# Patient Record
Sex: MALE | Race: WHITE | Employment: OTHER | URBAN - METROPOLITAN AREA
[De-identification: names, ages, dates, MRNs, and addresses within clinical notes are randomized per-mention and may not be internally consistent; named-entity substitution may affect disease eponyms.]

---

## 2018-04-13 ENCOUNTER — HOSPITAL ENCOUNTER (OUTPATIENT)
Dept: PHYSICAL THERAPY | Age: 73
Discharge: HOME | End: 2018-04-13
Payer: MEDICARE

## 2018-04-13 PROCEDURE — G8985 CARRY GOAL STATUS: HCPCS

## 2018-04-13 PROCEDURE — 97161 PT EVAL LOW COMPLEX 20 MIN: CPT

## 2018-04-13 PROCEDURE — G8984 CARRY CURRENT STATUS: HCPCS

## 2018-04-13 PROCEDURE — 97110 THERAPEUTIC EXERCISES: CPT

## 2018-04-13 PROCEDURE — G8986 CARRY D/C STATUS: HCPCS

## 2018-04-13 NOTE — PROGRESS NOTES
PT DAILY TREATMENT NOTE/SHOULDER EVAL 3-16    Patient Name: Felipe Mansfield. Date:2018  : 1945  [x]  Patient  Verified  Payor: Marqueznat Simeon / Plan: [place] MEDICARE PART A & B / Product Type: Medicare /    In time: 11:09am  Out time:11:42am  Total Treatment Time (min): 33  Total Timed Codes (min): 14  1:1 Treatment Time ( only): 33   Visit #: 1 of 1    Treatment Area: Pain in right shoulder [M25.511]    SUBJECTIVE  Pain Level (0-10 scale): 5  []constant [x]intermittent []improving []worsening []no change since onset    Any medication changes, allergies to medications, adverse drug reactions, diagnosis change, or new procedure performed?: [x] No    [] Yes (see summary sheet for update)  Subjective functional status/changes:    Pt reports onset of R shoulder pain 2 weeks ago when playing with his grandchilddeedee's dog and \"blocked\" the dog with his shoulder when the dog charged at him. He reports he felt a pop in his shoulder with immediate onset of shoulder pain and reports he has been unable to lift his shoulder since. He reports urgent care and PCP f/u to date with x-ray imaging that was reportedly unremarkable and is scheduled for orthopaedic f/u in 2 weeks. He also reports onset of R sided neck pain upon waking this morning. PLOF: unlimited with daily activity void of shoulder pain.   Limitations to PLOF: unable to lift his arm overhead 2/2 shoulder pain  Mechanism of Injury: see above  Current symptoms/Complaints: R shoulder and arm aching pain, sharp with elevation attempts  Previous Treatment/Compliance: PCP and urgent care, unremarkable radiographic imaging per pt report  PMHx/Surgical Hx: unremarkable per pt report  Work Hx: retired,  part time  Living Situation:   Pt Goals: see intake  Barriers: []pain []financial []time []transportation []other  Motivation: appears well motivated  Substance use: []Alcohol []Tobacco []other:   FABQ Score: []low []elevate  Cognition: A & O x 4 Other: OBJECTIVE/EXAMINATION  Domestic Life:   Activity/Recreational Limitations:   Mobility:   Self Care:     19 min [x]Eval                  []Re-Eval     8 min Therapeutic Exercise:  [] See flow sheet : HEP creation and instruction per handout   Rationale: increase ROM to improve the patients ability to maintain shoulder ROM to reduce risk of contracture. Self Care: 6 minutes pt education regarding relevant anatomy, diagnosis, prognosis, plan of care, orthopaedic referral and instructions regarding potential f/u to facilitate pt self management and improved outcomes.           With   [] TE   [] TA   [] neuro   [] other: Patient Education: [x] Review HEP    [] Progressed/Changed HEP based on:   [] positioning   [] body mechanics   [] transfers   [] heat/ice application    [] other:      Other Objective/Functional Measures:    Physical Therapy Evaluation - Shoulder    Posture: [] Poor    [x] Fair    [] Good    Describe:    ROM:  [] Unable to assess at this time                                           AROM                                                              PROM   Left Right  Left Right   Flexion 145 54 pain Flexion  WNL pain at end range   Extension   Extension     Scaption/ 30 pain Scaptin/ABD  WNL pain at end range   ER @ 0 Degrees   ER @ 0 Degrees     ER @ 90 Degrees   ER @ 90 Degrees  WNL   IR @ 90 Degrees   IR @ 90 Degrees  WNL     End Feel / Painful Arc:    Strength:   [] Unable to assess at this time                                                                            L (1-5) R (1-5) Pain   Flexors 4+ 2 [x] Yes   [] No   Abductors 4+ 2 [x] Yes   [] No   External Rotators 5 3 [x] Yes   [] No   Internal Rotators 5 4+ [] Yes   [x] No   Supraspinatus   [] Yes   [] No   Serratus Anterior   [] Yes   [] No   Lower Trapezius   [] Yes   [] No   Elbow Flexion 5 4 [x] Yes   [] No   Elbow Extension 5 5 [] Yes   [x] No       Scapulohumoral Control / Rhythm:  Able to eccentrically lower with good control? Left: [x] Yes   [] No     Right: [] Yes   [x] No    Accessory Motions:    Palpation  [] Min  [x] Mod  [] Severe    Location: lateral biceps with palpable defect  [] Min  [] Mod  [] Severe    Location:  [] Min  [] Mod  [] Severe    Location:    Optional Tests:    Sensation Left Right Reflexes Left Right   Biceps (C5)   Biceps (C5)     Sandoval Radial(C6-7)   Brachioradialis (C6)     Sandoval Ulnar(C8-T1)   Triceps (C7)       Adson's Test  [] Pos   [] Neg Yergason's Test [] Pos   [] Neg  Manda's Test  [] Pos   [] Neg Indio's Sign [] Pos   [] Neg  Neer's Test  [] Pos   [] Neg Clunk Test  [] Pos   [] Neg  Hawkin's Test  [x] Pos   [] Neg AC Joint  [] Pos   [] Neg  Speed's Test  [] Pos   [] Neg unable to perform d/t pain SC Joint  [] Pos   [] Neg  Empty Can  [] Pos   [] Neg unable to perform d/t pain Pectoral Tightness [] Pos   [] Neg  Anterior Apprehension [] Pos   [] Neg   Posterior Apprehension [] Pos   [] Neg  Drop Arm Test: (+) minimal drop after release and unable to hold with any resistance    Other Tests / Comments:     Pain Level (0-10 scale) post treatment: 6    ASSESSMENT/Changes in Function: Per POC. Patient will continue to benefit from skilled PT services to modify and progress therapeutic interventions, address functional mobility deficits, address ROM deficits, address strength deficits, analyze and address soft tissue restrictions and analyze and cue movement patterns to attain remaining goals. [x]  See Plan of Care  []  See progress note/recertification  []  See Discharge Summary         Progress towards goals / Updated goals:  Per POC. PLAN  []  Upgrade activities as tolerated     []  Continue plan of care  []  Update interventions per flow sheet       []  Discharge due to:_  [x]  Other:_ Hold pending ortho f/u d/t clinical indications of RTC tear involvement and possible biceps involvement.      Tsering Calles, PT, DPT, ATC 4/13/2018  11:16 AM

## 2018-04-13 NOTE — PROGRESS NOTES
[facility]  [address]  [place], [address]  [phone] fax    Plan of Care/ Statement of Necessity for Physical Therapy Services    Patient name: Miriam Singh Start of Care: 2018   Referral source: Gordon Ocampo MD : 1945    Medical Diagnosis: Pain in right shoulder [M25.511]   Onset Date: 2 weeks ago   Treatment Diagnosis: R rotator cuff pathology/shoulder pain   Prior Hospitalization: see medical history Provider#: [id number]   Medications: Verified on Patient summary List    Comorbidities: unremarkable per pt report   Prior Level of Function: unlimited with daily activity void of shoulder pain. The Plan of Care and following information is based on the information from the initial evaluation. Assessment/ key information: Pt is a 67year old male presenting with reports of onset of R shoulder pain 2 weeks ago when playing with his grandchilden's dog and \"blocked\" the dog with his shoulder when the dog charged at him. He reports he felt a pop in his shoulder with immediate onset of shoulder pain and reports he has been unable to lift his shoulder since. He reports urgent care and PCP f/u to date with x-ray imaging that was reportedly unremarkable and is scheduled for orthopaedic f/u in 2 weeks. He also reports onset of R sided neck pain upon waking this morning. Signs and symptoms appear consistent with R rotator cuff tear with possible biceps involvement as evidenced by poor active R shoulder elevation with near full passive ROM, weakness with flexion, abd, and ER, minimally (+) drop arm test with severe pain reported, weakness with elbow flexion with questionable palpable defect in the R lateral biceps. At this time, pt has been instructed in HEP to facilitate maintenance of shoulder PROM and has been advised to hold PT pending orthopaedic f/u given indications of possible large RTC tear.  He will call to schedule f/u appts pending orthopaedic f/u if they opt for conservative treatment at that time. Evaluation Complexity History MEDIUM  Complexity : 1-2 comorbidities / personal factors will impact the outcome/ POC ; Examination HIGH Complexity : 4+ Standardized tests and measures addressing body structure, function, activity limitation and / or participation in recreation  ;Presentation LOW Complexity : Stable, uncomplicated  ;Clinical Decision Making MEDIUM Complexity : FOTO score of 26-74  Overall Complexity Rating: LOW   Problem List: pain affecting function, decrease ROM, decrease strength, decrease ADL/ functional abilitiies and decrease activity tolerance   Treatment Plan may include any combination of the following: Therapeutic exercise, Therapeutic activities, Neuromuscular re-education, Physical agent/modality, Manual therapy, Patient education and Self Care training  Patient / Family readiness to learn indicated by: asking questions, trying to perform skills and interest  Persons(s) to be included in education: patient (P)  Barriers to Learning/Limitations: None  Patient Goal (s): Get my golf swing back.   Patient Self Reported Health Status: good  Rehabilitation Potential: fair    Short Term Goals: To be accomplished in NA  Frequency / Duration: Patient to be seen 1 times per week for 2  treatments. Patient/ Caregiver education and instruction: Diagnosis, prognosis, self care, activity modification and exercises   [x]  Plan of care has been reviewed with PTA    G-Codes (GP)  Carry   Current  CK= 40-59%    Goal  CJ= 20-39%    The severity rating is based on clinical judgment and the FOTO score. Certification Period: 4/13/2018 - 5/13/2018  Alphonso Keron, PT, DPT, ATC 4/13/2018 1:48 PM    ________________________________________________________________________    I certify that the above Therapy Services are being furnished while the patient is under my care.  I agree with the treatment plan and certify that this therapy is necessary.     [de-identified] Signature:____________________  Date:____________Time: _________    Please sign and return to [facility]  [address]  [place], [address]  [phone] fax

## 2018-05-24 ENCOUNTER — APPOINTMENT (OUTPATIENT)
Dept: PHYSICAL THERAPY | Age: 73
End: 2018-05-24

## 2018-05-25 ENCOUNTER — HOSPITAL ENCOUNTER (OUTPATIENT)
Dept: PHYSICAL THERAPY | Age: 73
Discharge: HOME | End: 2018-05-25
Payer: MEDICARE

## 2018-05-25 PROCEDURE — 97530 THERAPEUTIC ACTIVITIES: CPT

## 2018-05-25 PROCEDURE — G8985 CARRY GOAL STATUS: HCPCS

## 2018-05-25 PROCEDURE — 97161 PT EVAL LOW COMPLEX 20 MIN: CPT

## 2018-05-25 PROCEDURE — G8984 CARRY CURRENT STATUS: HCPCS

## 2018-05-25 PROCEDURE — 97110 THERAPEUTIC EXERCISES: CPT

## 2018-05-25 NOTE — PROGRESS NOTES
[facility]  [address] [place] [address]  [phone] fax    Plan of Care/ Statement of Necessity for Physical Therapy Services    Patient name: Lai De La Cruz Start of Care: 2018   Referral source: Andrew Landers MD : 1945    Medical Diagnosis: Pain in right shoulder [M25.511]   Onset Date:    Treatment Diagnosis: Right shoulder pain   Prior Hospitalization: see medical history Provider#: [id number]   Medications: Verified on Patient summary List    Comorbidities: HTN, hearing impaired   Prior Level of Function: Ind with all mobility, lifting and pulling heavy objects for job, Right handed     The Plan of Care and following information is based on the information from the initial evaluation. Assessment/ barron information: Mr. Mikey Torres is a 68 y/o M pt with CC of Right shoulder pain. Pt was playing with a dog but then got hit while the dog running straight into his shoulder. Pt reported imaging done including MRI, showing RTC problem and MD is indecisive if he should get a surgery. Pt present with mod decreased flex and abd AROM (90 degrees), WNL with other directions but mdo pain, worst with ER, trigger points with Right Biceps and Triceps, pain mostly locates lat shoulder. Pt demonstrated full PROM strength of right shoulder, able to perform ecc from full abd but max pain at 90 degrees. Besides, pt will also be seen for vertigo. Pt would benefit from skilled PT to address these deficits above to improve the ability to perform ADLs and job duties comfortably and safely.     Evaluation Complexity History MEDIUM  Complexity : 1-2 comorbidities / personal factors will impact the outcome/ POC ; Examination MEDIUM Complexity : 3 Standardized tests and measures addressing body structure, function, activity limitation and / or participation in recreation  ;Presentation LOW Complexity : Stable, uncomplicated  ;Clinical Decision Making MEDIUM Complexity : FOTO score of 26-74  Overall Complexity Rating: LOW   Problem List: pain affecting function, decrease ROM, decrease strength, edema affecting function, impaired gait/ balance, decrease ADL/ functional abilitiies, decrease activity tolerance, decrease flexibility/ joint mobility and decrease transfer abilities   Treatment Plan may include any combination of the following: Therapeutic exercise, Therapeutic activities, Neuromuscular re-education, Physical agent/modality, Gait/balance training, Manual therapy, Patient education, Self Care training, Functional mobility training, Home safety training and Stair training  Patient / Family readiness to learn indicated by: asking questions, trying to perform skills and interest  Persons(s) to be included in education: patient (P)  Barriers to Learning/Limitations: None  Patient Goal (s): back to work  Patient Self Reported Health Status: good  Rehabilitation Potential: good    Short term goals: To be accomplished within 1 week   1. Pt will be independent with HEP to maintain progression. Long term goals: To be accomplished within 6 weeks   1. Pt will improve FOTO score by 4 points to 72/100 to show improvement with functional mobility performance. 2. Pt will have Right shoulder flexion/scap AROM >90 degrees to reach overhead comfortably   3. Pt will have Right shoulder strength at least 4+/5 to be able to perform ADLs comfortably. Frequency / Duration: Patient to be seen 1-2 times per week for 6 weeks. Patient/ CarPatient/ Caregiver education and instruction: Diagnosis, prognosis, self care, activity modification, brace/ splint application and exercises   [x]  Plan of care has been reviewed with PTA    G-Codes (GP)  Carry   Current  CJ= 20-39%    Goal  CJ= 20-39%    The severity rating is based on clinical judgment and the FOTO score.     Certification Period: 5- to 7-    Yolanda Stearns PT 5/25/2018 3:28 PM    ________________________________________________________________________    I certify that the above Therapy Services are being furnished while the patient is under my care. I agree with the treatment plan and certify that this therapy is necessary.     Physician's Signature:____________________  Date:____________Time: _________    Please sign and return to [facility]  [address] [place] [address]  [phone] fax

## 2018-05-25 NOTE — PROGRESS NOTES
PT DAILY TREATMENT NOTE/SHOULDER EVAL 3-16    Patient Name: Tisha Meyers. Date:2018  : 1945  [x]  Patient  Verified  Payor: Jorge A De / Plan: [place] MEDICARE PART A & B / Product Type: Medicare /    In time: 2:38  Out time: 3:23  Total Treatment Time (min): 45  Total Timed Codes (min): 23  1:1 Treatment Time ( only): 45   Visit #: 1 of     Treatment Area: Pain in right shoulder [M25.511]    SUBJECTIVE  Pain Level (0-10 scale): 2-310  []constant []intermittent []improving []worsening []no change since onset    Any medication changes, allergies to medications, adverse drug reactions, diagnosis change, or new procedure performed?: [x] No    [] Yes (see summary sheet for update)  Subjective functional status/changes:     PLOF: Ind with all mobility, lifting and pulling heavy objects for job, Right handed     Limitations to PLOF:   Mechanism of Injury: 2018, pt was playing with a dog but was hit by the dog; MRI showed RTC sprain/tear?   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, lifting and pulling heavy doors    Living Situation:   Pt Goals: \"back to [employer]	[place]  Barriers: []pain []financial []time []transportation []other  Motivation: yes  Substance use: []Alcohol []Tobacco []other:   FABQ Score: []low []elevate  Cognition: A & O x 4    Other:    OBJECTIVE/EXAMINATION  Domestic Life:   Activity/Recreational Limitations:   Mobility:   Self Care:       22 min [x]Eval                  []Re-Eval       10 min Therapeutic Exercise:  [] See flow sheet :HEP   Rationale: increase ROM and increase strength to improve the patients ability to perform ADLs with ease    13 min Therapeutic Activity:  []  See flow sheet :Pt edu within scope of practice on prognosis, POC, shoulder and RTC anatomy, modalities use, positioning, activities and job duty modification, active rest, vestibular PT. Rationale: increase ROM, increase strength, improve coordination, improve balance and increase proprioception  to improve the patients ability to perform ADLs with ease and safety       With   [] TE   [] TA   [] neuro   [] other: Patient Education: [x] Review HEP    [] Progressed/Changed HEP based on:   [] positioning   [] body mechanics   [] transfers   [] heat/ice application    [] other:      Other Objective/Functional Measures:     Physical Therapy Evaluation - Shoulder    Posture: [] Poor    [] Fair    [] Good    Describe:    ROM:  [] Unable to assess at this time                                           AROM                                                              PROM   Left Right  Left Right   Flexion and abd 90 WNL Flexion WNL    Extension  WNL Extension     Scaption/ABD   Scaptin/ABD     ER @ 0 Degrees  WNL ER @ 0 Degrees     ER @ 90 Degrees WNL pain at end range WNL ER @ 90 Degrees     IR @ 90 Degrees WNL WNL IR @ 90 Degrees       End Feel / Painful Arc: 90    Strength:   [] Unable to assess at this time                                                                            L (1-5) R (1-5) Pain   Flexors 5 pain 3+ [] Yes   [] No   Abductors 5 pain 3 [] Yes   [] No   External Rotators 5 worst pain 3- [] Yes   [] No   Internal Rotators 5 3- [] Yes   [] No   Supraspinatus   [] Yes   [] No   Serratus Anterior   [] Yes   [] No   Lower Trapezius   [] Yes   [] No   Elbow Flexion 5  [] Yes   [] No   Elbow Extension 5  [] Yes   [] No       Scapulohumoral Control / Rhythm:  Able to eccentrically lower with good control?  Left: [x] Yes   [] No     Right: [x] Yes   [] No with max pain at 90 degrees    Accessory Motions:    Palpation  [] Min  [] Mod  [] Severe    Location:  [] Min  [] Mod  [] Severe    Location:  [] Min  [] Mod  [] Severe    Location:    Optional Tests:    Sensation Left Right Reflexes Left Right   Biceps (C5)   Biceps (C5)     Sandoval Radial(C6-7)   Brachioradialis (C6)     Alon Bailey Ulnar(C8-T1)   Triceps (C7)       Adson's Test  [] Pos   [] Neg Yergason's Test [] Pos   [] Neg  Manda's Test  [] Pos   [] Neg Indio's Sign [] Pos   [] Neg  Neer's Test  [] Pos   [] Neg Clunk Test  [] Pos   [] Neg  Hawkin's Test  [] Pos   [] Neg AC Joint  [] Pos   [] Neg  Speed's Test  [] Pos   [] Neg SC Joint  [] Pos   [] Neg  Empty Can  [] Pos   [] Neg Pectoral Tightness [] Pos   [] Neg  Anterior Apprehension [] Pos   [] Neg   Posterior Apprehension [] Pos   [] Neg      Other Tests / Comments:        Pain Level (0-10 scale) post treatment: 3/10    ASSESSMENT/Changes in Function: see POC    Patient will continue to benefit from skilled PT services to modify and progress therapeutic interventions, address functional mobility deficits, address ROM deficits, address strength deficits, analyze and address soft tissue restrictions, analyze and cue movement patterns, analyze and modify body mechanics/ergonomics, assess and modify postural abnormalities, address imbalance/dizziness and instruct in home and community integration to attain remaining goals.      [x]  See Plan of Care  []  See progress note/recertification  []  See Discharge Summary         Progress towards goals / Updated goals:  See POC    PLAN  [x]  Upgrade activities as tolerated     [x]  Continue plan of care  []  Update interventions per flow sheet       []  Discharge due to:_  []  Other:_      Tisha Haas, PT 5/25/2018  2:39 PM

## 2018-05-31 ENCOUNTER — HOSPITAL ENCOUNTER (OUTPATIENT)
Dept: PHYSICAL THERAPY | Age: 73
Discharge: HOME | End: 2018-05-31
Payer: MEDICARE

## 2018-05-31 PROCEDURE — G8981 BODY POS CURRENT STATUS: HCPCS

## 2018-05-31 PROCEDURE — 97161 PT EVAL LOW COMPLEX 20 MIN: CPT

## 2018-05-31 PROCEDURE — G8982 BODY POS GOAL STATUS: HCPCS

## 2018-05-31 PROCEDURE — 97110 THERAPEUTIC EXERCISES: CPT

## 2018-05-31 NOTE — PROGRESS NOTES
[facility]  [address] [place] [address]  [phone] fax    Plan of Care/ Statement of Necessity for Physical Therapy Services    Patient name: Eloy Bernard Start of Care: 2018   Referral source: Kelsi Herrera MD : 1945    Medical Diagnosis: Tinnitus, unspecified ear [H93.19]  Balance problem [R26.89]   Onset Date: 2018    Treatment Diagnosis: Dizziness   Prior Hospitalization: see medical history Provider#: [id number]   Medications: Verified on Patient summary List    Comorbidities: HTN, Hearing impaired   Prior Level of Function: working full time; independent with all daily activities      The Plan of Care and following information is based on the information from the initial evaluation. Assessment/ key information: The patient is a 68 y/o male with c/o intermittent dizziness since April. The patient reports his symptoms have become less frequent in the last week, but have not completely resolved. The patient reports dizziness with head turns, bending over, and turning over in bed. The patient does have a history of Tinnitus due to years in the [employer] as well as working as a . 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 The dizziness does impact his job duties. Upon evaluation, the patient demonstrates balance WFL. He does have some age appropriate challenges with narrow MARIA TERESA. The patient demonstrates oculomotor function WNL, no reproducible nystagmus, and coordination WFL. The patient has a negative [employer] B, but did c/o of \"lightheadedness\" when getting up from left sidelying. The patient is very limited with C/S AROM and reports neck pain that increases with cervical flexion and extension. Patient will benefit from PT to increase C/S AROM, reduce dizziness and return to PLOF with no restrictions.    Evaluation Complexity History MEDIUM  Complexity : 1-2 comorbidities / personal factors will impact the outcome/ POC ; Examination LOW Complexity : 1-2 Standardized tests and measures addressing body structure, function, activity limitation and / or participation in recreation  ;Presentation LOW Complexity : Stable, uncomplicated  ;Clinical Decision Making LOW Complexity : FOTO score of   Overall Complexity Rating: LOW   Problem List: decrease ROM, impaired gait/ balance and decrease activity tolerance   Treatment Plan may include any combination of the following: Therapeutic exercise, Therapeutic activities, Neuromuscular re-education, Physical agent/modality, Gait/balance training, Manual therapy, Patient education, Self Care training, Functional mobility training and Home safety training  Patient / Family readiness to learn indicated by: asking questions, trying to perform skills and interest  Persons(s) to be included in education: patient (P)  Barriers to Learning/Limitations: None  Patient Goal (s): get back to work  Patient Self Reported Health Status: good  Rehabilitation Potential: good    Short Term Goals: To be accomplished in 2 weeks:   STG 1: Patient to be independent and compliant with HEP to improve C/S AROM for ADL's. STG 2: Patient to roll B in bed with no reproducible dizziness. Long Term Goals: To be accomplished in 4 weeks:   LTG 1: Patient to improve FOTO score by 5 points to indicate improved function. LTG 2: Patient to demonstrate C/S AROM WNL without increased pain. LTG 3: Patient to return to perform bending and overhead reaching activities with no reports of dizziness. Frequency / Duration: Patient to be seen 2 times per week for 4 weeks.     Patient/ Caregiver education and instruction: Diagnosis, prognosis, self care, activity modification and exercises   [x]  Plan of care has been reviewed with CORY    G-Codes (GP)  Position   Current  CI= 1-19%   Goal  CI= 1-19%      The severity rating is based on clinical judgment and the FOTO score. Certification Period: 05/31/18 - 06/29/18  Priscila Cespedes, PT 5/31/2018 2:23 PM    ________________________________________________________________________    I certify that the above Therapy Services are being furnished while the patient is under my care. I agree with the treatment plan and certify that this therapy is necessary.     Physician's Signature:____________________  Date:____________Time: _________    Please sign and return to [facility]  [address] [place] [address]  [phone] fax

## 2018-05-31 NOTE — PROGRESS NOTES
PT DAILY TREATMENT NOTE/VESTIBULAR EVAL 3-16    Patient Name: Kathryn Marroquin. Date:2018  : 1945  [x]  Patient  Verified  Payor: Stefanie Leung / Plan: [place] MEDICARE PART A & B / Product Type: Medicare /    In time:1008  Out time:1107  Total Treatment Time (min): 55  Total Timed Codes (min): 25  1:1 Treatment Time ( [address] only): 55   Visit #: 1 of 8    Treatment Area: Tinnitus, unspecified ear [H93.19]  Balance problem [R26.89]    SUBJECTIVE  Pain Level (0-10 scale): 0/10  []constant []intermittent []improving []worsening []no change since onset    Any medication changes, allergies to medications, adverse drug reactions, diagnosis change, or new procedure performed?: [x] No    [] Yes (see summary sheet for update)  Subjective functional status/changes: Pt reports dizziness about . Pt has a hx of Tinnitus from several years ago and  time in the [employer] and as a . Pt reports over the last week the dizziness has \"seems to have ceased\". He states it has been less consistent than previous weeks. It was happening with bending over and turning over in bed and getting out of bed in the morning. Now it occurs less frequently per pt. It is more inconsistent now. Pt reports no vomiting or nausea. 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  Door, hay, top to car)  Limitations to PLOF: Dizziness did impact job duties as far as lifting and/or bending  Mechanism of Injury: no known mechanism of injury  Current symptoms/Complaints: dizziness is intermittent; pt states he has a terrible memory  Previous Treatment/Compliance: no previous treatment  PMHx/Surgical Hx: N/A  Work Hx: works full time; out of work right now due to Right rotator cuff injury  Living Situation: lives with wife on one level; uses ramp to enter home- does have steps to enter home; 15 steps going down from the back of the house to the ground  Pt Goals: \"to get back to work\"  Barriers: []pain []financial []time []transportation []other  Motivation: good  Substance use: []Alcohol []Tobacco []other:   FABQ Score: [x]low []elevate  Cognition: A & O x 3    Other:    OBJECTIVE/EXAMINATION  Domestic Life: lives with wife and three dogs; carpet in home; throw rugs that do not slip  Activity/Recreational Limitations: working full time; (out of work now due to shoulder injury- planning to return)  Mobility: independent ambulation  Self Care: independent        Modality rationale:    Min Type Additional Details    [] Estim:  []Unatt       []IFC  []Premod                        []Other:  []w/ice   []w/heat  Position:  Location:    [] Estim: []Att    []TENS instruct  []NMES                    []Other:  []w/US   []w/ice   []w/heat  Position:  Location:    []  Traction: [] Cervical       []Lumbar                       [] Prone          []Supine                       []Intermittent   []Continuous Lbs:  [] before manual  [] after manual    []  Ultrasound: []Continuous   [] Pulsed                           []1MHz   []3MHz Location:  W/cm2:    []  Iontophoresis with dexamethasone         Location: [] Take home patch   [] In clinic    []  Ice     []  heat  []  Ice massage  []  Laser   []  Anodyne Position:  Location:    []  Laser with stim  []  Other: Position:  Location:    []  Vasopneumatic Device Pressure:       [] lo [] med [] hi   Temperature: [] lo [] med [] hi   [] Skin assessment post-treatment:  []intact []redness- no adverse reaction    []redness - adverse reaction:     30 min []Eval                  []Re-Eval       25 min Therapeutic Exercise:  [] See flow sheet :   Rationale: improve balance and reduce dizziness to improve the patients ability to improve QOL and return to PLOF              With   [x] TE   [] TA   [] neuro   [] other: Patient Education: [x] Review HEP    [] Progressed/Changed HEP based on:   [x] positioning   [] body mechanics   [] transfers   [] heat/ice application    [x] other: review of Remus Vasyl and C/S AROM; issue for HEP; education regarding Vestibular System and cause of symptoms; reviewed with patient importance of compliance with HEP as well as C/S limitations contributing to impairments. Other Objective/Functional Measures: Pt does have neck pain- does not know when it started; it is worse when sitting in [place] in static position; \"pops and hurts\" after being in one position too long. No hx of falls or loss of balance since dizziness began    Limited Right shoulder AROM due to RTC injury;     Physical Therapy Evaluation - Vestibular    Posture:  [] WNL      [] Forward head    [] Protracted shoulders    [] Retracted shoulders  [] Kyphosis:  [] increased   [] decreased   [] Lordosis:   [] increased   [] decreased  Other:    C/S AROM: [] WFL    [x] Limited    Describe: 20 degrees neck extension; 40 degrees neck flexion; increased pain with flexion  15 degrees Right lateral flexion; 10 degrees left lateral flexion; 38 degrees right rotation; 45 degrees left rotation     Strength: [x] WFL    [] Limited    Describe:    Optional Tests:  Sensation:  [] Intact [] Diminished    Describe:    Proprioception: [] Intact [] Diminished    Describe:    Coordination Testing:       Disdiadochokinesia [] WFL    [] Impaired    Describe:       Heel - Sethi  [] WFL    [] Impaired    Describe:       FNF   [] WFL    [] Impaired    Describe: Toe Tap   [x] [facility]    [] Impaired    Describe:    Oculomotor Tests: (Fixation Not Blocked)       Ocular ROM:   [x] WFL    [] Limited    Describe:       Spontaneous Nystag. [] Neg     [] Pos    [] Left    [] Right       Gaze Holding Nystag.  [] Neg     [] Pos    [] Left    [] Right        Smooth Pursuit  [x] Neg     [] Pos    [] Left    [] Right        Saccades   [x] Neg     [] Pos    [] Left    [] Right        VOR - Slow Head Mvmt [x] Neg     [] Pos    [] Left    [] Right        VOR - Fast Head Mvmt [x] Neg     [] Pos    [] Left    [] Right        Head Thrust  [] Neg     [] Pos    [] Left    [] Right        Static Visual Acuity [] Neg     [] Pos    [] Left    [] Right        Dynamic Visual Acuity [] Neg     [] Pos    [] Left    [] Right     Oculomotor Tests: (Fixation Blocked)       Spontaneous Nystag. [] Neg     [] Pos    [] Left    [] Right       Gaze Holding Nystag. [] Neg     [] Pos    [] Left    [] Right        Head-Shaking Nystag. [] Neg     [] Pos    [] Left    [] Right    Other Special Tests:       Vertebral Artery Testing [] Neg     [] Pos    [] Left    [] Right       Hallpike-Morrowville Maneuver [x] Neg     [] Pos    [x] Left    [x] Right ; pt c/o \"lightheadedness\" when sitting up from left S/L       Roll Test   [] Neg     [] Pos    [] Left    [] Right       Vásquez Balance Scale [] Neg     [] Pos    Score:       Dynamic Gait Index [] Neg     [] Pos    Score:       Functional Gait Assess. [] Neg     [] Pos    Score:    Balance Standard Testing (Eyes Open/Eyes Closed - EO/EC)       Tandem  [x] [facility]    [] Pos    Describe: Left foot in front 27 seconds; right foot in front 26 seconds          Stand on Foam  [] WFL    [] Pos    Describe:        Standing on Rail  [] WFL    [] Pos    Describe:        Sharpened Romberg [] WFL    [] Pos    Describe:        Single Leg Stand  [] WFL    [] Pos    Describe: Right 30 seconds; left 7 seconds  Rhomberg EC: 30 seconds    Motion Sensitivity:  [] Neg     [] Pos    Describe:    Computerized Dynamic Posturography:        [] Not Tested    [] WFL    Score: Other Tests: Kayla Zhong produced lightheadedness on left during PT session;   No dizziness reported with Kayla Zhong to the right         Pain Level (0-10 scale) post treatment: 0/10    ASSESSMENT/Changes in Function: See POC    Patient will continue to benefit from skilled PT services to modify and progress therapeutic interventions, address ROM deficits, analyze and address soft tissue restrictions, analyze and cue movement patterns, analyze and modify body mechanics/ergonomics, address imbalance/dizziness and instruct in home and community integration to attain remaining goals.      []  See Plan of Care  []  See progress note/recertification  []  See Discharge Summary         Progress towards goals / Updated goals:  See POC    PLAN  []  Upgrade activities as tolerated     [x]  Continue plan of care  []  Update interventions per flow sheet       []  Discharge due to:_  []  Other:_      Faith Munoz, PT 5/31/2018  10:12 AM

## 2018-06-06 ENCOUNTER — HOSPITAL ENCOUNTER (OUTPATIENT)
Dept: PHYSICAL THERAPY | Age: 73
Discharge: HOME | End: 2018-06-06
Payer: MEDICARE

## 2018-06-06 PROCEDURE — 97016 VASOPNEUMATIC DEVICE THERAPY: CPT

## 2018-06-06 PROCEDURE — 97140 MANUAL THERAPY 1/> REGIONS: CPT

## 2018-06-06 PROCEDURE — 97530 THERAPEUTIC ACTIVITIES: CPT

## 2018-06-06 PROCEDURE — 97110 THERAPEUTIC EXERCISES: CPT

## 2018-06-06 NOTE — PROGRESS NOTES
PT DAILY TREATMENT NOTE - [facility]     Patient Name: Aixa Hodge. Date:2018  : 1945  [x]  Patient  Verified  Payor: VA MEDICARE / Plan: VA MEDICARE PART A & B / Product Type: Medicare /    In time:1045  Out time:1110  Total Treatment Time (min): 25  Total Timed Codes (min): 25  1:1 Treatment Time ( only): 25   Visit #: 2 of 3-8    Treatment Area: Tinnitus, unspecified ear [H93.19]  Balance problem [R26.89]    SUBJECTIVE  Pain Level (0-10 scale): 0/10  Any medication changes, allergies to medications, adverse drug reactions, diagnosis change, or new procedure performed?: [x] No    [] Yes (see summary sheet for update)  Subjective functional status/changes:   [] No changes reported  Pt reports no dizziness. Whatever he had in the past is gone. OBJECTIVE      15 min Therapeutic Exercise:  [] See flow sheet :   Rationale: increase ROM and increase strength to improve the patients ability to ease with ADL's    10 min Manual Therapy:  STM to Bilateral UT and CS paraspinals   Rationale: decrease pain, increase ROM, increase tissue extensibility and decrease trigger points to ease with ADL's          With   [] TE   [] TA   [] neuro   [] other: Patient Education: [x] Review HEP    [] Progressed/Changed HEP based on:   [] positioning   [] body mechanics   [] transfers   [] heat/ice application    [] other:      Other Objective/Functional Measures: Sx may be cervicogenic in  [place]. He has TTP to UT bilaterally and stiffness with increased muscle activity to his upper CS and back. Introduced SNAGS with pt giving good demonstration of continuation for HEP. Before/after CS rotation right=40 deg/ 50 deg,,,, Left rotation=45 deg/55 deg  Pain Level (0-10 scale) post treatment: 0/10    ASSESSMENT/Changes in Function: Good progress with CS ROM.      Patient will continue to benefit from skilled PT services to modify and progress therapeutic interventions, address functional mobility deficits, address ROM deficits, address strength deficits, analyze and address soft tissue restrictions, analyze and cue movement patterns, analyze and modify body mechanics/ergonomics, assess and modify postural abnormalities and address imbalance/dizziness to attain remaining goals. [x]  See Plan of Care  []  See progress note/recertification  []  See Discharge Summary         Progress towards goals / Updated goals:                STG 1: Patient to be independent and compliant with HEP to improve C/S AROM for ADL's. Pt issued HEP to improve CS ROM. 6/6/18                                      STG 2: Patient to roll B in bed with no reproducible dizziness. Long Term Goals: To be accomplished in 4 weeks:                        LTG 1: Patient to improve FOTO score by 5 points to indicate improved function. LTG 2: Patient to demonstrate C/S AROM WNL without increased pain. Progressing and improving with CS ROM.                          LTG 3: Patient to return to perform bending and overhead reaching activities with no reports of dizziness  PLAN  [x]  Upgrade activities as tolerated     [x]  Continue plan of care  []  Update interventions per flow sheet       []  Discharge due to:_  []  Other:_      Dilan Park, PT 6/6/2018  12:36 PM    Future Appointments  Date Time Provider Denisse Swan   6/8/2018 10:00 AM Dilan Park, PT [id number] [facility]   6/8/2018 10:30 AM Celeste Brock, PT DGIUEST [facility]

## 2018-06-06 NOTE — PROGRESS NOTES
PT DAILY TREATMENT NOTE - [facility]     Patient Name: Rl Keys. Date:2018  : 1945  [x]  Patient  Verified  Payor: VA MEDICARE / Plan: VA MEDICARE PART A & B / Product Type: Medicare /    In time:1000  Out time:1043  Total Treatment Time (min): 43  Total Timed Codes (min): 28  1:1 Treatment Time ( only): 28   Visit #: 2 of 10    Treatment Area: Right Shoulder Pain. SUBJECTIVE  Pain Level (0-10 scale): 0/10  Any medication changes, allergies to medications, adverse drug reactions, diagnosis change, or new procedure performed?: [x] No    [] Yes (see summary sheet for update)  Subjective functional status/changes:   [] No changes reported  Pt reports ongoing right bicep pain and GH pain. Pt wants to get back to golf.      OBJECTIVE    Modality rationale: decrease pain to improve the patients ability to ease with ADL's   Min Type Additional Details    [] Estim:  []Unatt       []IFC  []Premod                        []Other:  []w/ice   []w/heat  Position:  Location:    [] Estim: []Att    []TENS instruct  []NMES                    []Other:  []w/US   []w/ice   []w/heat  Position:  Location:    []  Traction: [] Cervical       []Lumbar                       [] Prone          []Supine                       []Intermittent   []Continuous Lbs:  [] before manual  [] after manual    []  Ultrasound: []Continuous   [] Pulsed                           []1MHz   []3MHz W/cm2:  Location:    []  Iontophoresis with dexamethasone         Location: [] Take home patch   [] In clinic    []  Ice     []  heat  []  Ice massage  []  Laser   []  Anodyne Position:  Location:    []  Laser with stim  []  Other:  Position:  Location:   15 []  Vasopneumatic Device Pressure:       [x] lo [] med [] hi   Temperature: [x] lo [] med [] hi   [] Skin assessment post-treatment:  []intact []redness- no adverse reaction    []redness - adverse reaction:       20 min Therapeutic Exercise:  [] See flow sheet :   Rationale: increase ROM and increase strength to improve the patients ability to ease with reaching. 8 min Manual Therapy:  PROM, oscillations, bicep massage. Rationale: decrease pain, increase ROM and decrease trigger points to ease with ADL's          With   [] TE   [] TA   [] neuro   [] other: Patient Education: [x] Review HEP    [] Progressed/Changed HEP based on:   [] positioning   [] body mechanics   [] transfers   [] heat/ice application    [] other:      Other Objective/Functional Measures: Pain at AAROM  deg in supine with cane. Pain at rest positions with right arm at sides. Eccentric lowering from seated shoulder flexion is not tolerable due to pain. Pain Level (0-10 scale) post treatment: 0/10    ASSESSMENT/Changes in Function: Pt is progressing well. He reports pain with most ex's, but game ready eliminated sx at end of session. Pt wants to get back to golf. Patient will continue to benefit from skilled PT services to modify and progress therapeutic interventions, address functional mobility deficits, address ROM deficits, address strength deficits, analyze and address soft tissue restrictions, analyze and cue movement patterns, analyze and modify body mechanics/ergonomics and assess and modify postural abnormalities to attain remaining goals. []  See Plan of Care  []  See progress note/recertification  []  See Discharge Summary         Progress towards goals / Updated goals:               1. Pt will be independent with HEP to maintain progression. Long term goals: To be accomplished within 6 weeks                        1. Pt will improve FOTO score by 4 points to 72/100 to show improvement with functional mobility performance.                         2. Pt will have Right shoulder flexion/scap AROM >90 degrees to reach overhead comfortably                        3. Pt will have Right shoulder strength at least 4+/5 to be able to perform ADLs comfortably.                    PLAN  [x]  Upgrade activities as tolerated     [x]  Continue plan of care  []  Update interventions per flow sheet       []  Discharge due to:_  []  Other:_      Enedelia Thakkar PT 6/6/2018  10:26 AM    Future Appointments  Date Time Provider Denisse Swan   6/6/2018 10:30 AM Enedelia Thakkar, PT [id number] [facility]   6/8/2018 10:00 AM Coreen Grover Hammans, PT [id number]   6/8/2018 10:30 AM Coreen Grover Hammans, PT [id number] [facility]

## 2018-06-08 ENCOUNTER — HOSPITAL ENCOUNTER (OUTPATIENT)
Dept: PHYSICAL THERAPY | Age: 73
Discharge: HOME | End: 2018-06-08
Payer: MEDICARE

## 2018-06-08 PROCEDURE — 97016 VASOPNEUMATIC DEVICE THERAPY: CPT

## 2018-06-08 PROCEDURE — 97140 MANUAL THERAPY 1/> REGIONS: CPT

## 2018-06-08 PROCEDURE — 97110 THERAPEUTIC EXERCISES: CPT

## 2018-06-08 NOTE — PROGRESS NOTES
PT DAILY TREATMENT NOTE - [facility]     Patient Name: Tisha Meyers. Date:2018  : 1945  [x]  Patient  Verified  Payor: [place] MED[place]ARE / Plan: [place] MEDICARE PART A & B / Product Type: Medicare /    In time:1000  Out time:1043  Total Treatment Time (min): 43  Total Timed Codes (min): 28  1:1 Treatment Time ( only): 28   Visit #: 2 of 12    Treatment Area: Pain in right shoulder [M25.511]    SUBJECTIVE  Pain Level (0-10 scale): 0/10  Any medication changes, allergies to medications, adverse drug reactions, diagnosis change, or new procedure performed?: [x] No    [] Yes (see summary sheet for update)  Subjective functional status/changes:   [] No changes reported  Reports he sits on his recliner and works his shoulder.      OBJECTIVE    Modality rationale: decrease pain to improve the patients ability to ease with ADL's   Min Type Additional Details    [] Estim:  []Unatt       []IFC  []Premod                        []Other:  []w/ice   []w/heat  Position:  Location:    [] Estim: []Att    []TENS instruct  []NMES                    []Other:  []w/US   []w/ice   []w/heat  Position:  Location:    []  Traction: [] Cervical       []Lumbar                       [] Prone          []Supine                       []Intermittent   []Continuous Lbs:  [] before manual  [] after manual    []  Ultrasound: []Continuous   [] Pulsed                           []1MHz   []3MHz W/cm2:  Location:    []  Iontophoresis with dexamethasone         Location: [] Take home patch   [] In clinic    []  Ice     []  heat  []  Ice massage  []  Laser   []  Anodyne Position:  Location:    []  Laser with stim  []  Other:  Position:  Location:   15 [x]  Vasopneumatic Device Pressure:       [x] lo [] med [] hi   Temperature: [x] lo [] med [] hi   [] Skin assessment post-treatment:  []intact []redness- no adverse reaction    []redness - adverse reaction:       20 min Therapeutic Exercise:  [] See flow sheet :   Rationale: increase ROM and increase strength to improve the patients ability to ease with ADL's    8 min Manual Therapy:  PROm, jt mobs grades 3, oscillations   Rationale: decrease pain and increase ROM to ease with ADL's. With   [] TE   [] TA   [] neuro   [] other: Patient Education: [x] Review HEP    [] Progressed/Changed HEP based on:   [] positioning   [] body mechanics   [] transfers   [] heat/ice application    [] other:      Other Objective/Functional Measures: ROM htuvedc=796 deg  Pt did not report much pain during e'x today. Palpable crepitis to right shoulder during active ROM at ~ deg. Pain Level (0-10 scale) post treatment: 0/10    ASSESSMENT/Changes in Function: Progressing well. He did not complain much today. Patient will continue to benefit from skilled PT services to modify and progress therapeutic interventions, address functional mobility deficits, address ROM deficits, address strength deficits, analyze and address soft tissue restrictions, analyze and cue movement patterns and analyze and modify body mechanics/ergonomics to attain remaining goals. [x]  See Plan of Care  []  See progress note/recertification  []  See Discharge Summary         Progress towards goals / Updated goals:     1. Pt will be independent with HEP to maintain progression. Long term goals: To be accomplished within 6 weeks                        1.  Pt will improve FOTO score by 4 points to 72/100 to show improvement with functional mobility performance.                        2. Pt will have Right shoulder flexion/scap AROM >90 degrees to reach overhead comfortably                        3. Pt will have Right shoulder strength at least 4+/5 to be able to perform ADLs comfortably.                  PLAN  []  Upgrade activities as tolerated     [x]  Continue plan of care  []  Update interventions per flow sheet       []  Discharge due to:_  []  Other:_      Cheyenne Crowder, PT 6/8/2018  9:22 AM    Future Appointments  Date Time Provider Denisse Swan   6/8/2018 10:00 AM Katy Paul, PT [id number] [facility]   6/8/2018 10:30 AM Katy Paul, PT [id number] [facility]

## 2018-06-13 ENCOUNTER — HOSPITAL ENCOUNTER (OUTPATIENT)
Dept: PHYSICAL THERAPY | Age: 73
Discharge: HOME | End: 2018-06-13
Payer: MEDICARE

## 2018-06-13 PROCEDURE — 97140 MANUAL THERAPY 1/> REGIONS: CPT

## 2018-06-13 PROCEDURE — 97016 VASOPNEUMATIC DEVICE THERAPY: CPT

## 2018-06-13 NOTE — PROGRESS NOTES
PT DAILY TREATMENT NOTE - [facility]     Patient Name: Lorena Burgos. Date:2018  : 1945  [x]  Patient  Verified  Payor: Jeremy Daniel / Plan: [place] MEDICARE PART A & B / Product Type: Medicare /    In time:9:31  Out time:10:15  Total Treatment Time (min): 44  Total Timed Codes (min): 29  1:1 Treatment Time ([facility] only): 14   Visit #: 4 of 10    Treatment Area: Pain in right shoulder [M25.511]    SUBJECTIVE  Pain Level (0-10 scale): 0  Any medication changes, allergies to medications, adverse drug reactions, diagnosis change, or new procedure performed?: [x] No    [] Yes (see summary sheet for update)  Subjective functional status/changes:   [] No changes reported  No changes reported today.      OBJECTIVE    Modality rationale: decrease inflammation and decrease pain to improve the patients ability to tolerate ADLs   Min Type Additional Details    [] Estim:  []Unatt       []IFC  []Premod                        []Other:  []w/ice   []w/heat  Position:  Location:    [] Estim: []Att    []TENS instruct  []NMES                    []Other:  []w/US   []w/ice   []w/heat  Position:  Location:    []  Traction: [] Cervical       []Lumbar                       [] Prone          []Supine                       []Intermittent   []Continuous Lbs:  [] before manual  [] after manual    []  Ultrasound: []Continuous   [] Pulsed                           []1MHz   []3MHz W/cm2:  Location:    []  Iontophoresis with dexamethasone         Location: [] Take home patch   [] In clinic    []  Ice     []  heat  []  Ice massage  []  Laser   []  Anodyne Position:  Location:    []  Laser with stim  []  Other:  Position:  Location:   15 [x]  Vasopneumatic Device Pressure:       [x] lo [] med [] hi   Temperature: [x] lo [] med [] hi   [] Skin assessment post-treatment:  []intact []redness- no adverse reaction    []redness - adverse reaction:     19 min Therapeutic Exercise:  [x] See flow sheet :   Rationale: increase ROM and increase strength to improve the patients ability to tolerate ADLs    10 min Manual Therapy: right inferior and posterior GHJ mobs grade 2-3, right GHJ long axis distraction, PROM into right shoulder flex and ER with gentle over pressure. Rationale: decrease pain, increase ROM and increase tissue extensibility to improve activity tolerance          With   [] TE   [] TA   [] neuro   [] other: Patient Education: [x] Review HEP    [] Progressed/Changed HEP based on:   [] positioning   [] body mechanics   [] transfers   [] heat/ice application    [] other:      Other Objective/Functional Measures: Unable to perform s/l right shoulder ER through full range secondary to pain. Pain Level (0-10 scale) post treatment: 0    ASSESSMENT/Changes in Function: Reported no pain post session. Educated pt to perform exercises and HEP exercises to tolerance and to not push into pain. Reported pain in the right shoulder with end range right shoulder flex PROM. Continue POC as tolerated. Patient will continue to benefit from skilled PT services to modify and progress therapeutic interventions, address functional mobility deficits, address ROM deficits, address strength deficits, analyze and address soft tissue restrictions, analyze and cue movement patterns, analyze and modify body mechanics/ergonomics, assess and modify postural abnormalities and instruct in home and community integration to attain remaining goals. []  See Plan of Care  []  See progress note/recertification  []  See Discharge Summary         Progress towards goals / Updated goals:   1. Pt will be independent with HEP to maintain progression. Long term goals: To be accomplished within 6 weeks                        1. Pt will improve FOTO score by 4 points to 72/100 to show improvement with functional mobility performance.                        2. Pt will have Right shoulder flexion/scap AROM >90 degrees to reach overhead comfortably                        3. Pt will have Right shoulder strength at least 4+/5 to be able to perform ADLs comfortably.              PLAN  [x]  Upgrade activities as tolerated     [x]  Continue plan of care  [x]  Update interventions per flow sheet       []  Discharge due to:_  []  Other:_      Ramona Diaz, PT 6/13/2018  9:47 AM    Future Appointments  Date Time Provider Denisse Swan   6/13/2018 9:30 AM Ramona Diaz PT [id number] [facility]   6/15/2018 10:30 AM Roman Nova PT [id number] [facility]

## 2018-06-15 ENCOUNTER — HOSPITAL ENCOUNTER (OUTPATIENT)
Dept: PHYSICAL THERAPY | Age: 73
Discharge: HOME | End: 2018-06-15
Payer: MEDICARE

## 2018-06-15 PROCEDURE — 97016 VASOPNEUMATIC DEVICE THERAPY: CPT

## 2018-06-15 PROCEDURE — 97110 THERAPEUTIC EXERCISES: CPT

## 2018-06-15 NOTE — PROGRESS NOTES
PT DAILY TREATMENT NOTE - [facility]     Patient Name: Shila Martinez. Date:6/15/2018  : 1945  [x]  Patient  Verified  Payor: Ramona Flynn / Plan: [place] MEDICARE PART A & B / Product Type: Medicare /    In time:10:33  Out time:11:21  Total Treatment Time (min): 48  Total Timed Codes (min): 33  1:1 Treatment Time ( only): 23   Visit #: 5 of 10    Treatment Area: Pain in right shoulder [M25.511]    SUBJECTIVE  Pain Level (0-10 scale): 0/10  Any medication changes, allergies to medications, adverse drug reactions, diagnosis change, or new procedure performed?: [x] No    [] Yes (see summary sheet for update)  Subjective functional status/changes:   [] No changes reported  Pt reports sharp pain with reaching arm out in front above shoulder height, arm out to the side above shoulder height, or with trying to reach over left shoulder. Lifting is still painful in the right arm. Pt says he is considering switching doctors, and is considering surgery so that he can get back to his normal activities without pain. OBJECTIVE    Modality rationale: decrease inflammation, decrease pain and increase muscle contraction/control to improve the patients ability to perform ADL's.     Min Type Additional Details    [] Estim:  []Unatt       []IFC  []Premod                        []Other:  []w/ice   []w/heat  Position:  Location:    [] Estim: []Att    []TENS instruct  []NMES                    []Other:  []w/US   []w/ice   []w/heat  Position:  Location:    []  Traction: [] Cervical       []Lumbar                       [] Prone          []Supine                       []Intermittent   []Continuous Lbs:  [] before manual  [] after manual    []  Ultrasound: []Continuous   [] Pulsed                           []1MHz   []3MHz W/cm2:  Location:    []  Iontophoresis with dexamethasone         Location: [] Take home patch   [] In clinic    []  Ice     []  heat  []  Ice massage  []  Laser   []  Anodyne Position:  Location:    []  Laser with stim  []  Other:  Position:  Location:   15 [x]  Vasopneumatic Device Pressure:       [x] lo [] med [] hi   Temperature: [x] lo [] med [] hi   [x] Skin assessment post-treatment:  [x]intact [x]redness- no adverse reaction    []redness - adverse reaction:     33 min Therapeutic Exercise:  [x] See flow sheet :   Rationale: increase ROM and increase strength to improve the patients ability to perform ADL's. With   [x] TE   [] TA   [] neuro   [] other: Patient Education: [x] Review HEP    [] Progressed/Changed HEP based on:   [] positioning   [] body mechanics   [] transfers   [x] heat/ice application    [] other:      Other Objective/Functional Measures:   144 degrees Right shoulder flexion AROM in supine, pain from about 45-90 degrees only. 125 degrees Right shoulder abduction AROM in supine, pain at end range. Pt liked doing the pulleys today and felt like he was getting more range with less pain. Pt had slight increased pain with wall wipes at the end. Pt unable to tolerate ER with 0.5# on [place], but could do sidelying ER without weight in a limited range. Pt was educated on getting a pair of pulleys at home and to continue icing as needed throughout the day. Pt was also educated on how to do AAROM at home to improve shoulder mobility. Pain Level (0-10 scale) post treatment: 0/10    ASSESSMENT/Changes in Function: Pt is making steady progress toward goals. He continues to have moderate pain with active shoulder flexion, abduction and ER against gravity. Pt's shoulder flexion and abduction AROM has improved but is still painful. Cont to address strength deficits in these shoulder motions.      Patient will continue to benefit from skilled PT services to modify and progress therapeutic interventions, address functional mobility deficits, address ROM deficits, address strength deficits, analyze and address soft tissue restrictions, analyze and cue movement patterns, analyze and modify body mechanics/ergonomics and assess and modify postural abnormalities to attain remaining goals. Progress towards goals / Updated goals:   1. Pt will be independent with HEP to maintain progression. Progressing. Pt reports intermittent compliance (06/15/2018)  Long term goals: To be accomplished within 6 weeks   1. Pt will improve FOTO score by 4 points to 72/100 to show improvement with functional mobility performance.   2. Pt will have Right shoulder flexion/scap AROM >90 degrees to reach overhead comfortably. Met (06/15/2018) 144 degrees flexion, 125 degrees abduction.    3. Pt will have Right shoulder strength at least 4+/5 to be able to perform ADLs comfortably.           PLAN  [x]  Upgrade activities as tolerated     [x]  Continue plan of care  [x]  Update interventions per flow sheet       []  Discharge due to:_  []  Other:_      Ada Picking 6/15/2018  10:33 AM    No future appointments.

## 2018-06-21 ENCOUNTER — HOSPITAL ENCOUNTER (OUTPATIENT)
Dept: PHYSICAL THERAPY | Age: 73
Discharge: HOME | End: 2018-06-21
Payer: MEDICARE

## 2018-06-21 PROCEDURE — 97110 THERAPEUTIC EXERCISES: CPT

## 2018-06-21 PROCEDURE — 97140 MANUAL THERAPY 1/> REGIONS: CPT

## 2018-06-21 PROCEDURE — 97016 VASOPNEUMATIC DEVICE THERAPY: CPT

## 2018-06-21 NOTE — PROGRESS NOTES
PT DAILY TREATMENT NOTE - [facility]     Patient Name: Jhonny Dexter. Date:2018  : 1945  [x]  Patient  Verified  Payor: Aaron Pore / Plan: [place] MEDICARE PART A & B / Product Type: Medicare /    In time:1136  Out time:1223  Total Treatment Time (min): 47  Total Timed Codes (min): 32  1:1 Treatment Time ( only): 32   Visit #: 4 of 10    Treatment Area: Pain in right shoulder [M25.511]    SUBJECTIVE  Pain Level (0-10 scale): 7/10  Any medication changes, allergies to medications, adverse drug reactions, diagnosis change, or new procedure performed?: [x] No    [] Yes (see summary sheet for update)  Subjective functional status/changes:   [] No changes reported  Pt reports he is not happy with his current MD and wants has another appt with a different doc next month. He can't remember his name. 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     OBJECTIVE    Modality rationale: decrease pain to improve the patients ability to ease with ADL's   Min Type Additional Details    [] Estim:  []Unatt       []IFC  []Premod                        []Other:  []w/ice   []w/heat  Position:  Location:    [] Estim: []Att    []TENS instruct  []NMES                    []Other:  []w/US   []w/ice   []w/heat  Position:  Location:    []  Traction: [] Cervical       []Lumbar                       [] Prone          []Supine                       []Intermittent   []Continuous Lbs:  [] before manual  [] after manual    []  Ultrasound: []Continuous   [] Pulsed                           []1MHz   []3MHz W/cm2:  Location:    []  Iontophoresis with dexamethasone         Location: [] Take home patch   [] In clinic    []  Ice     []  heat  []  Ice massage  []  Laser   []  Anodyne Position:  Location:    []  Laser with stim  []  Other:  Position:  Location:   15 []  Vasopneumatic Device Pressure:       [x] lo [] med [] hi   Temperature: [x] lo [] med [] hi   [] Skin assessment post-treatment:  []intact []redness- no adverse reaction        22 min Therapeutic Exercise:  [] See flow sheet :   Rationale: increase ROM and increase strength to improve the patients ability to ease withADL's    10 min Manual Therapy:  PROM, Jt mobs grades 3   Rationale: decrease pain, increase ROM and decrease trigger points to ease with ADL's          With   [] TE   [] TA   [] neuro   [] other: Patient Education: [x] Review HEP    [] Progressed/Changed HEP based on:   [] positioning   [] body mechanics   [] transfers   [] heat/ice application    [] other:      Other Objective/Functional Measures: AROM=163 deg. Scaption= 130 deg in supine after manual.  MMT right shoulder= 4/5   Working in pain free ROM. Pain Level (0-10 scale) post treatment: 4/10    ASSESSMENT/Changes in Function: Progressing slowly. Possible rotator cuff involvement due to increased pain with active/passive motion at  deg. Re educated on icing shoulder. Pt working on getting a 2nd opinion. Patient will continue to benefit from skilled PT services to modify and progress therapeutic interventions, address functional mobility deficits, address ROM deficits, address strength deficits, analyze and address soft tissue restrictions, analyze and cue movement patterns, analyze and modify body mechanics/ergonomics and assess and modify postural abnormalities to attain remaining goals. [x]  See Plan of Care  []  See progress note/recertification  []  See Discharge Summary         Progress towards goals / Updated goals:   1. Pt will be independent with HEP to maintain progression. Progressing. Pt reports intermittent compliance (06/15/2018)  Long term goals: To be accomplished within 6 weeks   1. Pt will improve FOTO score by 4 points to 72/100 to show improvement with functional mobility performance. Not met. 6/21/18   2. Pt will have Right shoulder flexion/scap AROM >90 degrees to reach overhead comfortably.  Met (06/15/2018) 144 degrees flexion, 125 degrees abduction.    3. Pt will have Right shoulder strength at least 4+/5 to be able to perform ADLs comfortably.   Not met. 6/21/18    PLAN  []  Upgrade activities as tolerated     [x]  Continue plan of care  []  Update interventions per flow sheet       []  Discharge due to:_  []  Other:_      Katy Paul PT 6/21/2018  11:37 AM    Future Appointments  Date Time Provider Denisse Swan   6/22/2018 12:00 PM Dante Ramirez PT [id number]

## 2018-06-22 ENCOUNTER — HOSPITAL ENCOUNTER (OUTPATIENT)
Dept: PHYSICAL THERAPY | Age: 73
Discharge: HOME | End: 2018-06-22
Payer: MEDICARE

## 2018-06-22 PROCEDURE — 97110 THERAPEUTIC EXERCISES: CPT

## 2018-06-22 PROCEDURE — G8984 CARRY CURRENT STATUS: HCPCS

## 2018-06-22 PROCEDURE — 97016 VASOPNEUMATIC DEVICE THERAPY: CPT

## 2018-06-22 PROCEDURE — G8985 CARRY GOAL STATUS: HCPCS

## 2018-06-22 NOTE — PROGRESS NOTES
[facility] [place] [facility]  [address] [place] [address]  [phone] fax    Medicare Progress Report    Patient name: Bjorn Mckeon Start of Care: 2018   Referral source: Sergei Bower MD : 1945   Medical/Treatment Diagnosis: Pain in right shoulder [M25.511] Onset Date:2018     Prior Hospitalization: see medical history Provider#: [id number]   Medications: Verified on Patient Summary List    Comorbidities: HTN, hearing impaired  Prior Level of Function: Ind with all mobility, lifting and pulling heavy objects for job, Right-handed  Visits from Start of Care: 7    Missed Visits: 0    Reporting Period: 2018 to 2018    Subjective Reports: Pt reports having no pain at rest, just with moving the right shoulder above shoulder height in front of him, at his side, or reaching across his body. Current Status/ treatment goals Objective measures   1. Pt will be independent with HEP to maintain progression. [] met                 [] not met  [x] progressing  Pt reports intermittent compliance (18)   2. Pt will improve FOTO score by 4 points to 72/100 to show improvement with functional mobility performance. [] met                 [x] not met  [] progressing Decrease of 6 points (18)   3. Pt will have Right shoulder flexion/scap AROM >90 degrees to reach overhead comfortably. [x] met                 [] not met  [] progressing 144 degrees flexion, 125 degrees abduction in supine (6/15/18)   4. Pt will have Right shoulder strength at least 4+/5 to be able to perform ADLs comfortably. [] met                 [] not met  [x] progressing 4/5 (18)     Key functional changes: increased right shoulder flexion in supine, increased right shoulder strength    Problems/ barriers to goal attainment: pain, intermittent compliance with HEP     Assessment / Recommendations: Pt is making fair progress toward goals in therapy.  Pt continues to struggle with severe pain (7/10) during right shoulder flexion and abduction above 90 degrees and with external rotation against gravity. Pt has good shoulder flexion and abduction AROM in supine, however. Pt continues to improve shoulder strength, but is still limited d/t pain. Pt reports intermittent compliance with HEP. Skilled PT is medically necessary to continue addressing shoulder mobility and strength to improve quality of life and return to full work duties. Problem List: pain affecting function, decrease ROM, decrease strength, decrease ADL/ functional abilitiies, decrease activity tolerance and decrease flexibility/ joint mobility   Treatment Plan: Therapeutic exercise, Therapeutic activities, Physical agent/modality, Manual therapy, Patient education and Functional mobility training    Updated Goals to be accomplished in 3 treatments:  1. Pt will be independent with HEP to maintain progression. 2. Pt will improve FOTO score by 4 points to 72/100 to show improvement with functional mobility performance. 3. Pt will have Right shoulder flexion/scap AROM >90 degrees in standing to reach overhead comfortably. 4. Pt will have Right shoulder strength at least 4+/5 to be able to perform ADLs comfortably. Frequency / Duration: Patient to be seen 2 times per week for 3 visits:    G-Codes (GP)  Carry   Current  CJ= 20-39%    Goal  CJ= 20-39%      The severity rating is based on clinical judgment and the FOTO score.       Jerrica Durham 6/22/2018 12:37 PM

## 2018-06-22 NOTE — PROGRESS NOTES
PT DAILY TREATMENT NOTE - [facility]     Patient Name: Felipe Mansfield. Date:2018  : 1945  [x]  Patient  Verified  Payor: Marqueznat Simeon / Plan: [place] MED[place]ARE PART A & B / Product Type: Medicare /    In time:11:59  Out time:12:45  Total Treatment Time (min): 46  Total Timed Codes (min): 46  1:1 Treatment Time ( [address] only): 31   Visit #: 7 of 10    Treatment Area: Pain in right shoulder [M25.511]    SUBJECTIVE  Pain Level (0-10 scale): 0/10, 7/10 with movement  Any medication changes, allergies to medications, adverse drug reactions, diagnosis change, or new procedure performed?: [x] No    [] Yes (see summary sheet for update)  Subjective functional status/changes:   [] No changes reported  Pt reports intermittent compliance with HEP. Pain only with reaching across body, reaching overhead, or reaching out to the side. OBJECTIVE    Modality rationale: decrease inflammation and decrease pain to improve the patients ability to reach overhead.     Min Type Additional Details    [] Estim:  []Unatt       []IFC  []Premod                        []Other:  []w/ice   []w/heat  Position:  Location:    [] Estim: []Att    []TENS instruct  []NMES                    []Other:  []w/US   []w/ice   []w/heat  Position:  Location:    []  Traction: [] Cervical       []Lumbar                       [] Prone          []Supine                       []Intermittent   []Continuous Lbs:  [] before manual  [] after manual    []  Ultrasound: []Continuous   [] Pulsed                           []1MHz   []3MHz W/cm2:  Location:    []  Iontophoresis with dexamethasone         Location: [] Take home patch   [] In clinic    []  Ice     []  heat  []  Ice massage  []  Laser   []  Anodyne Position:  Location:    []  Laser with stim  []  Other:  Position:  Location:   15 [x]  Vasopneumatic Device Pressure:       [x] lo [] med [] hi   Temperature: [x] lo [] med [] hi   [] Skin assessment post-treatment:  []intact []redness- no adverse reaction []redness - adverse reaction:     31 min Therapeutic Exercise:  [x] See flow sheet :   Rationale: increase ROM and increase strength to improve the patients ability to perform ADL's. With   [x] TE   [] TA   [] neuro   [] other: Patient Education: [x] Review HEP    [] Progressed/Changed HEP based on:   [] positioning   [] body mechanics   [] transfers   [] heat/ice application    [x] other: getting a set of pulleys at home     Other Objective/Functional Measures:   Pt reports no difficulty with pulleys in flexion, but abduction caused pain. Will do flexion and scaption next time. Pt was challenged with ball stability going up/down. Pt has limited ER ROM in sidelying due to pain.       Pain Level (0-10 scale) post treatment: 0/10    ASSESSMENT/Changes in Function:   See POC     Progress towards goals / Updated goals:  See POC    PLAN  []  Upgrade activities as tolerated     [x]  Continue plan of care  []  Update interventions per flow sheet       []  Discharge due to:_  []  Other:_      Latia Florez 6/22/2018  11:42 AM    Future Appointments  Date Time Provider Denisse Swan   6/22/2018 12:00 PM Kiki Das, PT [facility]

## 2018-07-06 ENCOUNTER — HOSPITAL ENCOUNTER (OUTPATIENT)
Dept: PHYSICAL THERAPY | Age: 73
Discharge: HOME | End: 2018-07-06
Payer: MEDICARE

## 2018-07-06 PROCEDURE — 97110 THERAPEUTIC EXERCISES: CPT

## 2018-07-06 NOTE — PROGRESS NOTES
PT DAILY TREATMENT NOTE - [facility]     Patient Name: Zane Thomas Date:2018  : 1945  [x]  Patient  Verified  Payor: [place] MEDICARE / Plan: [place] MEDICARE PART A & B / Product Type: Medicare /    In time: 4:00  Out time: 4:38  Total Treatment Time (min): 38  Total Timed Codes (min): 28  1:1 Treatment Time ( only): 28   Visit #: 8 of 10    Treatment Area: Pain in right shoulder [M25.511]    SUBJECTIVE  Pain Level (0-10 scale): 0/10  Any medication changes, allergies to medications, adverse drug reactions, diagnosis change, or new procedure performed?: [x] No    [] Yes (see summary sheet for update)  Subjective functional status/changes:   [] No changes reported  Pt. Reports he is feeling about the same. He reports he made an appointment with another physician at the end of the month for a second opinion.      OBJECTIVE    Modality rationale: decrease pain to improve the patients ability to increase ease of ADLs   Min Type Additional Details    [] Estim:  []Unatt       []IFC  []Premod                        []Other:  []w/ice   []w/heat  Position:  Location:    [] Estim: []Att    []TENS instruct  []NMES                    []Other:  []w/US   []w/ice   []w/heat  Position:  Location:    []  Traction: [] Cervical       []Lumbar                       [] Prone          []Supine                       []Intermittent   []Continuous Lbs:  [] before manual  [] after manual    []  Ultrasound: []Continuous   [] Pulsed                           []1MHz   []3MHz W/cm2:  Location:    []  Iontophoresis with dexamethasone         Location: [] Take home patch   [] In clinic   10 [x]  Ice     []  heat  []  Ice massage  []  Laser   []  Anodyne Position: seated  Location: right shoulder    []  Laser with stim  []  Other:  Position:  Location:    []  Vasopneumatic Device Pressure:       [] lo [] med [] hi   Temperature: [] lo [] med [] hi   [x] Skin assessment post-treatment:  [x]intact []redness- no adverse reaction []redness - adverse reaction:     28 min Therapeutic Exercise:  [x] See flow sheet :   Rationale: increase ROM and increase strength to improve the patients ability to increase ease of ADLs          With   [x] TE   [] TA   [] neuro   [] other: Patient Education: [x] Review HEP    [] Progressed/Changed HEP based on:   [] positioning   [] body mechanics   [] transfers   [] heat/ice application    [] other:      Other Objective/Functional Measures:   Pt. Had pain with side lying ER as he performed more reps  He continues to have pain with reaching across body and at end range elevation  He had no pain with rows and extensions     Pain Level (0-10 scale) post treatment: 0/10    ASSESSMENT/Changes in Function:  Pt. Is making limited progress towards goals. He continues to have decreased right shoulder mobility and strength. He has most pain with abduction and ER. Patient will continue to benefit from skilled PT services to modify and progress therapeutic interventions, address functional mobility deficits, address ROM deficits, address strength deficits, analyze and address soft tissue restrictions, analyze and cue movement patterns and analyze and modify body mechanics/ergonomics to attain remaining goals. Progress towards goals / Updated goals:  1. Pt will be independent with HEP to maintain progression. 2. Pt will improve FOTO score by 4 points to 72/100 to show improvement with functional mobility performance. 3. Pt will have Right shoulder flexion/scap AROM >90 degrees in standing to reach overhead comfortably. 4. Pt will have Right shoulder strength at least 4+/5 to be able to perform ADLs comfortably.     PLAN  []  Upgrade activities as tolerated     [x]  Continue plan of care  []  Update interventions per flow sheet       []  Discharge due to:_  []  Other:_      Kath Rodriguez, PT 7/6/2018  4:04 PM    Future Appointments  Date Time Provider Denisse Swan   7/9/2018 2:00 PM Marty Shaffer Jere Palencia [facility]   7/13/2018 8:00 AM [facility]

## 2018-07-09 ENCOUNTER — HOSPITAL ENCOUNTER (OUTPATIENT)
Dept: PHYSICAL THERAPY | Age: 73
Discharge: HOME | End: 2018-07-09
Payer: MEDICARE

## 2018-07-09 PROCEDURE — 97110 THERAPEUTIC EXERCISES: CPT

## 2018-07-09 NOTE — PROGRESS NOTES
PT DAILY TREATMENT NOTE - [facility]     Patient Name: Yin Sotelo. Date:2018  : 1945  [x]  Patient  Verified  Payor: VA MEDICARE / Plan: VA MEDICARE PART A & B / Product Type: Medicare /    In time: 2:00  Out time: 2:43  Total Treatment Time (min): 43  Total Timed Codes (min): 33  1:1 Treatment Time ( [address] only): 33   Visit #: 9 of 10    Treatment Area: Pain in right shoulder [M25.511]    SUBJECTIVE  Pain Level (0-10 scale): 8/10  Any medication changes, allergies to medications, adverse drug reactions, diagnosis change, or new procedure performed?: [x] No    [] Yes (see summary sheet for update)  Subjective functional status/changes:   [] No changes reported  Pt. Reports he had a hard time sleeping last night and is having more pain today.      OBJECTIVE    Modality rationale: decrease inflammation and decrease pain to improve the patients ability to increase ease of ADLs   Min Type Additional Details    [] Estim:  []Unatt       []IFC  []Premod                        []Other:  []w/ice   []w/heat  Position:  Location:    [] Estim: []Att    []TENS instruct  []NMES                    []Other:  []w/US   []w/ice   []w/heat  Position:  Location:    []  Traction: [] Cervical       []Lumbar                       [] Prone          []Supine                       []Intermittent   []Continuous Lbs:  [] before manual  [] after manual    []  Ultrasound: []Continuous   [] Pulsed                           []1MHz   []3MHz W/cm2:  Location:    []  Iontophoresis with dexamethasone         Location: [] Take home patch   [] In clinic   10 [x]  Ice     []  heat  []  Ice massage  []  Laser   []  Anodyne Position: seated  Location: right shoulder    []  Laser with stim  []  Other:  Position:  Location:    []  Vasopneumatic Device Pressure:       [] lo [] med [] hi   Temperature: [] lo [] med [] hi   [x] Skin assessment post-treatment:  [x]intact []redness- no adverse reaction    []redness - adverse reaction:     33 min Therapeutic Exercise:  [x] See flow sheet :   Rationale: increase ROM and increase strength to improve the patients ability to increase ease of ADLs          With   [x] TE   [] TA   [] neuro   [] other: Patient Education: [x] Review HEP    [] Progressed/Changed HEP based on:   [] positioning   [] body mechanics   [] transfers   [] heat/ice application    [] other:      Other Objective/Functional Measures:   Right shoulder AROM flex: 117 scap: 115 degrees with pain at end range    Pt. Has good range for flexion during pulleys  Unable to perform 1 UE wall wipes into scaption secondary to pain    Pain Level (0-10 scale) post treatment: 0/10    ASSESSMENT/Changes in Function: pt. Is making limited progress towards goals. He continues to have decreased right shoulder mobility and decreased strength. He continues to have significant pain with reaching and bringing arm across body. Patient will continue to benefit from skilled PT services to modify and progress therapeutic interventions, address functional mobility deficits, address ROM deficits, address strength deficits, analyze and address soft tissue restrictions, analyze and cue movement patterns, analyze and modify body mechanics/ergonomics and assess and modify postural abnormalities to attain remaining goals. Progress towards goals / Updated goals:  1. Pt will be independent with HEP to maintain progression. 2. Pt will improve FOTO score by 4 points to 72/100 to show improvement with functional mobility performance. 3. Pt will have Right shoulder flexion/scap AROM >90 degrees in standing to reach overhead comfortably. Met: flex: 117 scap: 115 degrees (7/9/18)  4. Pt will have Right shoulder strength at least 4+/5 to be able to perform ADLs comfortably.     PLAN  []  Upgrade activities as tolerated     [x]  Continue plan of care  []  Update interventions per flow sheet       []  Discharge due to:_  []  Other:_      Andra Nunes, PT 7/9/2018  1:47 PM    Future Appointments  Date Time Provider Denisse Sosa   7/9/2018 2:00 PM Jordan Alford, PT [facility]   7/13/2018 8:00 AM AYAKA BrewerKAGUSTINA [facility]

## 2018-07-13 ENCOUNTER — HOSPITAL ENCOUNTER (OUTPATIENT)
Dept: PHYSICAL THERAPY | Age: 73
Discharge: HOME | End: 2018-07-13
Payer: MEDICARE

## 2018-07-13 PROCEDURE — 97110 THERAPEUTIC EXERCISES: CPT

## 2018-07-13 PROCEDURE — G8985 CARRY GOAL STATUS: HCPCS

## 2018-07-13 PROCEDURE — G8984 CARRY CURRENT STATUS: HCPCS

## 2018-07-13 NOTE — PROGRESS NOTES
PT DAILY TREATMENT NOTE - [facility]     Patient Name: Kayleigh Pedraza. Date:2018  : 1945  [x]  Patient  Verified  Payor: Katie Breath / Plan: [place] MEDICARE PART A & B / Product Type: Medicare /    In time: 8:00  Out time: 8:30  Total Treatment Time (min):  30  Total Timed Codes (min):  30  1:1 Treatment Time ( [address] only): 30   Visit #: 10 of 10    Treatment Area: Pain in right shoulder [M25.511]    SUBJECTIVE  Pain Level (0-10 scale): 8/10  Any medication changes, allergies to medications, adverse drug reactions, diagnosis change, or new procedure performed?: [x] No    [] Yes (see summary sheet for update)  Subjective functional status/changes:   [] No changes reported  Pt. Reports he is feeling about the same today. OBJECTIVE    30 min Therapeutic Exercise:  [x] See flow sheet :   Rationale: increase ROM and increase strength to improve the patients ability to increase ease of ADLs          With   [x] TE   [] TA   [] neuro   [] other: Patient Education: [x] Review HEP    [] Progressed/Changed HEP based on:   [] positioning   [] body mechanics   [] transfers   [] heat/ice application    [] other:      Other Objective/Functional Measures:   Right shoulder MMT flex: 4/5 IR: 4+/5 ER:3+/5    Pt. Was challenged with 1# scaptions  Discussed D/C plans with pt. And he is agreeable to D/C at this time and will follow back up with physician to see if surgery is going to be an option   FOTO: 46 points      Pain Level (0-10 scale) post treatment:  7/10    ASSESSMENT/Changes in Function:     See D/C note     Progress towards goals / Updated goals:  See D/C note    PLAN  []  Upgrade activities as tolerated     []  Continue plan of care  []  Update interventions per flow sheet       [x]  Discharge due to:_ lack of progress towards goals. []  Other:_      Sophia Jha, PT 2018  8:03 AM    No future appointments.

## 2018-07-13 NOTE — PROGRESS NOTES
[facility] - Claudette Stringer  [address]  [phone] fax    Physical Therapy Discharge Summary    Patient name: Lanney Brunner Start of Care:  18   Referral source: Nhan Myers MD : 1945   Medical/Treatment Diagnosis: Pain in right shoulder [M25.511] Onset Date: 2018     Prior Hospitalization: see medical history Provider#: [id number]   Medications: Verified on Patient Summary List    Comorbidities:  HTN, hearing impaired  Prior Level of Function: Ind with all mobility, lifting and pulling heavy objects for job, Right-handed    Visits from Start of Care: 10    Missed Visits: 0    Reporting Period :  18 to  18    Summary of Care:  Goal: Pt will improve FOTO score by 4 points to 72/100 to show improvement with functional mobility performance. Status at last note/certification: 68 points  Status at discharge: not met    Goal: Pt will have Right shoulder flexion/scap AROM >90 degrees in standing to reach overhead comfortably. Status at last note/certification: unable  Status at discharge: met    Goal: Pt will have Right shoulder strength at least 4+/5 to be able to perform ADLs comfortably. Status at last note/certification: 4/5  Status at discharge: not met      Pt. Made limited progress towards goals. He continues to have significant pain with reaching with right UE. FOTO score decreased to 46 points. He continues to have decreased right shoulder MMT flex: 4/5 IR: 4+/5 ER: 3+/5. Right shoulder AROM did improve flex: 117 degrees scaption: 115 degrees with pain at end ranges. He was educated on following back up with physician secondary to lack of progress and to continue to perform his HEP. G-Codes (GP)  Carry    Goal  CJ= 20-39%   D/C  CK= 40-59%    The severity rating is based on clinical judgment and the FOTO score.       ASSESSMENT/RECOMMENDATIONS:  [x]Discontinue therapy: []Patient has reached or is progressing toward set goals      []Patient is non-compliant or has abdicated      [x]Due to lack of appreciable progress towards set goals    Will Kirkland, PT 7/13/2018 8:23 AM

## 2018-07-24 NOTE — ANCILLARY DISCHARGE INSTRUCTIONS
[facility] - Fabiola Mcghee  [address] [facility] [address] [facility]  [phone] fax    Physical Therapy Discharge Summary     Patient name: Donavon Soria Start of Care: 2018   Referral source: Nate Adler MD : 1945                         Medical Diagnosis: Tinnitus, unspecified ear [H93.19]  Balance problem [R26.89] Onset Date: 2018                         Treatment Diagnosis: Dizziness   Prior Hospitalization: see medical history Provider#: [id number]   Medications: Verified on Patient summary List    Comorbidities: HTN, Hearing impaired   Prior Level of Function: working full time; independent with all daily activities          Visits from Start of Care: 2    Missed Visits: 0        Summary of Care:              STG 1: Patient to be independent and compliant with HEP to improve C/S AROM for ADL's.   met                                        STG 2: Patient to roll B in bed with no reproducible dizziness. met  Long Term Goals: To be accomplished in 4 weeks:                        LTG 1: Patient to improve FOTO score by 5 points to indicate improved function. NA                        LTG 2: Patient to demonstrate C/S AROM WNL without increased pain. Met                        LTG 3: Patient to return to perform bending and overhead reaching activities with no reports of   dizziness. Met            Pt reported that he has minimal to no reports of dizziness. His sx were manageable and he did not require Therapy.    Pt D/C'd  G-Codes (GP)  Mobility    Goal  CI= 1-19%  D/C  CI= 1-19%    ASSESSMENT/RECOMMENDATIONS:  [x]Discontinue therapy: []Patient has reached or is progressing toward set goals      []Patient is non-compliant or has abdicated      []Due to lack of appreciable progress towards set goals    Marco Hernandez, PT 2018 12:44 PM

## 2018-07-27 NOTE — PROGRESS NOTES
[facility]  [address]  [place], [address]  [phone] fax    Physical Therapy Discharge Summary    Patient name: Merry Rushing Start of Care: 2018   Referral source: Chery Tristan MD : 1945   Medical/Treatment Diagnosis: Pain in right shoulder [M25.511] Onset Date:2 weeks prior to start of care   Prior Hospitalization: see medical history Provider#: [id number]   Medications: Verified on Patient Summary List     Comorbidities: unremarkable per pt report   Prior Level of Function: unlimited with daily activity void of shoulder pain. Visits from Start of Care: 1    Missed Visits: 0  Reporting Period : 2018 to 2018    Summary of Care: No progress 2/2 lack of f/u. G-Codes (GP)    Carry   Current  CK= 40-59%   [id number] Goal  CJ= 20-39%   D/C  CK= 40-59%    The severity rating is based on clinical judgment and the FOTO score. ASSESSMENT/RECOMMENDATIONS: Pt had demonstrated indications of large RTC involvement and was advised to hold PT f/u pending orthopaedic consult 2/2 anticipated possible surgical intervention. Pt has since not f/u in PT. DC from skilled PT at this time.   [x]Discontinue therapy: []Patient has reached or is progressing toward set goals      []Patient is non-compliant or has abdicated      []Due to lack of appreciable progress towards set goals    Grace Hernadez 2018 11:26 AM

## 2018-10-17 ENCOUNTER — HOSPITAL ENCOUNTER (OUTPATIENT)
Dept: PHYSICAL THERAPY | Age: 73
Discharge: HOME | End: 2018-10-17
Payer: MEDICARE

## 2018-10-17 PROCEDURE — 97161 PT EVAL LOW COMPLEX 20 MIN: CPT

## 2018-10-17 PROCEDURE — G8984 CARRY CURRENT STATUS: HCPCS

## 2018-10-17 PROCEDURE — 97110 THERAPEUTIC EXERCISES: CPT

## 2018-10-17 PROCEDURE — G8985 CARRY GOAL STATUS: HCPCS

## 2018-10-17 NOTE — PROGRESS NOTES
PT DAILY TREATMENT NOTE/SHOULDER EVAL 10-18    Patient Name: Gemini Mas. Date:10/17/2018  : 1945  [x]  Patient  Verified  Payor: Francesco Colbert / Plan: [place] MEDICARE PART A & B / Product Type: Medicare /    In time: 3:30  Out time: 4:10  Total Treatment Time (min): 40  Visit #: 1 of 24    Medicare/BCBS Only   Total Timed Codes (min):  10 1:1 Treatment Time:  40       Treatment Area: H/O total shoulder replacement, right [Z96.611]    SUBJECTIVE  Pain Level (0-10 scale):  1/10  []constant []intermittent []improving []worsening []no change since onset    Any medication changes, allergies to medications, adverse drug reactions, diagnosis change, or new procedure performed?: [x] No    [] Yes (see summary sheet for update)  Subjective functional status/changes:     PLOF: right hand dominant, golfing, riding motorcycle, yard work  Mechanism of Injury:  18. Right reverese total shoulder replacement. Had home health PT. Reports he has been doing elbow and wrist exercises. Gets help with the sling and the shirt. Help with showering. Denies numbness/tinging. Reports having some neck pain. Sleeps in recliner. Work Hx: delivers auto parts. Driving.  Lifting up to 100#  Living Situation: lives with wife  Pt Goals: to get arm back to normal    OBJECTIVE/EXAMINATION    10 min Therapeutic Exercise:  [x] See flow sheet : HEP and shoulder precautions   Rationale: increase ROM and increase strength to improve the patients ability to increase ease of ADLs          With   [x] TE   [] TA   [] neuro   [] other: Patient Education: [x] Review HEP    [] Progressed/Changed HEP based on:   [] positioning   [] body mechanics   [] transfers   [] heat/ice application    [] other:      Physical Therapy Evaluation - Shoulder    Posture: [] Poor    [x] Fair    [] Good    Describe: fwd shoulder, fwd head    ROM:  [x] Unable to assess at this time                                           AROM PROM   Left Right  Left Right   Flexion WNL  Flexion     Extension   Extension     Scaption/ABD WNL  Scaptin/ABD     ER @ 0 Degrees   ER @ 0 Degrees     ER @ 90 Degrees WNL  ER @ 90 Degrees     IR @ 90 Degrees   IR @ 90 Degrees       End Feel / Painful Arc:    Strength:   [x] Unable to assess at this time                                                                            L (1-5) R (1-5) Pain   Flexors 4+  [] Yes   [] No   Abductors 4+  [] Yes   [] No   External Rotators 4+  [] Yes   [] No   Internal Rotators 4+  [] Yes   [] No   Supraspinatus   [] Yes   [] No   Serratus Anterior   [] Yes   [] No   Lower Trapezius   [] Yes   [] No   Elbow Flexion 4+  [] Yes   [] No   Elbow Extension 4+  [] Yes   [] No     Palpation  [x] Min  [] Mod  [] Severe    Location: anterior shoulder  [] Min  [] Mod  [] Severe    Location:  [] Min  [] Mod  [] Severe    Location:    Optional Tests:    Sensation Left Right Reflexes Left Right   Biceps (C5)   Biceps (C5)     Sandoval Radial(C6-7)   Brachioradialis (C6)     Sandoval Ulnar(C8-T1)   Triceps (C7)         Other Tests / Comments:   Incisions appear to be healing well. He had a band-aid on proximal incision secondary to having some drainage. No excessive redness was noted. Supination AROM 40 degrees.  Right elbow AROM flexion: 130 degrees       Pain Level (0-10 scale) post treatment: 1/10    ASSESSMENT/Changes in Function:      [x]  See Plan of Care  []  See progress note/recertification  []  See Discharge Summary         Progress towards goals / Updated goals:  See POC    PLAN  []  Upgrade activities as tolerated     [x]  Continue plan of care  []  Update interventions per flow sheet       []  Discharge due to:_  []  Other:_      Milagro Bonilla PT 10/17/2018  3:32 PM

## 2018-10-17 NOTE — PROGRESS NOTES
[facility]  [address] [place] [address]  [phone] fax    Plan of Care/ Statement of Necessity for Physical Therapy Services    Patient name: Dee Dee Hamilton Start of Care: 10/17/2018   Referral source: Yosi Howard, * : 1945    Medical Diagnosis: H/O total shoulder replacement, right [Z96.611]   Onset Date: 18    Treatment Diagnosis: right shoulder pain   Prior Hospitalization: see medical history Provider#: [id number]   Medications: Verified on Patient summary List    Comorbidities:  HTN, hearing impaired   Prior Level of Function: Ind with ADLs, right hand dominant, working, golf      The Plan of Care and following information is based on the information from the initial evaluation. Assessment/ key information:  Pt. Is a 67year old male s/p right reverse total shoulder arthroplasty. He reports he has some home health PT after his hospital stay. He reports compliance with wearing his sling. Incision appears to be healing well with no excessive redness noted, and he has one band-aid on proximal incision secondary to drainage. He reports getting assistance with donning/doffing clothes and and sling. He presents with good left shoulder AROM. Right shoulder PROM was not assessed secondary to protocol. Right elbow flexion AROM was 130 degrees and supination was limited at 40 degrees. Per protocol PT will not begin until 5 weeks pos-op, but he was educated on HEP for wrist and elbow mobility. Skilled PT is medically necessary in order to improve right shoulder mobility and strength for increased ease of ADLs and return to PLOF.      Evaluation Complexity History MEDIUM  Complexity : 1-2 comorbidities / personal factors will impact the outcome/ POC ; Examination LOW Complexity : 1-2 Standardized tests and measures addressing body structure, function, activity limitation and / or participation in recreation  ;Presentation LOW Complexity : Stable, uncomplicated  ;Clinical Decision Making HIGH Complexity : FOTO score of 1- 25   Overall Complexity Rating: LOW   Problem List: pain affecting function, decrease ROM, decrease strength, decrease ADL/ functional abilitiies, decrease activity tolerance, decrease flexibility/ joint mobility and decrease transfer abilities   Treatment Plan may include any combination of the following: Therapeutic exercise, Therapeutic activities, Neuromuscular re-education, Physical agent/modality, Gait/balance training, Manual therapy, Patient education, Self Care training, Functional mobility training and Home safety training  Patient / Family readiness to learn indicated by: asking questions  Persons(s) to be included in education: patient (P)  Barriers to Learning/Limitations: None  Patient Goal (s): to get back to normal  Patient Self Reported Health Status: good  Rehabilitation Potential: fair    Short Term Goals: To be accomplished in 2 weeks  1. Patient will demonstrate compliance with HEP in order to improve right shoulder mobility. Long Term Goals: To be accomplished in 8 weeks:  1. Patient will improve FOTO score by 47 points in order to demonstrate a significant improvement in function. 2. Patient will improve right elbow supination to 60 degrees in order to increase ease of ADLs. 3. Patient will be Ind with donning/doffing clothes in order to increase independence at home. 4. Patient will perform supine AAROM flexion to 120 degrees in order to increase ease of reaching at home. Frequency / Duration: Patient to be seen 3 times per week for 8 weeks. Patient/ Caregiver education and instruction: Diagnosis, prognosis, exercises   [x]  Plan of care has been reviewed with PTA    G-Codes (GP)  Carry   Current  CM= 80-99%    Goal  CK= 40-59%    The severity rating is based on clinical judgment and the FOTO score.     Certification Period:  10/17/18-12/16/18    Parish Acosta, PT 10/17/2018 4:24 PM    ________________________________________________________________________    I certify that the above Therapy Services are being furnished while the patient is under my care. I agree with the treatment plan and certify that this therapy is necessary.     Physician's Signature:____________Date:_________TIME:________    ** Signature, Date and Time must be completed for valid certification **    Please sign and return to [facility]  [address] [place] [address]  [phone] fax

## 2018-10-29 ENCOUNTER — HOSPITAL ENCOUNTER (OUTPATIENT)
Dept: PHYSICAL THERAPY | Age: 73
Discharge: HOME | End: 2018-10-29
Payer: MEDICARE

## 2018-10-29 PROCEDURE — 97110 THERAPEUTIC EXERCISES: CPT

## 2018-10-29 PROCEDURE — 97140 MANUAL THERAPY 1/> REGIONS: CPT

## 2018-10-29 PROCEDURE — 97016 VASOPNEUMATIC DEVICE THERAPY: CPT

## 2018-10-29 NOTE — PROGRESS NOTES
PT DAILY TREATMENT NOTE 10-18    Patient Name: Myrtle Coreas. Date:10/29/2018  : 1945  [x]  Patient  Verified  Payor: [employer] / Plan: [place] MEDICARE PART A & B / Product Type: Medicare /    In time: 8:00  Out time: 8:45  Total Treatment Time (min): 45  Visit #: 2 of 24    Medicare/BCBS Only   Total Timed Codes (min):  30 1:1 Treatment Time:  30       Treatment Area: Pain in right shoulder [M25.511]    SUBJECTIVE  Pain Level (0-10 scale): 0/10  Any medication changes, allergies to medications, adverse drug reactions, diagnosis change, or new procedure performed?: [x] No    [] Yes (see summary sheet for update)  Subjective functional status/changes:   [] No changes reported  Pt.  Reports his shoulder is feeling great    OBJECTIVE    Modality rationale: decrease edema, decrease inflammation and decrease pain to improve the patients ability to increase ease of ADLs   Type Additional Details   [] Estim:  []Unatt       []IFC  []Premod                        []Other:  []w/ice   []w/heat  Position:  Location:   [] Estim: []Att    []TENS instruct  []NMES                    []Other:  []w/US   []w/ice   []w/heat  Position:  Location:   []  Traction: [] Cervical       []Lumbar                       [] Prone          []Supine                       []Intermittent   []Continuous Lbs:  [] before manual  [] after manual   []  Ultrasound: []Continuous   [] Pulsed                           []1MHz   []3MHz W/cm2:  Location:   []  Iontophoresis with dexamethasone         Location: [] Take home patch   [] In clinic   []  Ice     []  heat  []  Ice massage  []  Laser   []  Anodyne Position:  Location:   []  Laser with stim  []  Other:  Position:  Location:   [x]  Vasopneumatic Device 15 min                                                      Seated   Right shoulder   [x] Skin assessment post-treatment:  [x]intact []redness- no adverse reaction    []redness - adverse reaction:     22 min Therapeutic Exercise:  [x] See flow sheet :   Rationale: increase ROM and increase strength to improve the patients ability to increase ease of ADLs    8 min Manual Therapy:  Trigger point release, scap mobs   Rationale: decrease pain, increase ROM and increase tissue extensibility to increase ease of ADLs          With   [x] TE   [] TA   [] neuro   [] other: Patient Education: [x] Review HEP    [] Progressed/Changed HEP based on:   [] positioning   [] body mechanics   [] transfers   [] heat/ice application    [] other:      Other Objective/Functional Measures:   Pt. Tolerated PT well with no reports of increased pain  He had good form with pendulums today  He was provided with larger print handouts for his HEP     Pain Level (0-10 scale) post treatment: 0/10    ASSESSMENT/Changes in Function:  Pt. Initiated PT and is compliant with his HEP. Patient will continue to benefit from skilled PT services to modify and progress therapeutic interventions, address functional mobility deficits, address ROM deficits, address strength deficits, analyze and address soft tissue restrictions, analyze and cue movement patterns, analyze and modify body mechanics/ergonomics and assess and modify postural abnormalities to attain remaining goals. Progress towards goals / Updated goals:  Short Term Goals: To be accomplished in 2 weeks  1. Patient will demonstrate compliance with HEP in order to improve right shoulder mobility. met     Long Term Goals: To be accomplished in 8 weeks:  1. Patient will improve FOTO score by 47 points in order to demonstrate a significant improvement in function. 2. Patient will improve right elbow supination to 60 degrees in order to increase ease of ADLs. 3. Patient will be Ind with donning/doffing clothes in order to increase independence at home. 4. Patient will perform supine AAROM flexion to 120 degrees in order to increase ease of reaching at home.         PLAN  []  Upgrade activities as tolerated     [x]  Continue plan of care  []  Update interventions per flow sheet       []  Discharge due to:_  []  Other:_      Andres Carr, PT 10/29/2018  7:59 AM    Future Appointments   Date Time Provider Denisse Swan   10/29/2018  8:00 AM Cemisti Steinberg, PT [facility]   10/31/2018  4:30 PM [facility]il [facility]   11/2/2018  4:30 PM [facility]   11/5/2018  3:30 PM [facility]il Maryan, [facility]   11/7/2018  3:30 PM Ceil Maryan, PT [facility]   11/9/2018  4:00 PM Ceil Maryan, PT [facility]   11/12/2018  3:30 PM [facility]il Maryan, [facility]   11/14/2018  3:30 PM Ceil Maryan, PT [facility]   11/16/2018  4:00 PM Ceil Maryan, PT [facility]   11/19/2018  3:30 PM Ceil Maryan, PT [facility]   11/21/2018  3:30 PM Ceil Maryan, PT [facility]   11/26/2018  4:00 PM Ceil Maryan, PT [facility]   11/28/2018  4:00 PM Ceil Maryan, PT [facility]   11/30/2018  4:00 PM Ceil Maryan, PT [facility]

## 2018-10-31 ENCOUNTER — HOSPITAL ENCOUNTER (OUTPATIENT)
Dept: PHYSICAL THERAPY | Age: 73
Discharge: HOME | End: 2018-10-31
Payer: MEDICARE

## 2018-10-31 PROCEDURE — 97016 VASOPNEUMATIC DEVICE THERAPY: CPT

## 2018-10-31 PROCEDURE — 97140 MANUAL THERAPY 1/> REGIONS: CPT

## 2018-10-31 PROCEDURE — 97110 THERAPEUTIC EXERCISES: CPT

## 2018-10-31 NOTE — PROGRESS NOTES
PT DAILY TREATMENT NOTE 10-18    Patient Name: Ismael Espinal. Date:10/31/2018  : 1945  [x]  Patient  Verified  Payor: [place] MEDICARE / Plan: [place] MEDICARE PART A & B / Product Type: Medicare /    In time: 4:30  Out time: 5:15  Total Treatment Time (min): 45  Visit #: 3 of 24    Medicare/BCBS Only   Total Timed Codes (min):  30 1:1 Treatment Time:  30       Treatment Area: Pain in right shoulder [M25.511]    SUBJECTIVE  Pain Level (0-10 scale):  0/10  Any medication changes, allergies to medications, adverse drug reactions, diagnosis change, or new procedure performed?: [x] No    [] Yes (see summary sheet for update)  Subjective functional status/changes:   [] No changes reported  Pt. Reports he is feeling pretty good today.      OBJECTIVE    Modality rationale: decrease edema, decrease inflammation and decrease pain to improve the patients ability to increase ease of ADLs   Type Additional Details   [] Estim:  []Unatt       []IFC  []Premod                        []Other:  []w/ice   []w/heat  Position:  Location:   [] Estim: []Att    []TENS instruct  []NMES                    []Other:  []w/US   []w/ice   []w/heat  Position:  Location:   []  Traction: [] Cervical       []Lumbar                       [] Prone          []Supine                       []Intermittent   []Continuous Lbs:  [] before manual  [] after manual   []  Ultrasound: []Continuous   [] Pulsed                           []1MHz   []3MHz W/cm2:  Location:   []  Iontophoresis with dexamethasone         Location: [] Take home patch   [] In clinic   []  Ice     []  heat  []  Ice massage  []  Laser   []  Anodyne Position:  Location:   []  Laser with stim  []  Other:  Position:  Location:   [x]  Vasopneumatic Device Pressure:       [x] lo [] med [] hi   Temperature: [x] lo [] med [] hi   [x] Skin assessment post-treatment:  [x]intact []redness- no adverse reaction    []redness - adverse reaction:     22 min Therapeutic Exercise:  [x] See flow sheet :   Rationale: increase ROM and increase strength to improve the patients ability to increase ease of ADLs    8 min Manual Therapy:  scap mobs, trigger point release infraspinatus   Rationale: decrease pain, increase ROM and increase tissue extensibility to increase ease of ADLs          With   [x] TE   [] TA   [] neuro   [] other: Patient Education: [x] Review HEP    [] Progressed/Changed HEP based on:   [] positioning   [] body mechanics   [] transfers   [] heat/ice application    [] other:      Other Objective/Functional Measures:   Pt. Continues to have pain with end range stick AAROM and was re-educated on not pushing into pain  He require excessive cueing to perform abduction correctly  Pt. Has decreased supination mobility     Pain Level (0-10 scale) post treatment: 0/10    ASSESSMENT/Changes in Function:  Pt. Is progressing with physical therapy. He is mostly compliant with his sling use but has been taking it off during the day occasionally and sleeping without it at times. His pain has been well controlled. Patient will continue to benefit from skilled PT services to modify and progress therapeutic interventions, address functional mobility deficits, address ROM deficits and address strength deficits to attain remaining goals. Progress towards goals / Updated goals:  Short Term Goals: To be accomplished in 2 weeks  1. Patient will demonstrate compliance with HEP in order to improve right shoulder mobility.   met     Long Term Goals: To be accomplished in 8 weeks:  1. Patient will improve FOTO score by 47 points in order to demonstrate a significant improvement in function.   2. Patient will improve right elbow supination to 60 degrees in order to increase ease of ADLs. Not met: pt. Continues to have significant decrease in supination mobility (10/31/18)  3. Patient will be Ind with donning/doffing clothes in order to increase independence at home.    4. Patient will perform supine AAROM flexion to 120 degrees in order to increase ease of reaching at home.         PLAN  []  Upgrade activities as tolerated     [x]  Continue plan of care  []  Update interventions per flow sheet       []  Discharge due to:_  []  Other:_      Audi Martinez PT 10/31/2018  4:31 PM    Future Appointments   Date Time Provider Denisse Sosa   11/2/2018  4:30 PM Esme Kelly, PT [facility]   11/5/2018  3:30 PM Esme Kelly, PT [facility]   11/7/2018  3:30 PM Esme Kelly, PT [facility]   11/9/2018  4:00 PM Esme Kelly, PT [facility]   11/12/2018  3:30 PM Esme Kelly, PT [facility]   11/14/2018  3:30 PM Esme Kelly, PT [facility]   11/16/2018  4:00 PM Esme Kelly, PT [facility]   11/19/2018  3:30 PM Esme Kelly, PT [facility]   11/21/2018  3:30 PM Esme Kelly, PT [facility]   11/26/2018  4:00 PM Esme Kelly, PT [facility]   11/28/2018  4:00 PM Esme Kelly, PT [facility]   11/30/2018  4:00 PM Esme Kelly, PT [facility]

## 2018-11-02 ENCOUNTER — HOSPITAL ENCOUNTER (OUTPATIENT)
Dept: PHYSICAL THERAPY | Age: 73
Discharge: HOME | End: 2018-11-02
Payer: MEDICARE

## 2018-11-02 PROCEDURE — 97110 THERAPEUTIC EXERCISES: CPT

## 2018-11-02 PROCEDURE — 97140 MANUAL THERAPY 1/> REGIONS: CPT

## 2018-11-02 PROCEDURE — 97016 VASOPNEUMATIC DEVICE THERAPY: CPT

## 2018-11-02 NOTE — PROGRESS NOTES
PT DAILY TREATMENT NOTE 10-18 Patient Name: Yue Lopez. Date:2018 : 1945 [x]  Patient  Verified Payor: [place] MEDICARE / Plan: Tyler Tirado / Product Type: Medicare / In time: 4:22  Out time: 5:10 Total Treatment Time (min): 48 Visit #: 4 of 24 Medicare/BCBS Only Total Timed Codes (min):  33 1:1 Treatment Time:  35 Treatment Area: Pain in right shoulder [M25.511] SUBJECTIVE Pain Level (0-10 scale):  4/10 Any medication changes, allergies to medications, adverse drug reactions, diagnosis change, or new procedure performed?: [x] No    [] Yes (see summary sheet for update) Subjective functional status/changes:   [] No changes reported Pt. Reports he had pain at 6/10 last night, not really sure why. OBJECTIVE Modality rationale: decrease edema, decrease inflammation and decrease pain to improve the patients ability to increase ease of ADLs Type Additional Details  
[] Estim:  []Unatt       []IFC  []Premod []Other:  []w/ice   []w/heat Position: Location:  
[] Estim: []Att    []TENS instruct  []NMES []Other:  []w/US   []w/ice   []w/heat Position: Location:  
[]  Traction: [] Cervical       []Lumbar 
                     [] Prone          []Supine []Intermittent   []Continuous Lbs: 
[] before manual 
[] after manual  
[]  Ultrasound: []Continuous   [] Pulsed []1MHz   []3MHz W/cm2: 
Location:  
[]  Iontophoresis with dexamethasone Location: [] Take home patch  
[] In clinic  
[]  Ice     []  heat 
[]  Ice massage 
[]  Laser  
[]  Anodyne Position: Location:  
[]  Laser with stim 
[]  Other:  Position: Location:  
[x]  Vasopneumatic Device Pressure:       [x] lo [] med [] hi  
Temperature: [x] lo [] med [] hi  
[x] Skin assessment post-treatment:  [x]intact []redness- no adverse reaction 
  []redness  adverse reaction: 25 min Therapeutic Exercise:  [x] See flow sheet :  
Rationale: increase ROM and increase strength to improve the patients ability to increase ease of ADLs 8 min Manual Therapy:  scap mobs Rationale: decrease pain, increase ROM and increase tissue extensibility to increase ease of ADLs With 
 [x] TE 
 [] TA 
 [] neuro 
 [] other: Patient Education: [x] Review HEP [] Progressed/Changed HEP based on:  
[] positioning   [] body mechanics   [] transfers   [] heat/ice application   
[] other:   
 
Other Objective/Functional Measures:  
Pt. Continues to have decreased supination mobility He was re-educated on avoiding activities which cause pain He has significant tightness and trigger points in infraspinatus, rhomboids, and pec major with hypersensitivity to palpation He had decreased pain with game ready and was educated on use ice more at home for pain control Pain Level (0-10 scale) post treatment: 0/10 ASSESSMENT/Changes in Function:  Pt. Is making slow progress towards goals. He had increased pain today with no real cause and was educated on using ice to reduce pain. He has significant tightness and trigger points in posterior shoulder Patient will continue to benefit from skilled PT services to modify and progress therapeutic interventions, address functional mobility deficits, address ROM deficits, address strength deficits, analyze and address soft tissue restrictions, analyze and cue movement patterns, analyze and modify body mechanics/ergonomics and assess and modify postural abnormalities to attain remaining goals. Progress towards goals / Updated goals: 
Short Term Goals: To be accomplished in 2 weeks 1. Patient will demonstrate compliance with HEP in order to improve right shoulder mobility.  
met 
  
Long Term Goals: To be accomplished in 8 weeks: 1.  Patient will improve FOTO score by 47 points in order to demonstrate a significant improvement in function.  
 2. Patient will improve right elbow supination to 60 degrees in order to increase ease of ADLs. Not met: pt. Continues to have significant decrease in supination mobility (10/31/18) 3. Patient will be Ind with donning/doffing clothes in order to increase independence at home. 4. Patient will perform supine AAROM flexion to 120 degrees in order to increase ease of reaching at home. PLAN 
[]  Upgrade activities as tolerated     [x]  Continue plan of care 
[]  Update interventions per flow sheet      
[]  Discharge due to:_ 
[]  Other:_ Josef Zavala, PT 11/2/2018  4:27 PM 
 
Future Appointments Date Time Provider Denisse Swan 11/2/2018  4:30 PM Junaid Halo, PT [facility] [address]  
11/5/2018  3:30 PM Junaid Halo, PT [facility] [address]  
11/7/2018  3:30 PM Junaid Halo, PT [facility] [address]  
11/9/2018  4:00 PM Junaid Halo, PT [facility] [address]  
11/12/2018  3:30 PM Junaid Halo, PT [facility] [address]  
11/14/2018  3:30 PM Junaid Halo, PT [facility] [address]  
11/16/2018  4:00 PM Junaid Halo, PT [facility] [address]  
11/19/2018  3:30 PM Junaid Halo, PT [facility] [address]  
11/21/2018  3:30 PM Junaid Halo, PT [facility] [address]  
11/26/2018  4:00 PM Junaid Halo, PT [facility] [address]  
11/28/2018  4:00 PM Junaid Halo, PT [facility] [address]  
11/30/2018  4:00 PM Junaid Halo, PT [facility] [address]

## 2018-11-05 ENCOUNTER — HOSPITAL ENCOUNTER (OUTPATIENT)
Dept: PHYSICAL THERAPY | Age: 73
Discharge: HOME | End: 2018-11-05
Payer: MEDICARE

## 2018-11-05 PROCEDURE — 97110 THERAPEUTIC EXERCISES: CPT

## 2018-11-05 PROCEDURE — 97140 MANUAL THERAPY 1/> REGIONS: CPT

## 2018-11-05 PROCEDURE — 97016 VASOPNEUMATIC DEVICE THERAPY: CPT

## 2018-11-05 NOTE — PROGRESS NOTES
PT DAILY TREATMENT NOTE 10-18 Patient Name: Rahul Domínguez. Date:2018 : 1945 [x]  Patient  Verified Payor: [place] [employer] / Plan: Tyler Lermay / Product Type: Medicare / In time: 3:30  Out time: 4:28 Total Treatment Time (min): 58 Visit #: 5 of 24 Medicare/BCBS Only Total Timed Codes (min):  33 1:1 Treatment Time:  35 Treatment Area: Pain in right shoulder [M25.511] SUBJECTIVE Pain Level (0-10 scale):  /10 Any medication changes, allergies to medications, adverse drug reactions, diagnosis change, or new procedure performed?: [x] No    [] Yes (see summary sheet for update) Subjective functional status/changes:   [] No changes reported Pt. Reports he is feeling better today. OBJECTIVE Modality rationale: decrease edema, decrease inflammation and decrease pain to improve the patients ability to increase ease of ADLs Type Additional Details  
[] Estim:  []Unatt       []IFC  []Premod []Other:  []w/ice   []w/heat Position: Location:  
[] Estim: []Att    []TENS instruct  []NMES []Other:  []w/US   []w/ice   []w/heat Position: Location:  
[]  Traction: [] Cervical       []Lumbar 
                     [] Prone          []Supine []Intermittent   []Continuous Lbs: 
[] before manual 
[] after manual  
[]  Ultrasound: []Continuous   [] Pulsed []1MHz   []3MHz W/cm2: 
Location:  
[]  Iontophoresis with dexamethasone Location: [] Take home patch  
[] In clinic  
[]  Ice     []  heat 
[]  Ice massage 
[]  Laser  
[]  Anodyne Position: Location:  
[]  Laser with stim 
[]  Other:  Position: Location:  
[x]  Vasopneumatic Device 15 min right shoulder in sitting  
[x] Skin assessment post-treatment:  [x]intact []redness- no adverse reaction 
  []redness  adverse reaction:  
 
25 min Therapeutic Exercise:  [x] See flow sheet :  
 Rationale: increase ROM and increase strength to improve the patients ability to increase ease of ADLs 8 min Manual Therapy:  scap mobs Rationale: decrease pain, increase ROM and increase tissue extensibility to increase ease of ADLs With 
 [x] TE 
 [] TA 
 [] neuro 
 [] other: Patient Education: [x] Review HEP [] Progressed/Changed HEP based on:  
[] positioning   [] body mechanics   [] transfers   [] heat/ice application   
[] other:   
 
Other Objective/Functional Measures: He continues to have decreased supination AROM and is challenged with elbow flex/extension AROM He was educated on updated HEP 
FOTO: 45 points Pt. Waited 10 min for game ready: no charge Pain Level (0-10 scale) post treatment: 2/10 ASSESSMENT/Changes in Function:  Pt. Is progressing slowly with physical therapy. He continues to have decreased elbow mobility and pain with shoulder AAROM. He is able to wean off of sling now. Patient will continue to benefit from skilled PT services to modify and progress therapeutic interventions, address functional mobility deficits, address ROM deficits, address strength deficits, analyze and address soft tissue restrictions, analyze and cue movement patterns, analyze and modify body mechanics/ergonomics and assess and modify postural abnormalities to attain remaining goals. Progress towards goals / Updated goals: 
Short Term Goals: To be accomplished in 2 weeks 1. Patient will demonstrate compliance with HEP in order to improve right shoulder mobility.  
met 
  
Long Term Goals: To be accomplished in 8 weeks: 1. Patient will improve FOTO score by 47 points in order to demonstrate a significant improvement in function.  
2. Patient will improve right elbow supination to 60 degrees in order to increase ease of ADLs.   
Not met: pt. Continues to have significant decrease in supination mobility (10/31/18) 3. Patient will be Ind with donning/doffing clothes in order to increase independence at home. 4. Patient will perform supine AAROM flexion to 120 degrees in order to increase ease of reaching at home. PLAN 
[]  Upgrade activities as tolerated     [x]  Continue plan of care 
[]  Update interventions per flow sheet      
[]  Discharge due to:_ 
[]  Other:_ Edycordelia Herrera, PT 11/5/2018  1:55 PM 
 
Future Appointments Date Time Provider Denisse Swan 11/5/2018  3:30 PM Meriam Sailors, PT [facility] [address]  
11/7/2018  3:30 PM Meriam Sailors, PT [facility] [address]  
11/9/2018  4:00 PM Meriam Sailors, PT [facility] [address]  
11/12/2018  3:30 PM Meriam Sailors, PT [facility] [address]  
11/14/2018  3:30 PM Meriam Sailors, PT [facility] [address]  
11/16/2018  4:00 PM Meriam Sailors, PT [facility] [address]  
11/19/2018  3:30 PM Meriam Sailors, PT [facility] [address]  
11/21/2018  3:30 PM Meriam Sailors, PT [facility] [address]  
11/26/2018  4:00 PM Meriam Sailors, PT [facility] [address]  
11/28/2018  4:00 PM Meriam Sailors, PT [facility] [address]  
11/30/2018  4:00 PM Meriam Sailors, PT [facility] [address]

## 2018-11-07 ENCOUNTER — HOSPITAL ENCOUNTER (OUTPATIENT)
Dept: PHYSICAL THERAPY | Age: 73
Discharge: HOME | End: 2018-11-07
Payer: MEDICARE

## 2018-11-07 PROCEDURE — 97016 VASOPNEUMATIC DEVICE THERAPY: CPT

## 2018-11-07 PROCEDURE — 97110 THERAPEUTIC EXERCISES: CPT

## 2018-11-07 PROCEDURE — 97140 MANUAL THERAPY 1/> REGIONS: CPT

## 2018-11-07 NOTE — PROGRESS NOTES
PT DAILY TREATMENT NOTE 10-18 Patient Name: Albertina Hunter. Date:2018 : 1945 [x]  Patient  Verified Payor: [place] MEDICARE / Plan: Tyler Lermay / Product Type: Medicare / In time: 3:20  Out time: 4:13 Total Treatment Time (min): 53 Visit #: 6 of 24 Medicare/BCBS Only Total Timed Codes (min):  38 1:1 Treatment Time:  45 Treatment Area: Pain in right shoulder [M25.511] SUBJECTIVE Pain Level (0-10 scale):  4/10 Any medication changes, allergies to medications, adverse drug reactions, diagnosis change, or new procedure performed?: [x] No    [] Yes (see summary sheet for update) Subjective functional status/changes:   [] No changes reported Pt. Reports he is sore from being out of the sling. He reports he OBJECTIVE Modality rationale: decrease edema, decrease inflammation and decrease pain to improve the patients ability to increase ease of ADLs Min Type Additional Details - Estim:  -Unatt       -IFC  -Premod      
                 -Other:  -w/ice   -w/heat Position: Location: - Estim: -Att    -TENS instruct  -NMES   
                -Other:  -w/US   -w/ice   -w/heat Position: Location: -  Traction: - Cervical       -Lumbar - Prone          -Supine 
                     -Intermittent   -Continuous Lbs: 
- before manual 
- after manual  
 -  Ultrasound: -Continuous   - Pulsed 
                         -1MHz   -3MHz W/cm2: 
Location: -  Iontophoresis with dexamethasone Location: - Take home patch  
- In clinic -  Ice     -  heat -  Ice massage -  Laser -  Anodyne Position: Location:  
 -  Laser with stim 
-  Other:  Position: Location:  
15 - x Vasopneumatic Device Pressure:       -x lo - med - hi  
Temperature: -x lo - med - hi  
-x Skin assessment post-treatment:  x-intact -redness- no adverse reaction 
  -redness  adverse reaction:  
30 min Therapeutic Exercise:  [x] See flow sheet :  
 Rationale: increase ROM and increase strength to improve the patients ability to increase ease of ADLs 8 min Manual Therapy:  scap mobs, trigger point release to infraspinatus Rationale: decrease pain, increase ROM and increase tissue extensibility to increase ease of ADLs With 
 [x] TE 
 [] TA 
 [] neuro 
 [] other: Patient Education: [x] Review HEP [] Progressed/Changed HEP based on:  
[] positioning   [] body mechanics   [] transfers   [] heat/ice application   
[] other:   
 
Other Objective/Functional Measures:  
AAROM flexion ~90 degrees Pt. Tolerated PT well with no reports of increased pain He was educated on using right UE for light tasks at home but to lift nothing heavy and avoid increased pain Pain Level (0-10 scale) post treatment: 2/10 ASSESSMENT/Changes in Function:  Pt. Is progressing with physical therapy. He is no longer using his sling but continues to have increased pain without sling. He continues to have decreased right shoulder mobility. Patient will continue to benefit from skilled PT services to modify and progress therapeutic interventions, address functional mobility deficits, address ROM deficits, address strength deficits, analyze and address soft tissue restrictions and analyze and cue movement patterns to attain remaining goals. Progress towards goals / Updated goals: 
Short Term Goals: To be accomplished in 2 weeks 1. Patient will demonstrate compliance with HEP in order to improve right shoulder mobility.  
met 
  
Long Term Goals: To be accomplished in 8 weeks: 1. Patient will improve FOTO score by 47 points in order to demonstrate a significant improvement in function.  
2. Patient will improve right elbow supination to 60 degrees in order to increase ease of ADLs.   
Not met: pt. Continues to have significant decrease in supination mobility (10/31/18) 3.  Patient will be Ind with donning/doffing clothes in order to increase independence at home. 4. Patient will perform supine AAROM flexion to 120 degrees in order to increase ease of reaching at home. Not met: 90 degrees (11/7/18) PLAN 
[]  Upgrade activities as tolerated     [x]  Continue plan of care 
[]  Update interventions per flow sheet      
[]  Discharge due to:_ 
[]  Other:_ Saulo Perry, PT 11/7/2018  3:18 PM 
 
Future Appointments Date Time Provider Denisse Swan 11/7/2018  3:30 PM Tanvi Senior, PT [facility]  
11/9/2018  4:00 PM Tanvi Seniro, PT [facility]  
11/12/2018  3:30 PM Tanvi Senior, PT [facility]  
11/14/2018  3:30 PM Tanvi Senior, PT [facility]  
11/16/2018  4:00 PM Tanvi Senior, PT [facility]  
11/19/2018  3:30 PM Lukiya Senior, PT [facility]  
11/21/2018  3:30 PM Agustinan Days, PT [facility]  
11/26/2018  4:00 PM Tanvi Senior, PT [facility]  
11/28/2018  4:00 PM Tanvi Senior, PT [facility]  
11/30/2018  4:00 PM Lulindaan Days, PT [facility]

## 2018-11-09 ENCOUNTER — HOSPITAL ENCOUNTER (OUTPATIENT)
Dept: PHYSICAL THERAPY | Age: 73
Discharge: HOME | End: 2018-11-09
Payer: MEDICARE

## 2018-11-09 PROCEDURE — 97016 VASOPNEUMATIC DEVICE THERAPY: CPT

## 2018-11-09 PROCEDURE — 97140 MANUAL THERAPY 1/> REGIONS: CPT

## 2018-11-09 PROCEDURE — 97110 THERAPEUTIC EXERCISES: CPT

## 2018-11-09 NOTE — PROGRESS NOTES
PT DAILY TREATMENT NOTE 10-18 Patient Name: Minnie Balderas Date:2018 : 1945 [x]  Patient  Verified Payor: [place] MEDICARE / Plan: Tyler Tirado / Product Type: Medicare / In time: 3:48  Out time: 4:45 Total Treatment Time (min): 57 Visit #: 7 of 24 Medicare/BCBS Only Total Timed Codes (min):  42 1:1 Treatment Time:  42 Treatment Area: Pain in right shoulder [M25.511] SUBJECTIVE Pain Level (0-10 scale): 4/10 Any medication changes, allergies to medications, adverse drug reactions, diagnosis change, or new procedure performed?: [x] No    [] Yes (see summary sheet for update) Subjective functional status/changes:   [] No changes reported Pt. Reports he is doing pretty good and has been doing his HEP OBJECTIVE Modality rationale: decrease edema, decrease inflammation and decrease pain to improve the patients ability to increase ease of ADLs Min Type Additional Details  
 [] Estim:  []Unatt       []IFC  []Premod []Other:  []w/ice   []w/heat Position: Location:  
 [] Estim: []Att    []TENS instruct  []NMES []Other:  []w/US   []w/ice   []w/heat Position: Location:  
 []  Traction: [] Cervical       []Lumbar 
                     [] Prone          []Supine []Intermittent   []Continuous Lbs: 
[] before manual 
[] after manual  
 []  Ultrasound: []Continuous   [] Pulsed []1MHz   []3MHz W/cm2: 
Location:  
 []  Iontophoresis with dexamethasone Location: [] Take home patch  
[] In clinic  
 []  Ice     []  heat 
[]  Ice massage 
[]  Laser  
[]  Anodyne Position: Location:  
 []  Laser with stim 
[]  Other:  Position: Location:  
15 [x]  Vasopneumatic Device Pressure:       [x] lo [] med [] hi  
Temperature: [x] lo [] med [] hi  
[x] Skin assessment post-treatment:  [x]intact []redness- no adverse reaction 
  []redness  adverse reaction: 34 min Therapeutic Exercise:  [x] See flow sheet :  
Rationale: increase ROM and increase strength to improve the patients ability to increase ease of ADLs 8 min Manual Therapy:  Trigger point release infraspinatus, scap mobs Rationale: decrease pain, increase ROM and increase tissue extensibility to increase ease of ADLs With 
 [x] TE 
 [] TA 
 [] neuro 
 [] other: Patient Education: [x] Review HEP [] Progressed/Changed HEP based on:  
[] positioning   [] body mechanics   [] transfers   [] heat/ice application   
[] other:   
 
Other Objective/Functional Measures:  
Pt. Tolerated PT well with no reports of increased pain He continues to be challenged with stick AAROM and require cues to perform abduction correctly Pain Level (0-10 scale) post treatment: 2/10 ASSESSMENT/Changes in Function:  Pt. Is progressing with physical therapy. He continues to have good pain control and demonstrates decreased right shoulder mobility. He also continues to demonstrate decreased shoulder strength. Patient will continue to benefit from skilled PT services to modify and progress therapeutic interventions, address functional mobility deficits, address ROM deficits, address strength deficits, analyze and address soft tissue restrictions, analyze and cue movement patterns and analyze and modify body mechanics/ergonomics to attain remaining goals. Progress towards goals / Updated goals: 
Short Term Goals: To be accomplished in 2 weeks 1. Patient will demonstrate compliance with HEP in order to improve right shoulder mobility.  
met 
  
Long Term Goals: To be accomplished in 8 weeks: 1. Patient will improve FOTO score by 47 points in order to demonstrate a significant improvement in function.  
2. Patient will improve right elbow supination to 60 degrees in order to increase ease of ADLs.   
Not met: pt. Continues to have significant decrease in supination mobility (10/31/18) 3. Patient will be Ind with donning/doffing clothes in order to increase independence at home. 4. Patient will perform supine AAROM flexion to 120 degrees in order to increase ease of reaching at home. Not met: 90 degrees (11/7/18) 
  
 
 
 
PLAN 
[]  Upgrade activities as tolerated     [x]  Continue plan of care 
[]  Update interventions per flow sheet      
[]  Discharge due to:_ 
[]  Other:_ Anoop Hwang, PT 11/9/2018  3:50 PM 
 
Future Appointments Date Time Provider Denisse Swan 11/9/2018  4:00 PM Amanda Lucio, PT [facility]  
11/12/2018  3:30 PM Amanda Naveed, PT [facility]  
11/14/2018  3:30 PM Amanda Naveed, PT [facility]  
11/16/2018  4:00 PM Amanda Naveed, PT [facility]  
11/19/2018  3:30 PM Amanda Naveed, PT [facility]  
11/21/2018  3:30 PM Amanda Naveed, PT [facility]  
11/26/2018  4:00 PM Amanda Naveed, PT [facility]  
11/28/2018  4:00 PM Amanda Naveed, PT [facility]  
11/30/2018  4:00 PM Amanda Naveed, PT [facility]

## 2018-11-12 ENCOUNTER — HOSPITAL ENCOUNTER (OUTPATIENT)
Dept: PHYSICAL THERAPY | Age: 73
Discharge: HOME | End: 2018-11-12
Payer: MEDICARE

## 2018-11-12 PROCEDURE — 97140 MANUAL THERAPY 1/> REGIONS: CPT

## 2018-11-12 PROCEDURE — 97016 VASOPNEUMATIC DEVICE THERAPY: CPT

## 2018-11-12 PROCEDURE — 97110 THERAPEUTIC EXERCISES: CPT

## 2018-11-12 NOTE — PROGRESS NOTES
PT DAILY TREATMENT NOTE 10-18 Patient Name: Rahul Domínguez. Date:2018 : 1945 [x]  Patient  Verified Payor: [place] MEDICARE / Plan: Tyler Miranda y / Product Type: Medicare / In time: 3:17  Out time: 4:15 Total Treatment Time (min): 58 Visit #: 8 of 24 Medicare/BCBS Only Total Timed Codes (min):  43 1:1 Treatment Time:  37 Treatment Area: Pain in right shoulder [M25.511] SUBJECTIVE Pain Level (0-10 scale): 7/10 Any medication changes, allergies to medications, adverse drug reactions, diagnosis change, or new procedure performed?: [x] No    [] Yes (see summary sheet for update) Subjective functional status/changes:   [] No changes reported Pt. Reports he is having a lot of pain today from not doing anything over the weekend. OBJECTIVE Modality rationale: decrease edema, decrease inflammation and decrease pain to improve the patients ability to increase ease of ADLs Min Type Additional Details  
 [] Estim:  []Unatt       []IFC  []Premod []Other:  []w/ice   []w/heat Position: Location:  
 [] Estim: []Att    []TENS instruct  []NMES []Other:  []w/US   []w/ice   []w/heat Position: Location:  
 []  Traction: [] Cervical       []Lumbar 
                     [] Prone          []Supine []Intermittent   []Continuous Lbs: 
[] before manual 
[] after manual  
 []  Ultrasound: []Continuous   [] Pulsed []1MHz   []3MHz W/cm2: 
Location:  
 []  Iontophoresis with dexamethasone Location: [] Take home patch  
[] In clinic  
 []  Ice     []  heat 
[]  Ice massage 
[]  Laser  
[]  Anodyne Position: Location:  
 []  Laser with stim 
[]  Other:  Position: Location:  
15 [x]  Vasopneumatic Device Pressure:       [x] lo [] med [] hi  
Temperature: [x] lo [] med [] hi  
[x] Skin assessment post-treatment:  [x]intact []redness- no adverse reaction []redness  adverse reaction:  
 
33 min Therapeutic Exercise:  [x] See flow sheet :  
Rationale: increase ROM and increase strength to improve the patients ability to increase ease of ADLs 10 min Manual Therapy:  Trigger point release infraspinatus and UT, scap mobs Rationale: decrease pain, increase ROM and increase tissue extensibility to increase ease of ADLs With 
 [x] TE 
 [] TA 
 [] neuro 
 [] other: Patient Education: [x] Review HEP [] Progressed/Changed HEP based on:  
[] positioning   [] body mechanics   [] transfers   [] heat/ice application   
[] other:   
 
Other Objective/Functional Measures:  
Right shoulder AAROM flex: 110 degrees He was educated on updated HEP He was re-educated on importance of his HEP Pain Level (0-10 scale) post treatment:  5/10 ASSESSMENT/Changes in Function:  Pt. Is progressing slowly towards goals. He continues to have fluctuating pain but does demonstrate some improvement in shoulder flexion AROM. Patient will continue to benefit from skilled PT services to modify and progress therapeutic interventions, address functional mobility deficits, address ROM deficits, address strength deficits, analyze and address soft tissue restrictions, analyze and cue movement patterns, analyze and modify body mechanics/ergonomics and assess and modify postural abnormalities to attain remaining goals. Progress towards goals / Updated goals: 
Short Term Goals: To be accomplished in 2 weeks 1. Patient will demonstrate compliance with HEP in order to improve right shoulder mobility.  
met 
  
Long Term Goals: To be accomplished in 8 weeks: 1. Patient will improve FOTO score by 47 points in order to demonstrate a significant improvement in function.  
2. Patient will improve right elbow supination to 60 degrees in order to increase ease of ADLs.   
Not met: pt. Continues to have significant decrease in supination mobility (10/31/18) 3. Patient will be Ind with donning/doffing clothes in order to increase independence at home. 4. Patient will perform supine AAROM flexion to 120 degrees in order to increase ease of reaching at home. Progressin degrees (18) PLAN 
[]  Upgrade activities as tolerated     [x]  Continue plan of care 
[]  Update interventions per flow sheet      
[]  Discharge due to:_ 
[]  Other:_ Tiana Maradiaga, PT 2018  3:18 PM 
 
Future Appointments Date Time Provider Denisse Swan 2018  3:30 PM Manuela Andrade, PT [facility]  
2018  3:30 PM Manuela Andrade, PT [facility]  
2018  4:00 PM Manuela Andrade, PT [facility]  
2018  3:30 PM Manuela Andrade, PT [facility]  
2018  3:30 PM Manuela Andrade, PT [facility]  
2018  4:00 PM Manuela Andrade, PT [facility]  

## 2018-11-14 ENCOUNTER — HOSPITAL ENCOUNTER (OUTPATIENT)
Dept: PHYSICAL THERAPY | Age: 73
Discharge: HOME | End: 2018-11-14
Payer: MEDICARE

## 2018-11-14 PROCEDURE — 97162 PT EVAL MOD COMPLEX 30 MIN: CPT

## 2018-11-14 PROCEDURE — 97016 VASOPNEUMATIC DEVICE THERAPY: CPT

## 2018-11-14 PROCEDURE — 97140 MANUAL THERAPY 1/> REGIONS: CPT

## 2018-11-14 PROCEDURE — 97110 THERAPEUTIC EXERCISES: CPT

## 2018-11-14 NOTE — PROGRESS NOTES
PT DAILY TREATMENT NOTE 10-18 Patient Name: Yue Lopez. Date:2018 : 1945 [x]  Patient  Verified Payor: [place] MED[place]ARE / Plan: Tyler Tirado / Product Type: Medicare / In time: 3:25  Out time: 4:10 Total Treatment Time (min): 45 Visit #: 9 of 24 Medicare/BCBS Only Total Timed Codes (min):  30 1:1 Treatment Time:  30 Treatment Area: Pain in right shoulder [M25.511] SUBJECTIVE Pain Level (0-10 scale):  3-4/10 Any medication changes, allergies to medications, adverse drug reactions, diagnosis change, or new procedure performed?: [x] No    [] Yes (see summary sheet for update) Subjective functional status/changes:   [] No changes reported Pt. Reports he came in yesterday because he had a lot of swelling in his forearm. He was advised to contact his physician about this. Pt. Reports he called physicians office and was prescribed new medication and reports decreased swelling today. OBJECTIVE Modality rationale: decrease edema, decrease inflammation and decrease pain to improve the patients ability to increase ease of ADLs Min Type Additional Details  
 [] Estim:  []Unatt       []IFC  []Premod []Other:  []w/ice   []w/heat Position: Location:  
 [] Estim: []Att    []TENS instruct  []NMES []Other:  []w/US   []w/ice   []w/heat Position: Location:  
 []  Traction: [] Cervical       []Lumbar 
                     [] Prone          []Supine []Intermittent   []Continuous Lbs: 
[] before manual 
[] after manual  
 []  Ultrasound: []Continuous   [] Pulsed []1MHz   []3MHz W/cm2: 
Location:  
 []  Iontophoresis with dexamethasone Location: [] Take home patch  
[] In clinic  
 []  Ice     []  heat 
[]  Ice massage 
[]  Laser  
[]  Anodyne Position: Location:  
 []  Laser with stim 
[]  Other:  Position: Location: 15 [x]  Vasopneumatic Device Pressure:       [x] lo [] med [] hi  
Temperature: [x] lo [] med [] hi  
[x] Skin assessment post-treatment:  [x]intact []redness- no adverse reaction 
  []redness  adverse reaction:  
 
22 min Therapeutic Exercise:  [x] See flow sheet :  
Rationale: increase ROM and increase strength to improve the patients ability to increase ease of ADLs 8 min Manual Therapy:  scap mobs, trigger point release infraspinatus Rationale: decrease pain, increase ROM and increase tissue extensibility to increase ease of ADLs With 
 [x] TE 
 [] TA 
 [] neuro 
 [] other: Patient Education: [x] Review HEP [] Progressed/Changed HEP based on:  
[] positioning   [] body mechanics   [] transfers   [] heat/ice application   
[] other:   
 
Other Objective/Functional Measures:  
Pt. Tolerated PT well with no reports of increased pain He has improving flexion AAROM mobility but continues to have significant diffiuclty with abduction and ER. ER at side is to neutral.  
He was challenged with table slides into scaption Pain Level (0-10 scale) post treatment: 3-4/10 ASSESSMENT/Changes in Function:  Pt. Is progressing slowly towards goals. He continues to have significant decrease in right shoulder mobility and decreased strength. He continues to have moderate pain in right shoulder. Patient will continue to benefit from skilled PT services to modify and progress therapeutic interventions, address functional mobility deficits, address ROM deficits, address strength deficits, analyze and address soft tissue restrictions, analyze and cue movement patterns, analyze and modify body mechanics/ergonomics and assess and modify postural abnormalities to attain remaining goals. Progress towards goals / Updated goals: 
Short Term Goals: To be accomplished in 2 weeks 1. Patient will demonstrate compliance with HEP in order to improve right shoulder mobility.  
met 
  
 Long Term Goals: To be accomplished in 8 weeks: 1. Patient will improve FOTO score by 47 points in order to demonstrate a significant improvement in function.  
2. Patient will improve right elbow supination to 60 degrees in order to increase ease of ADLs.   
Not met: pt. Continues to have significant decrease in supination mobility (10/31/18) 3. Patient will be Ind with donning/doffing clothes in order to increase independence at home. 4. Patient will perform supine AAROM flexion to 120 degrees in order to increase ease of reaching at home. Progressin degrees (18) PLAN 
[]  Upgrade activities as tolerated     [x]  Continue plan of care 
[]  Update interventions per flow sheet      
[]  Discharge due to:_ 
[]  Other:_ Ar Newman PT 2018  3:40 PM 
 
Future Appointments Date Time Provider Denisse Swan 2018  4:00 PM Josiah Diaz PT [facility] [address]  
2018  3:30 PM Josiah Diaz PT [facility] [address]  
2018  3:30 PM Josiah Diaz PT [facility] [address]  
2018  4:00 PM Josiah Diaz PT [facility] [address]  

## 2018-11-16 ENCOUNTER — HOSPITAL ENCOUNTER (OUTPATIENT)
Dept: PHYSICAL THERAPY | Age: 73
Discharge: HOME | End: 2018-11-16
Payer: MEDICARE

## 2018-11-16 PROCEDURE — 97110 THERAPEUTIC EXERCISES: CPT

## 2018-11-16 PROCEDURE — 97140 MANUAL THERAPY 1/> REGIONS: CPT

## 2018-11-16 PROCEDURE — 97016 VASOPNEUMATIC DEVICE THERAPY: CPT

## 2018-11-16 NOTE — PROGRESS NOTES
[facility] [address] [facility] 
[phone] fax Medicare Progress Report Patient name: Nunu Riley Start of Care:  10/17/18 Referral source: Mariel Gale, * : 1945 Medical/Treatment Diagnosis: Pain in right shoulder [M25.511] Payor: Mann Part / Plan: [place] MEDICARE PART A & B / Product Type: Medicare /  Onset Date: 18 Prior Hospitalization: see medical history Provider#: [id number] Medications: Verified on Patient Summary List   
Comorbidities:  HTN, hearing impaired Prior Level of Function: Ind with ADLs, right hand dominant, working, golf Visits from Start of Care: 10    Missed Visits: 0 Reporting Period: 10/17/18 to 18 Subjective Reports: pt. Reports he continues to have difficulty raising his arm Current Status/ treatment goals Objective measures 1. Patient will improve FOTO score by 47 points in order to demonstrate a significant improvement in function. [] met                 [] not met [x] progressing  eval: 4 Current: 45  
2. Patient will improve right elbow supination to 60 degrees in order to increase ease of ADLs. [] met                 [x] not met 
[] progressing Eval: 40 degrees Current: 45  
3. Patient will be Ind with donning/doffing clothes in order to increase independence at home. [] met                 [] not met [x] progressing Eval: requires assistance Current: occasional assistance 4. Patient will perform supine AAROM flexion to 120 degrees in order to increase ease of reaching at home. [] met                 [] not met 
[] progressing Eval: unable Current: 116 degrees Key functional changes:  Improving shoulder AAROM mobility. Increased ease of sleeping and getting dressed Problems/ barriers to goal attainment:  none Assessment / Recommendations: pt.  Is progressing slowly with physical therapy. He has been semi-compliant with his HEP. He continues to have significant decrease in right shoulder mobility but it has been gradually improve. AAROM with wand flex: 116 degrees, ER at side: 10 degrees, abd: 65 degrees. He continues to have decreased elbow supination AROM at 45 degrees. He reports being mostly independent with getting dressed but continues to require assistance at times. Skilled PT is medically necessary in order to improve right shoulder mobility and strength for increased ease of ADLs and improve quality of life. Problem List: pain affecting function, decrease ROM, decrease strength, impaired gait/ balance, decrease ADL/ functional abilitiies, decrease activity tolerance, decrease flexibility/ joint mobility and decrease transfer abilities Treatment Plan: Therapeutic exercise, Therapeutic activities, Neuromuscular re-education, Physical agent/modality, Gait/balance training, Manual therapy, Patient education, Self Care training and Functional mobility training Patient Goal (s) has been updated and includes: to have less pain Updated Goals to be accomplished in 14 treatments: 1. Patient will improve FOTO score by 47 points in order to demonstrate a significant improvement in function. 2. Patient will be Ind with donning/doffing clothes in order to increase independence at home. 3. Patient will perform supine AAROM flexion to 135 degrees and abduction to 90 degrees in order to increase ease of reaching at home. 
  
Frequency / Duration: Patient to be seen 2-3 times per week for 14 visits G-Codes (GP) Carry  Current  CK= 40-59%  Goal  CK= 40-59% The severity rating is based on clinical judgment and the FOTO score.  
 
Warden Baires, PT 11/16/2018 3:03 PM

## 2018-11-16 NOTE — PROGRESS NOTES
PT DAILY TREATMENT NOTE 10-18 Patient Name: Maicol Pina. Date:2018 : 1945 [x]  Patient  Verified Payor: [place] MEDICARE / Plan: Tyler Miranda y / Product Type: Medicare / In time:  4:00  Out time: 4:53 Total Treatment Time (min): 53 Visit #: 10 of 24 Medicare/BCBS Only Total Timed Codes (min):  38 1:1 Treatment Time:  45 Treatment Area: Pain in right shoulder [M25.511] SUBJECTIVE Pain Level (0-10 scale):  1/10 at rest. 7/10 with movement to side Any medication changes, allergies to medications, adverse drug reactions, diagnosis change, or new procedure performed?: [x] No    [] Yes (see summary sheet for update) Subjective functional status/changes:   [] No changes reported 
 pt. Reports he continues to have a lot of pain lifting arm up to side. He reports not doing his HEP due to pain OBJECTIVE Modality rationale: decrease edema, decrease inflammation and decrease pain to improve the patients ability to increase ease of ADLs Type Additional Details  
[] Estim:  []Unatt       []IFC  []Premod []Other:  []w/ice   []w/heat Position: Location:  
[] Estim: []Att    []TENS instruct  []NMES []Other:  []w/US   []w/ice   []w/heat Position: Location:  
[]  Traction: [] Cervical       []Lumbar 
                     [] Prone          []Supine []Intermittent   []Continuous Lbs: 
[] before manual 
[] after manual  
[]  Ultrasound: []Continuous   [] Pulsed []1MHz   []3MHz W/cm2: 
Location:  
[]  Iontophoresis with dexamethasone Location: [] Take home patch  
[] In clinic  
[]  Ice     []  heat 
[]  Ice massage 
[]  Laser  
[]  Anodyne Position: Location:  
[]  Laser with stim 
[]  Other:  Position: Location:  
[x]  Vasopneumatic Device 15 min Pressure:       [] lo [] med [] hi  
Temperature: [] lo [] med [] hi  
 [x] Skin assessment post-treatment:  [x]intact []redness- no adverse reaction 
  []redness  adverse reaction:  
 
30 min Therapeutic Exercise:  [x] See flow sheet :  
Rationale: increase ROM and increase strength to improve the patients ability to increase ease of ADLs 8 min Manual Therapy:  scap mobs, trigger point release infraspinatus and lats Rationale: decrease pain, increase ROM and increase tissue extensibility to increase ease of ADLs With 
 [x] TE 
 [] TA 
 [] neuro 
 [] other: Patient Education: [x] Review HEP [] Progressed/Changed HEP based on:  
[] positioning   [] body mechanics   [] transfers   [] heat/ice application   
[] other:   
 
Other Objective/Functional Measures: FOTO: 45 points AAROM flexion in supine: 116 degrees AAROM ER at side: 10 degrees AAROM abd in standin degrees Elbow supination Donning/doffing clothes: mostly independent but some help from wife Pt. Was educated on importance of his HEP Pain Level (0-10 scale) post treatment: 4/10 ASSESSMENT/Changes in Function:  
  
[]  See Plan of Care [x]  See progress note/recertification 
[]  See Discharge Summary Progress towards goals / Updated goals: 
See progress note PLAN 
[]  Upgrade activities as tolerated     [x]  Continue plan of care 
[]  Update interventions per flow sheet      
[]  Discharge due to:_ 
[]  Other:_ Jo Chavis PT 2018  2:58 PM 
 
Future Appointments Date Time Provider Denisse Swan 2018  4:00 PM Hettie Maple, PT [facility]  
2018  3:30 PM Hettie Maple, PT [facility]  
2018  3:30 PM Hettie Maple, PT [facility]  
2018  4:00 PM Hettie Maple, PT [facility]  

## 2018-11-19 ENCOUNTER — HOSPITAL ENCOUNTER (OUTPATIENT)
Dept: PHYSICAL THERAPY | Age: 73
Discharge: HOME | End: 2018-11-19
Payer: MEDICARE

## 2018-11-19 PROCEDURE — 97140 MANUAL THERAPY 1/> REGIONS: CPT

## 2018-11-19 PROCEDURE — 97110 THERAPEUTIC EXERCISES: CPT

## 2018-11-19 PROCEDURE — 97016 VASOPNEUMATIC DEVICE THERAPY: CPT

## 2018-11-19 NOTE — PROGRESS NOTES
PT DAILY TREATMENT NOTE 10-18 Patient Name: Maicol Pina. Date:2018 : 1945 [x]  Patient  Verified Payor: [place] MEDICARE / Plan: Tyler Lermay / Product Type: Medicare / In time: 3:28  Out time: 4:23 Total Treatment Time (min): 55 Visit #: 28 IA 16 Medicare/BCBS Only Total Timed Codes (min):  40 1:1 Treatment Time:  30 Treatment Area: Pain in right shoulder [M25.511] SUBJECTIVE Pain Level (0-10 scale): 3-4/10 Any medication changes, allergies to medications, adverse drug reactions, diagnosis change, or new procedure performed?: [x] No    [] Yes (see summary sheet for update) Subjective functional status/changes:   [] No changes reported Pt. Reports he is feeling about the same today. He reports continued difficulty lifting his arm to side. OBJECTIVE Modality rationale: decrease edema, decrease inflammation and decrease pain to improve the patients ability to increase ease of ADLs Min Type Additional Details  
 [] Estim:  []Unatt       []IFC  []Premod []Other:  []w/ice   []w/heat Position: Location:  
 [] Estim: []Att    []TENS instruct  []NMES []Other:  []w/US   []w/ice   []w/heat Position: Location:  
 []  Traction: [] Cervical       []Lumbar 
                     [] Prone          []Supine []Intermittent   []Continuous Lbs: 
[] before manual 
[] after manual  
 []  Ultrasound: []Continuous   [] Pulsed []1MHz   []3MHz W/cm2: 
Location:  
 []  Iontophoresis with dexamethasone Location: [] Take home patch  
[] In clinic  
 []  Ice     []  heat 
[]  Ice massage 
[]  Laser  
[]  Anodyne Position: Location:  
 []  Laser with stim 
[]  Other:  Position: Location:  
15 [x]  Vasopneumatic Device Pressure:       [x] lo [] med [] hi  
Temperature: [x] lo [] med [] hi  
[x] Skin assessment post-treatment:  [x]intact []redness- no adverse reaction []redness  adverse reaction:  
 
40 min Therapeutic Exercise:  [x] See flow sheet :  
Rationale: increase ROM and increase strength to improve the patients ability to increase ease of ADLs With 
 [x] TE 
 [] TA 
 [] neuro 
 [] other: Patient Education: [x] Review HEP [] Progressed/Changed HEP based on:  
[] positioning   [] body mechanics   [] transfers   [] heat/ice application   
[] other:   
 
Other Objective/Functional Measures:  
Pt. Tolerated PT well but continues to require cues throughout to only perform exercises in pain free range He was again re-educated on importance for his HEP Pt . Has some improvement in stick AAROM mobility in. flexion and abduction today Pain Level (0-10 scale) post treatment: 0/10 ASSESSMENT/Changes in Function:  Pt is progressing slowly towards goals. He continues to have decreased shoulder AAROM with significant decrease in right shoulder abduction. He was educated on importance of performing his HEP. Patient will continue to benefit from skilled PT services to modify and progress therapeutic interventions, address functional mobility deficits, address ROM deficits, address strength deficits, analyze and address soft tissue restrictions, analyze and cue movement patterns, analyze and modify body mechanics/ergonomics and assess and modify postural abnormalities to attain remaining goals. Progress towards goals / Updated goals: 1. Patient will improve FOTO score by 47 points in order to demonstrate a significant improvement in function.  
2. Patient will be Ind with donning/doffing clothes in order to increase independence at home. 3. Patient will perform supine AAROM flexion to 135 degrees and abduction to 90 degrees in order to increase ease of reaching at home.  
 
PLAN 
[]  Upgrade activities as tolerated     [x]  Continue plan of care 
[]  Update interventions per flow sheet      
[]  Discharge due to:_ 
[]  Other:_   
 
 Patricia Swain, PT 11/19/2018  3:45 PM 
 
Future Appointments Date Time Provider Denisse Swan 11/21/2018  3:30 PM Tiffanie Jose, PT [facility]  
11/26/2018  4:00 PM Tiffanie Jose, PT [facility]  
11/28/2018  4:00 PM Tiffanie Garcia, PT [facility]  
11/30/2018  4:00 PM Tiffanie Jose, PT [facility]

## 2018-11-21 ENCOUNTER — HOSPITAL ENCOUNTER (OUTPATIENT)
Dept: PHYSICAL THERAPY | Age: 73
Discharge: HOME | End: 2018-11-21
Payer: MEDICARE

## 2018-11-21 PROCEDURE — 97110 THERAPEUTIC EXERCISES: CPT

## 2018-11-21 PROCEDURE — 97016 VASOPNEUMATIC DEVICE THERAPY: CPT

## 2018-11-21 NOTE — PROGRESS NOTES
PT DAILY TREATMENT NOTE 10-18 Patient Name: Maicol Pina. Date:2018 : 1945 [x]  Patient  Verified Payor: [place] MEDICARE / Plan: Tyler Lermay / Product Type: Medicare / In time: 3:25  Out time: 4:15 Total Treatment Time (min): 50 Visit #: 12 of 24 Medicare/BCBS Only Total Timed Codes (min):  35 1:1 Treatment Time:  30 Treatment Area: Pain in right shoulder [M25.511] SUBJECTIVE Pain Level (0-10 scale):  1/10 Any medication changes, allergies to medications, adverse drug reactions, diagnosis change, or new procedure performed?: [x] No    [] Yes (see summary sheet for update) Subjective functional status/changes:   [] No changes reported Pt. Reports he is feeling pretty good today. He reports doing his HEP OBJECTIVE Modality rationale: decrease inflammation and decrease pain to improve the patients ability to increase ease of ADLs Min Type Additional Details  
 [] Estim:  []Unatt       []IFC  []Premod []Other:  []w/ice   []w/heat Position: Location:  
 [] Estim: []Att    []TENS instruct  []NMES []Other:  []w/US   []w/ice   []w/heat Position: Location:  
 []  Traction: [] Cervical       []Lumbar 
                     [] Prone          []Supine []Intermittent   []Continuous Lbs: 
[] before manual 
[] after manual  
 []  Ultrasound: []Continuous   [] Pulsed []1MHz   []3MHz W/cm2: 
Location:  
 []  Iontophoresis with dexamethasone Location: [] Take home patch  
[] In clinic  
 []  Ice     []  heat 
[]  Ice massage 
[]  Laser  
[]  Anodyne Position: Location:  
 []  Laser with stim 
[]  Other:  Position: Location:  
15 [x]  Vasopneumatic Device Pressure:       [x] lo [] med [] hi  
Temperature: [x] lo [] med [] hi  
[x] Skin assessment post-treatment:  [x]intact []redness- no adverse reaction 
  []redness  adverse reaction: 35 min Therapeutic Exercise:  [x] See flow sheet :  
Rationale: increase ROM and increase strength to improve the patients ability to increase ease of ADLs With 
 [x] TE 
 [] TA 
 [] neuro 
 [] other: Patient Education: [x] Review HEP [] Progressed/Changed HEP based on:  
[] positioning   [] body mechanics   [] transfers   [] heat/ice application   
[] other:   
 
Other Objective/Functional Measures:  
AAROM flex: 126 degrees ER at side: 2 degrees Pt. Was again re-educated on performing stick abduction at home He tolerated PT well no significant increase in pain Pain Level (0-10 scale) post treatment: 0/10 ASSESSMENT/Changes in Function:  Pt. Is progressing slowly towards goals. He demonstrates improving shoulder mobility but continues to have significant decrease in shoulder ER and abduction mobility. He was educated again about importance for HEP. Patient will continue to benefit from skilled PT services to modify and progress therapeutic interventions, address functional mobility deficits, address ROM deficits, address strength deficits, analyze and address soft tissue restrictions, analyze and cue movement patterns, analyze and modify body mechanics/ergonomics and assess and modify postural abnormalities to attain remaining goals. Progress towards goals / Updated goals: 1. Patient will improve FOTO score by 47 points in order to demonstrate a significant improvement in function.  
2. Patient will be Ind with donning/doffing clothes in order to increase independence at home.  
3. Patient will perform supine AAROM flexion to 135 degrees and abduction to 90 degrees in order to increase ease of reaching at home. PLAN 
[]  Upgrade activities as tolerated     [x]  Continue plan of care 
[]  Update interventions per flow sheet      
[]  Discharge due to:_ 
[]  Other:_ Subhash Castaneda, PT 11/21/2018  3:26 PM 
 
Future Appointments Date Time Provider Denisse Swan 11/21/2018  3:30 PM Bairon Banda, PT [facility]  
11/26/2018  4:00 PM Bairon Banda, PT [facility]  
11/28/2018  4:00 PM Bairon Banda, PT [facility]  
11/30/2018  4:00 PM Bairon Banda, PT [facility]

## 2018-11-26 ENCOUNTER — HOSPITAL ENCOUNTER (OUTPATIENT)
Dept: PHYSICAL THERAPY | Age: 73
Discharge: HOME | End: 2018-11-26
Payer: MEDICARE

## 2018-11-26 PROCEDURE — 97016 VASOPNEUMATIC DEVICE THERAPY: CPT

## 2018-11-26 PROCEDURE — 97110 THERAPEUTIC EXERCISES: CPT

## 2018-11-26 NOTE — PROGRESS NOTES
PT DAILY TREATMENT NOTE 10-18 Patient Name: Liliana Terrell. Date:2018 : 1945 [x]  Patient  Verified Payor: [place] [employer] / Plan: Tyler Tirado / Product Type: Medicare / In time: 3:48  Out time: 4:43 Total Treatment Time (min): 55 Visit #: 65 JA 24 Medicare/BCBS Only Total Timed Codes (min):  40 1:1 Treatment Time:  40 Treatment Area: Pain in right shoulder [M25.511] SUBJECTIVE Pain Level (0-10 scale): 1/10 Any medication changes, allergies to medications, adverse drug reactions, diagnosis change, or new procedure performed?: [x] No    [] Yes (see summary sheet for update) Subjective functional status/changes:   [] No changes reported Pt. Reports he continues to have pain reaching to side and behind but overall doing pretty good. OBJECTIVE Modality rationale: decrease edema, decrease inflammation and decrease pain to improve the patients ability to increase ease of ADLs Type Additional Details  
[] Estim:  []Unatt       []IFC  []Premod []Other:  []w/ice   []w/heat Position: Location:  
[] Estim: []Att    []TENS instruct  []NMES []Other:  []w/US   []w/ice   []w/heat Position: Location:  
[]  Traction: [] Cervical       []Lumbar 
                     [] Prone          []Supine []Intermittent   []Continuous Lbs: 
[] before manual 
[] after manual  
[]  Ultrasound: []Continuous   [] Pulsed []1MHz   []3MHz W/cm2: 
Location:  
[]  Iontophoresis with dexamethasone Location: [] Take home patch  
[] In clinic  
[]  Ice     []  heat 
[]  Ice massage 
[]  Laser  
[]  Anodyne Position: Location:  
[]  Laser with stim 
[]  Other:  Position: Location:  
[x]  Vasopneumatic Device Pressure:       [] lo [x] med [] hi  
Temperature: [x] lo [] med [] hi  
[x] Skin assessment post-treatment:  [x]intact []redness- no adverse reaction 
  []redness  adverse reaction: 40 min Therapeutic Exercise:  [x] See flow sheet :  
Rationale: increase ROM and increase strength to improve the patients ability to increase ease of ADLs With 
 [x] TE 
 [] TA 
 [] neuro 
 [] other: Patient Education: [x] Review HEP [] Progressed/Changed HEP based on:  
[] positioning   [] body mechanics   [] transfers   [] heat/ice application   
[] other:   
 
Other Objective/Functional Measures:  
Pt. Continues to be under 90 degrees with AAROM abduction He continues to have significant decrease in ER mobility Pt. Was challenged with supine exercises at 45 degrees angle He was challenged with wall slide into scaption Pain Level (0-10 scale) post treatment:  1-2/10 ASSESSMENT/Changes in Function:  Pt. Is progressing slowly towards goals. He continues to have significant decrease in right shoulder AAROM mobility with pain going into abduction and extension Patient will continue to benefit from skilled PT services to modify and progress therapeutic interventions, address functional mobility deficits, address ROM deficits, address strength deficits, analyze and address soft tissue restrictions, analyze and cue movement patterns, analyze and modify body mechanics/ergonomics and assess and modify postural abnormalities to attain remaining goals. Progress towards goals / Updated goals: 
. Patient will improve FOTO score by 47 points in order to demonstrate a significant improvement in function.  
2. Patient will be Ind with donning/doffing clothes in order to increase independence at home.  
3. Patient will perform supine AAROM flexion to 135 degrees and abduction to 90 degrees in order to increase ease of reaching at home. Not met: abduction below 90 degrees (11/26/18) PLAN 
[]  Upgrade activities as tolerated     [x]  Continue plan of care 
[]  Update interventions per flow sheet      
[]  Discharge due to:_ 
[]  Other:_ Jose Bethea, PT 11/26/2018  3:50 PM 
 Future Appointments Date Time Provider Denisse Swan 11/26/2018  4:00 PM Darylene Betters, PT [facility]  
11/28/2018  4:00 PM Darylene Betters, PT [facility]  
11/30/2018  4:00 PM Darylene Betters, PT [facility]

## 2018-11-28 ENCOUNTER — HOSPITAL ENCOUNTER (OUTPATIENT)
Dept: PHYSICAL THERAPY | Age: 73
Discharge: HOME | End: 2018-11-28
Payer: MEDICARE

## 2018-11-28 PROCEDURE — 97110 THERAPEUTIC EXERCISES: CPT

## 2018-11-28 PROCEDURE — 97016 VASOPNEUMATIC DEVICE THERAPY: CPT

## 2018-11-28 NOTE — PROGRESS NOTES
PT DAILY TREATMENT NOTE 10-18 Patient Name: Edy Ernandez. Date:2018 : 1945 [x]  Patient  Verified Payor: [place] MED[place] / Plan: Tyler Tirado / Product Type: Medicare / In time: 4:00  Out time: 4:45 Total Treatment Time (min): 45 Visit #: 14 of 24 Medicare/BCBS Only Total Timed Codes (min):  30 1:1 Treatment Time:  30 Treatment Area: Pain in right shoulder [M25.511] SUBJECTIVE Pain Level (0-10 scale):  1/10 Any medication changes, allergies to medications, adverse drug reactions, diagnosis change, or new procedure performed?: [x] No    [] Yes (see summary sheet for update) Subjective functional status/changes:   [] No changes reported Pt reports he is able to brush his teeth with right hand now. He continues to require assistance for his belt OBJECTIVE Modality rationale: decrease edema, decrease inflammation and decrease pain to improve the patients ability to increase ease of ADLs Min Type Additional Details  
 [] Estim:  []Unatt       []IFC  []Premod []Other:  []w/ice   []w/heat Position: Location:  
 [] Estim: []Att    []TENS instruct  []NMES []Other:  []w/US   []w/ice   []w/heat Position: Location:  
 []  Traction: [] Cervical       []Lumbar 
                     [] Prone          []Supine []Intermittent   []Continuous Lbs: 
[] before manual 
[] after manual  
 []  Ultrasound: []Continuous   [] Pulsed []1MHz   []3MHz W/cm2: 
Location:  
 []  Iontophoresis with dexamethasone Location: [] Take home patch  
[] In clinic  
 []  Ice     []  heat 
[]  Ice massage 
[]  Laser  
[]  Anodyne Position: Location:  
 []  Laser with stim 
[]  Other:  Position: Location:  
15 [x]  Vasopneumatic Device Pressure:       [] lo [x] med [] hi  
Temperature: [x] lo [] med [] hi  
[x] Skin assessment post-treatment:  [x]intact []redness- no adverse reaction []redness  adverse reaction: 
 
30 min Therapeutic Exercise:  [x] See flow sheet :  
Rationale: increase ROM and increase strength to improve the patients ability to increase ease of ADLs With 
 [x] TE 
 [] TA 
 [] neuro 
 [] other: Patient Education: [x] Review HEP [] Progressed/Changed HEP based on:  
[] positioning   [] body mechanics   [] transfers   [] heat/ice application   
[] other:   
 
Other Objective/Functional Measures:  
AAROM flex: 115 abd: 70 degrees Pt. Continues to have pain at end ranges of motion with most difficulty with abduction He continues to demonstrate decreased right shoulder strength Pain Level (0-10 scale) post treatment:  0/10 ASSESSMENT/Changes in Function:  Pt. Is progressing slowly towards goals. He continues to have pain with abduction and decreased shoulder mobility. He is able to perform more functional activities at home like brushing his teeth. Patient will continue to benefit from skilled PT services to modify and progress therapeutic interventions, address functional mobility deficits, address ROM deficits, address strength deficits, analyze and address soft tissue restrictions, analyze and cue movement patterns and analyze and modify body mechanics/ergonomics to attain remaining goals. Progress towards goals / Updated goals: 1. Patient will improve FOTO score by 47 points in order to demonstrate a significant improvement in function.  
2. Patient will be Ind with donning/doffing clothes in order to increase independence at home.  
3. Patient will perform supine AAROM flexion to 135 degrees and abduction to 90 degrees in order to increase ease of reaching at home. Not met: abduction below 90 degrees (11/26/18) PLAN 
[]  Upgrade activities as tolerated     [x]  Continue plan of care 
[]  Update interventions per flow sheet      
[]  Discharge due to:_ 
[]  Other:_ Mia Rose, PT 11/28/2018  4:06 PM 
 
 Future Appointments Date Time Provider Denisse Sosa 11/30/2018  4:00 PM [facility]

## 2018-11-30 ENCOUNTER — HOSPITAL ENCOUNTER (OUTPATIENT)
Dept: PHYSICAL THERAPY | Age: 73
Discharge: HOME | End: 2018-11-30
Payer: MEDICARE

## 2018-11-30 PROCEDURE — 97016 VASOPNEUMATIC DEVICE THERAPY: CPT

## 2018-11-30 PROCEDURE — 97110 THERAPEUTIC EXERCISES: CPT

## 2018-11-30 NOTE — PROGRESS NOTES
PT DAILY TREATMENT NOTE 10-18 Patient Name: Minnie Hope. Date:2018 : 1945 [x]  Patient  Verified Payor: [place] MEDICARE / Plan: Tyler Tirado / Product Type: Medicare / In time: 3:58  Out time: 4:49 Total Treatment Time (min): 51 Visit #: 15 of 24 Medicare/BCBS Only Total Timed Codes (min):  36 1:1 Treatment Time:  32 Treatment Area: Pain in right shoulder [M25.511] SUBJECTIVE Pain Level (0-10 scale):  4/10 Any medication changes, allergies to medications, adverse drug reactions, diagnosis change, or new procedure performed?: [] No    [] Yes (see summary sheet for update) Subjective functional status/changes:   [] No changes reported Pt. Reports having more pain in his bicep today OBJECTIVE Modality rationale: decrease edema, decrease inflammation and decrease pain to improve the patients ability to increase ease of ADLs Min Type Additional Details  
 [] Estim:  []Unatt       []IFC  []Premod []Other:  []w/ice   []w/heat Position: Location:  
 [] Estim: []Att    []TENS instruct  []NMES []Other:  []w/US   []w/ice   []w/heat Position: Location:  
 []  Traction: [] Cervical       []Lumbar 
                     [] Prone          []Supine []Intermittent   []Continuous Lbs: 
[] before manual 
[] after manual  
 []  Ultrasound: []Continuous   [] Pulsed []1MHz   []3MHz W/cm2: 
Location:  
 []  Iontophoresis with dexamethasone Location: [] Take home patch  
[] In clinic  
 []  Ice     []  heat 
[]  Ice massage 
[]  Laser  
[]  Anodyne Position: Location:  
 []  Laser with stim 
[]  Other:  Position: Location:  
15 [x]  Vasopneumatic Device Pressure:       [] lo [x] med [] hi  
Temperature: [x] lo [] med [] hi  
[x] Skin assessment post-treatment:  [x]intact []redness- no adverse reaction 
  []redness  adverse reaction: 36 min Therapeutic Exercise:  [x] See flow sheet :  
Rationale: increase ROM and increase strength to improve the patients ability to increase ease of ADLs With 
 [x] TE 
 [] TA 
 [] neuro 
 [] other: Patient Education: [x] Review HEP [] Progressed/Changed HEP based on:  
[] positioning   [] body mechanics   [] transfers   [] heat/ice application   
[] other:   
 
Other Objective/Functional Measures:  
Shoulder abduction AAROM at 45 degree incline: 83 degrees Pt. Tolerated PT well but continues to have pain at end ranges He continues to require cues to perform activities in pain free range Pain Level (0-10 scale) post treatment: 0/10 ASSESSMENT/Changes in Function:  Pt. Is progressing slowly towards goals. He continues to have decreased shoulder mobility but demonstrates some improvement in abduction AAROM. He continues to have decreased shoulder strength. Patient will continue to benefit from skilled PT services to modify and progress therapeutic interventions, address functional mobility deficits, address ROM deficits, address strength deficits, analyze and address soft tissue restrictions, analyze and cue movement patterns, analyze and modify body mechanics/ergonomics and assess and modify postural abnormalities to attain remaining goals. Progress towards goals / Updated goals: 1. Patient will improve FOTO score by 47 points in order to demonstrate a significant improvement in function.  
2. Patient will be Ind with donning/doffing clothes in order to increase independence at home.  
3. Patient will perform supine AAROM flexion to 135 degrees and abduction to 90 degrees in order to increase ease of reaching at home. Not met: abduction 83 degrees at 45 degree incline (11/30/18) PLAN 
[]  Upgrade activities as tolerated     [x]  Continue plan of care 
[]  Update interventions per flow sheet      
[]  Discharge due to:_ 
[]  Other:_   
 
 Jo Chavis, PT 11/30/2018  4:03 PM 
 
No future appointments.

## 2018-12-03 ENCOUNTER — APPOINTMENT (OUTPATIENT)
Dept: PHYSICAL THERAPY | Age: 73
End: 2018-12-03
Payer: MEDICARE

## 2018-12-05 ENCOUNTER — HOSPITAL ENCOUNTER (OUTPATIENT)
Dept: PHYSICAL THERAPY | Age: 73
Discharge: HOME | End: 2018-12-05
Payer: MEDICARE

## 2018-12-05 PROCEDURE — 97016 VASOPNEUMATIC DEVICE THERAPY: CPT

## 2018-12-05 PROCEDURE — 97110 THERAPEUTIC EXERCISES: CPT

## 2018-12-05 NOTE — PROGRESS NOTES
PT DAILY TREATMENT NOTE 10-18 Patient Name: Laron Kuhn. Date:2018 : 1945 [x]  Patient  Verified Payor: [place] MEDICARE / Plan: Tyler Tirado / Product Type: Medicare / In time: 10:00  Out time: 10:45 Total Treatment Time (min): 45 Visit #: 98 IG 23 Medicare/BCBS Only Total Timed Codes (min):  30 1:1 Treatment Time:  30 Treatment Area: Pain in right shoulder [M25.511] SUBJECTIVE Pain Level (0-10 scale): 4/10 Any medication changes, allergies to medications, adverse drug reactions, diagnosis change, or new procedure performed?: [x] No    [] Yes (see summary sheet for update) Subjective functional status/changes:   [] No changes reported Pt. Reports he is doing pretty good. He reports always feeling more versatile after a shower OBJECTIVE Modality rationale: decrease edema, decrease inflammation and decrease pain to improve the patients ability to increase ease of Type Additional Details  
[] Estim:  []Unatt       []IFC  []Premod []Other:  []w/ice   []w/heat Position: Location:  
[] Estim: []Att    []TENS instruct  []NMES []Other:  []w/US   []w/ice   []w/heat Position: Location:  
[]  Traction: [] Cervical       []Lumbar 
                     [] Prone          []Supine []Intermittent   []Continuous Lbs: 
[] before manual 
[] after manual  
[]  Ultrasound: []Continuous   [] Pulsed []1MHz   []3MHz W/cm2: 
Location:  
[]  Iontophoresis with dexamethasone Location: [] Take home patch  
[] In clinic  
[]  Ice     []  heat 
[]  Ice massage 
[]  Laser  
[]  Anodyne Position: Location:  
[]  Laser with stim 
[]  Other:  Position: Location:  
[x]  Vasopneumatic Device: 15 min Pressure:       [] lo [x] med [] hi  
Temperature: [x] lo [] med [] hi  
[x] Skin assessment post-treatment:  [x]intact []redness- no adverse reaction 
  []redness  adverse reaction: 30 min Therapeutic Exercise:  [x] See flow sheet :  
Rationale: increase ROM and increase strength to improve the patients ability to increase ease of reaching With 
 [x] TE 
 [] TA 
 [] neuro 
 [] other: Patient Education: [x] Review HEP [] Progressed/Changed HEP based on:  
[] positioning   [] body mechanics   [] transfers   [] heat/ice application   
[] other:   
 
Other Objective/Functional Measures:  
Right shoulder flexion AAROM: 124 degrees abd: 91 degrees Pt. Continues to have pain into abduction AAROM Pain Level (0-10 scale) post treatment: 0/10 ASSESSMENT/Changes in Function:  Pt. Is progressing slowly towards goals. He demonstrates improving shoulder mobility especially in flexion range but continues to have significant limitations in abduction, IR, and ER mobility. Patient will continue to benefit from skilled PT services to modify and progress therapeutic interventions, address functional mobility deficits, address ROM deficits, address strength deficits, analyze and address soft tissue restrictions, analyze and cue movement patterns, analyze and modify body mechanics/ergonomics and assess and modify postural abnormalities to attain remaining goals. Progress towards goals / Updated goals: 1. Patient will improve FOTO score by 47 points in order to demonstrate a significant improvement in function.  
2. Patient will be Ind with donning/doffing clothes in order to increase independence at home.  
3. Patient will perform supine AAROM flexion to 135 degrees and abduction to 90 degrees in order to increase ease of reaching at home. Progressing: flex AAROM flex: 124 degrees abd: 91 degrees (12/5/18) PLAN 
[]  Upgrade activities as tolerated     [x]  Continue plan of care 
[]  Update interventions per flow sheet      
[]  Discharge due to:_ 
[]  Other:_ Joey Tinoco, PT 12/5/2018  9:23 AM 
 
Future Appointments Date Time Provider Denisse Swan 12/5/2018 10:00 AM Manuela Andrade, PT [facility]

## 2018-12-06 ENCOUNTER — APPOINTMENT (OUTPATIENT)
Dept: PHYSICAL THERAPY | Age: 73
End: 2018-12-06
Payer: MEDICARE

## 2018-12-07 ENCOUNTER — HOSPITAL ENCOUNTER (OUTPATIENT)
Dept: PHYSICAL THERAPY | Age: 73
Discharge: HOME | End: 2018-12-07
Payer: MEDICARE

## 2018-12-07 PROCEDURE — 97110 THERAPEUTIC EXERCISES: CPT

## 2018-12-07 PROCEDURE — 97016 VASOPNEUMATIC DEVICE THERAPY: CPT

## 2018-12-07 NOTE — PROGRESS NOTES
PT DAILY TREATMENT NOTE 10-18 Patient Name: Daryle Rile. Date:2018 : 1945 [x]  Patient  Verified Payor: [place] MEDICARE / Plan: Tyler Tirado / Product Type: Medicare / In time: 10:31  Out time: 11:15 Total Treatment Time (min): 44 Visit #: 18 JZ 33 Medicare/BCBS Only Total Timed Codes (min):  29 1:1 Treatment Time:  23 Treatment Area: Pain in right shoulder [M25.511] SUBJECTIVE Pain Level (0-10 scale): 4/10 Any medication changes, allergies to medications, adverse drug reactions, diagnosis change, or new procedure performed?: [x] No    [] Yes (see summary sheet for update) Subjective functional status/changes:   [] No changes reported Pt. Reports he is doing pretty good. He reports always feeling more versatile after a shower OBJECTIVE Modality rationale: decrease edema, decrease inflammation and decrease pain to improve the patients ability to increase ease of Type Additional Details  
[] Estim:  []Unatt       []IFC  []Premod []Other:  []w/ice   []w/heat Position: Location:  
[] Estim: []Att    []TENS instruct  []NMES []Other:  []w/US   []w/ice   []w/heat Position: Location:  
[]  Traction: [] Cervical       []Lumbar 
                     [] Prone          []Supine []Intermittent   []Continuous Lbs: 
[] before manual 
[] after manual  
[]  Ultrasound: []Continuous   [] Pulsed []1MHz   []3MHz W/cm2: 
Location:  
[]  Iontophoresis with dexamethasone Location: [] Take home patch  
[] In clinic  
[]  Ice     []  heat 
[]  Ice massage 
[]  Laser  
[]  Anodyne Position: Location:  
[]  Laser with stim 
[]  Other:  Position: Location:  
[x]  Vasopneumatic Device: 15 min Pressure:       [] lo [x] med [] hi  
Temperature: [x] lo [] med [] hi  
[x] Skin assessment post-treatment:  [x]intact []redness- no adverse reaction 
  []redness  adverse reaction: 29 min Therapeutic Exercise:  [x] See flow sheet :  
Rationale: increase ROM and increase strength to improve the patients ability to increase ease of reaching With 
 [x] TE 
 [] TA 
 [] neuro 
 [] other: Patient Education: [x] Review HEP [] Progressed/Changed HEP based on:  
[] positioning   [] body mechanics   [] transfers   [] heat/ice application   
[] other:   
 
Other Objective/Functional Measures:  
Pt. Tolerated PT well with no reports of increased pain He continues to have difficulty with reaching behind his back Pain Level (0-10 scale) post treatment: 0/10 ASSESSMENT/Changes in Function:  Pt. Is progressing slowly towards goals. He demonstrates improving shoulder mobility especially in flexion range but continues to have significant limitations in abduction, IR, and ER mobility. Patient will continue to benefit from skilled PT services to modify and progress therapeutic interventions, address functional mobility deficits, address ROM deficits, address strength deficits, analyze and address soft tissue restrictions, analyze and cue movement patterns, analyze and modify body mechanics/ergonomics and assess and modify postural abnormalities to attain remaining goals. Progress towards goals / Updated goals: 1. Patient will improve FOTO score by 47 points in order to demonstrate a significant improvement in function.  
2. Patient will be Ind with donning/doffing clothes in order to increase independence at home.  
3. Patient will perform supine AAROM flexion to 135 degrees and abduction to 90 degrees in order to increase ease of reaching at home. Progressing: flex AAROM flex: 124 degrees abd: 91 degrees (12/5/18) PLAN 
[]  Upgrade activities as tolerated     [x]  Continue plan of care 
[]  Update interventions per flow sheet      
[]  Discharge due to:_ 
[]  Other:_ Dk Dumas, PT 12/7/2018  9:23 AM 
 
No future appointments.

## 2018-12-10 ENCOUNTER — HOSPITAL ENCOUNTER (OUTPATIENT)
Dept: PHYSICAL THERAPY | Age: 73
Discharge: HOME | End: 2018-12-10
Payer: MEDICARE

## 2018-12-10 PROCEDURE — G8984 CARRY CURRENT STATUS: HCPCS

## 2018-12-10 PROCEDURE — G8985 CARRY GOAL STATUS: HCPCS

## 2018-12-10 PROCEDURE — 97110 THERAPEUTIC EXERCISES: CPT

## 2018-12-10 NOTE — PROGRESS NOTES
PT DAILY TREATMENT NOTE 10-18 Patient Name: Albertina Hunter. Date:12/10/2018 : 1945 [x]  Patient  Verified Payor: [employer] MEDICARE / Plan: Tyler Tirado / Product Type: Medicare / In time: 9:38  Out time: 10:30 Total Treatment Time (min): 52 Visit #: 00 SN 59 Medicare/BCBS Only Total Timed Codes (min):  42 1:1 Treatment Time:  42 Treatment Area: Pain in right shoulder [M25.511] SUBJECTIVE Pain Level (0-10 scale):  1-2/10 Any medication changes, allergies to medications, adverse drug reactions, diagnosis change, or new procedure performed?: [x] No    [] Yes (see summary sheet for update) Subjective functional status/changes:   [] No changes reported Pt. Reports he is doing pretty good but continues to have difficulty reaching behind his back. OBJECTIVE Modality rationale: decrease pain to improve the patients ability to increase ease of ADLs Type Additional Details  
[] Estim:  []Unatt       []IFC  []Premod []Other:  []w/ice   []w/heat Position: Location:  
[] Estim: []Att    []TENS instruct  []NMES []Other:  []w/US   []w/ice   []w/heat Position: Location:  
[]  Traction: [] Cervical       []Lumbar 
                     [] Prone          []Supine []Intermittent   []Continuous Lbs: 
[] before manual 
[] after manual  
[]  Ultrasound: []Continuous   [] Pulsed []1MHz   []3MHz W/cm2: 
Location:  
[]  Iontophoresis with dexamethasone Location: [] Take home patch  
[] In clinic [x]  Ice 10 min    []  heat 
[]  Ice massage 
[]  Laser  
[]  Anodyne Position: seated Location: right shoulder   
[]  Laser with stim 
[]  Other:  Position: Location:  
[]  Vasopneumatic Device Pressure:       [] lo [] med [] hi  
Temperature: [] lo [] med [] hi  
[x] Skin assessment post-treatment:  [x]intact []redness- no adverse reaction 
  []redness  adverse reaction: 42 min Therapeutic Exercise:  [x] See flow sheet :  
Rationale: increase ROM and increase strength to improve the patients ability to increase ease of ADLs With 
 [x] TE 
 [] TA 
 [] neuro 
 [] other: Patient Education: [x] Review HEP [] Progressed/Changed HEP based on:  
[] positioning   [] body mechanics   [] transfers   [] heat/ice application   
[] other:   
 
Other Objective/Functional Measures: FOTO: 60 points Supine AAROM flex: 130 degrees abd: 90 degrees ER at side: 10 degrees AROM in standing flex: 100 degrees abd with short lever arm: 77 degrees Increasing pain with UBE today so only performed for 3 minutes Pain Level (0-10 scale) post treatment: 1-2/10 ASSESSMENT/Changes in Function:  
 
  
[]  See Plan of Care [x]  See progress note/recertification 
[]  See Discharge Summary Progress towards goals / Updated goals: 
See re-cert note PLAN 
[]  Upgrade activities as tolerated     [x]  Continue plan of care 
[]  Update interventions per flow sheet      
[]  Discharge due to:_ 
[]  Other:_ Mia Rose PT 12/10/2018  9:41 AM 
 
Future Appointments Date Time Provider Denisse Swan 12/10/2018 10:00 AM Stephanie Garcia, PT [facility]  
12/13/2018  4:00 PM Stephanie Garcia PT [facility]  
12/14/2018  3:30 PM Stephanie Garcia PT [facility]

## 2018-12-10 NOTE — PROGRESS NOTES
[facility] [address] [facility] 
[phone] fax Continued Plan of Care/ Re-certification for Physical Therapy Services Patient name: Lamar Estrella Start of Care:  10/17/18 Referral source: Christel Chin, * : 1945 Medical/Treatment Diagnosis: Pain in right shoulder [M25.511] Payor: Eneida Mejia / Plan: [place] MED[place]ARE PART A & B / Product Type: Medicare /  Onset Date: 18 Prior Hospitalization: see medical history Provider#: [id number] Medications: Verified on Patient Summary List   
Comorbidities:  HTN, hearing impaired Prior Level of Function: Ind with ADLs, right hand dominant, working, golf Visits from Start of Care: 18    Missed Visits: 0 The Plan of Care and following information is based on the patient's current status: 
Goal: Patient will improve FOTO score by 47 points in order to demonstrate a significant improvement in function.  
Status at last note/certification: 45 
Current Status: met Goal: Patient will be Ind with donning/doffing clothes in order to increase independence at home. Status at last note/certification: required assistance Current Status: met Goal: Patient will perform supine AAROM flexion to 135 degrees and abduction to 90 degrees in order to increase ease of reaching at home. Status at last note/certification: flex: 116 fdegrees Current Status: not met Key functional changes: pt. Has increased ease of eating and getting dressed. He demonstrates improving right shoulder mobility. Problems/ barriers to goal attainment: none Problem List: pain affecting function, decrease ROM, decrease strength, edema affecting function, decrease ADL/ functional abilitiies, decrease activity tolerance, decrease flexibility/ joint mobility and decrease transfer abilities Treatment Plan: Therapeutic exercise, Therapeutic activities, Neuromuscular re-education, Physical agent/modality, Manual therapy, Patient education, Self Care training, Functional mobility training and Home safety training Patient Goal (s) has been updated and includes: to move arm better Goals for this certification period to be accomplished in 4 weeks: 1. Patient will improve right shoulder AROM flex to 120 degrees and abd to 90 degrees in order to increase ease of reaching into cabinets at home. 2. Patient will improve behind back reaching to L4 in order to increase ease of putting on his belt 3. Patient will improve right shoulder strength to 4/5 in order to increase ease of ADLs Frequency / Duration: Patient to be seen 2-3 times per week for 4 weeks: 
Assessment / Recommendations: pt. Is progressing slowly with physical therapy. He demonstrates improving right shoulder mobility but continues to have significant limitations in motion. He has increased ease of donning clothes and eating. He continues to have difficulty putting on Deoderant and his belt. Supine AAROM flex: 130 degrees abd: 90 degrees ER at side: 10 degrees. AROM in standing flex: 100 degrees abd with short lever arm: 77 degrees. Skilled PT is medically necessary in order to improve right shoulder mobility and strength for increased ease of ADLs and return to PLOF. G-Codes (GP) Carry  Current  CK= 40-59%  Goal  CK= 40-59% The severity rating is based on clinical judgment and the FOTO score. Certification Period: 12/10/18-1/9/19 Kati Mcgowan, PT 12/10/2018 9:51 AM 
 
________________________________________________________________________ I certify that the above Therapy Services are being furnished while the patient is under my care. I agree with the treatment plan and certify that this therapy is necessary. [] I have read the above and request that my patient continue as recommended.  
[] I have read the above report and request that my patient continue therapy with the following changes/special instructions: _______________________________________ [] I have read the above report and request that my patient be discharged from therapy [de-identified] Signature:____________Date:_________TIME:________ 
 
[facility], Date and Time must be completed for valid certification ** Please sign and return to [address] [facility] 
[phone] fax

## 2018-12-13 ENCOUNTER — HOSPITAL ENCOUNTER (OUTPATIENT)
Dept: PHYSICAL THERAPY | Age: 73
Discharge: HOME | End: 2018-12-13
Payer: MEDICARE

## 2018-12-13 PROCEDURE — 97110 THERAPEUTIC EXERCISES: CPT

## 2018-12-13 NOTE — PROGRESS NOTES
PT DAILY TREATMENT NOTE 10-18    Patient Name: Sabrina Devlin. Date:2018  : 1945  [x]  Patient  Verified  Payor: Jann Cao / Plan: [place] MEDICARE PART A & B / Product Type: Medicare /    In time: 3:50  Out time: 4:40  Total Treatment Time (min): 50  Visit #: 19 of 24    Medicare/BCBS Only   Total Timed Codes (min):  40 1:1 Treatment Time:  40       Treatment Area: Pain in right shoulder [M25.511]    SUBJECTIVE  Pain Level (0-10 scale): 3-4/10  Any medication changes, allergies to medications, adverse drug reactions, diagnosis change, or new procedure performed?: [x] No    [] Yes (see summary sheet for update)  Subjective functional status/changes:   [] No changes reported  Pt. Reports he is feeling about the same. He continues to have difficulty drying his back with a towel at home.      OBJECTIVE    Modality rationale: decrease pain to improve the patients ability to increase ease of ADLs   Type Additional Details   [] Estim:  []Unatt       []IFC  []Premod                        []Other:  []w/ice   []w/heat  Position:  Location:   [] Estim: []Att    []TENS instruct  []NMES                    []Other:  []w/US   []w/ice   []w/heat  Position:  Location:   []  Traction: [] Cervical       []Lumbar                       [] Prone          []Supine                       []Intermittent   []Continuous Lbs:  [] before manual  [] after manual   []  Ultrasound: []Continuous   [] Pulsed                           []1MHz   []3MHz W/cm2:  Location:   []  Iontophoresis with dexamethasone         Location: [] Take home patch   [] In clinic   [x]  Ice  10 min    []  heat  []  Ice massage  []  Laser   []  Anodyne Position: seated  Location: right shoulder   []  Laser with stim  []  Other:  Position:  Location:   []  Vasopneumatic Device Pressure:       [] lo [] med [] hi   Temperature: [] lo [] med [] hi   [x] Skin assessment post-treatment:  [x]intact []redness- no adverse reaction    []redness  adverse reaction: 40 min Therapeutic Exercise:  [x] See flow sheet :   Rationale: increase ROM and increase strength to improve the patients ability to increase ease of ADLs          With   [x] TE   [] TA   [] neuro   [] other: Patient Education: [x] Review HEP    [] Progressed/Changed HEP based on:   [] positioning   [] body mechanics   [] transfers   [] heat/ice application    [] other:      Other Objective/Functional Measures:   Attempted to perform abduction on wall but it was too painful so held this today. He was challenged with stick AAROM in standing  Attempted side lying ER but was painful so this was held today    Pain Level (0-10 scale) post treatment: 3/10    ASSESSMENT/Changes in Function:  Pt. Is making limited progress towards goals. He continues to have decreased shoulder mobility especially in ER and abduction mobility. He continues to demonstrate decreased shoulder strength. Patient will continue to benefit from skilled PT services to modify and progress therapeutic interventions, address functional mobility deficits, address ROM deficits, address strength deficits, analyze and address soft tissue restrictions, analyze and cue movement patterns and analyze and modify body mechanics/ergonomics to attain remaining goals. Progress towards goals / Updated goals:  1. Patient will improve right shoulder AROM flex to 120 degrees and abd to 90 degrees in order to increase ease of reaching into cabinets at home. 2. Patient will improve behind back reaching to L4 in order to increase ease of putting on his belt  3.  Patient will improve right shoulder strength to 4/5 in order to increase ease of ADLs        PLAN  []  Upgrade activities as tolerated     [x]  Continue plan of care  []  Update interventions per flow sheet       []  Discharge due to:_  []  Other:_      Armando Espinoza, PT 12/13/2018  3:56 PM    Future Appointments   Date Time Provider Denisse Swan   12/13/2018  4:00 PM Ginette Alessio Gutierrez [facility]   12/14/2018  3:30 [facility]

## 2018-12-14 ENCOUNTER — HOSPITAL ENCOUNTER (OUTPATIENT)
Dept: PHYSICAL THERAPY | Age: 73
Discharge: HOME | End: 2018-12-14
Payer: MEDICARE

## 2018-12-14 PROCEDURE — 97110 THERAPEUTIC EXERCISES: CPT

## 2018-12-14 NOTE — PROGRESS NOTES
PT DAILY TREATMENT NOTE 10-18    Patient Name: Sapphire Heard. Date:2018  : 1945  [x]  Patient  Verified  Payor: Nilo Army / Plan: [place] MEDICARE PART A & B / Product Type: Medicare /    In time: 3:35  Out time: 4:10  Total Treatment Time (min): 35  Visit #: 20 of 24    Medicare/BCBS Only   Total Timed Codes (min):  25 1:1 Treatment Time:  25       Treatment Area: Pain in right shoulder [M25.511]    SUBJECTIVE  Pain Level (0-10 scale): 1-2/10  Any medication changes, allergies to medications, adverse drug reactions, diagnosis change, or new procedure performed?: [x] No    [] Yes (see summary sheet for update)  Subjective functional status/changes:   [] No changes reported  Pt. Reports he noticed his mobility at home is getting better.      OBJECTIVE    Modality rationale: decrease pain to improve the patients ability to increase ease of ADLs   Min Type Additional Details    [] Estim:  []Unatt       []IFC  []Premod                        []Other:  []w/ice   []w/heat  Position:  Location:    [] Estim: []Att    []TENS instruct  []NMES                    []Other:  []w/US   []w/ice   []w/heat  Position:  Location:    []  Traction: [] Cervical       []Lumbar                       [] Prone          []Supine                       []Intermittent   []Continuous Lbs:  [] before manual  [] after manual    []  Ultrasound: []Continuous   [] Pulsed                           []1MHz   []3MHz W/cm2:  Location:    []  Iontophoresis with dexamethasone         Location: [] Take home patch   [] In clinic   10 [x]  Ice     []  heat  []  Ice massage  []  Laser   []  Anodyne Position: seated  Location: right shoulder    []  Laser with stim  []  Other:  Position:  Location:    []  Vasopneumatic Device Pressure:       [] lo [] med [] hi   Temperature: [] lo [] med [] hi   [x] Skin assessment post-treatment:  [x]intact []redness- no adverse reaction    []redness  adverse reaction:     25 min Therapeutic Exercise:  [x] See flow sheet :   Rationale: increase ROM and increase strength to improve the patients ability to increase ease of ADLs          With   [x] TE   [] TA   [] neuro   [] other: Patient Education: [x] Review HEP    [] Progressed/Changed HEP based on:   [] positioning   [] body mechanics   [] transfers   [] heat/ice application    [] other:      Other Objective/Functional Measures:   Right shoulder AROM in standin degrees  Pt. Continues to have pain with abduction and ER at end ranges  Pt. Continues to have difficulty reaching behind his back    Pain Level (0-10 scale) post treatment: 1/10    ASSESSMENT/Changes in Function:  Pt. Is progressing with physical therapy. He is having increased ease of daily activities and reaching at home. He continues to have difficulty with reaching to side. Patient will continue to benefit from skilled PT services to modify and progress therapeutic interventions, address functional mobility deficits, address ROM deficits, address strength deficits, analyze and address soft tissue restrictions, analyze and cue movement patterns, analyze and modify body mechanics/ergonomics and assess and modify postural abnormalities to attain remaining goals. Progress towards goals / Updated goals:  . Patient will improve right shoulder AROM flex to 120 degrees and abd to 90 degrees in order to increase ease of reaching into cabinets at home. Progressing: flexion: 112 degrees (18)  2. Patient will improve behind back reaching to L4 in order to increase ease of putting on his belt  3. Patient will improve right shoulder strength to 4/5 in order to increase ease of ADLs        PLAN  []  Upgrade activities as tolerated     [x]  Continue plan of care  []  Update interventions per flow sheet       []  Discharge due to:_  []  Other:_      Dk Dumas, PT 2018  3:39 PM    No future appointments.

## 2018-12-19 ENCOUNTER — HOSPITAL ENCOUNTER (OUTPATIENT)
Dept: PHYSICAL THERAPY | Age: 73
Discharge: HOME | End: 2018-12-19
Payer: MEDICARE

## 2018-12-19 PROCEDURE — 97110 THERAPEUTIC EXERCISES: CPT

## 2018-12-19 NOTE — PROGRESS NOTES
PT DAILY TREATMENT NOTE 10-18    Patient Name: Harish Faith. Date:2018  : 1945  [x]  Patient  Verified  Payor: VA MEDICARE / Plan: [place] MEDICARE PART A & B / Product Type: Medicare /    In time: 11:30  Out time: 12:15  Total Treatment Time (min): 45  Visit #: 21 of 24    Medicare/BCBS Only   Total Timed Codes (min):  35 1:1 Treatment Time:  35       Treatment Area: Pain in right shoulder [M25.511]    SUBJECTIVE  Pain Level (0-10 scale):  3-4/10  Any medication changes, allergies to medications, adverse drug reactions, diagnosis change, or new procedure performed?: [x] No    [] Yes (see summary sheet for update)  Subjective functional status/changes:   [] No changes reported  Pt.  Reports he is feeling about the same today    OBJECTIVE    Modality rationale: decrease pain to improve the patients ability to increase ease of ADLs   Min Type Additional Details    [] Estim:  []Unatt       []IFC  []Premod                        []Other:  []w/ice   []w/heat  Position:  Location:    [] Estim: []Att    []TENS instruct  []NMES                    []Other:  []w/US   []w/ice   []w/heat  Position:  Location:    []  Traction: [] Cervical       []Lumbar                       [] Prone          []Supine                       []Intermittent   []Continuous Lbs:  [] before manual  [] after manual    []  Ultrasound: []Continuous   [] Pulsed                           []1MHz   []3MHz W/cm2:  Location:    []  Iontophoresis with dexamethasone         Location: [] Take home patch   [] In clinic   10- [x]  Ice     []  heat  []  Ice massage  []  Laser   []  Anodyne Position: seated  Location: right shoulder    []  Laser with stim  []  Other:  Position:  Location:    []  Vasopneumatic Device Pressure:       [] lo [] med [] hi   Temperature: [] lo [] med [] hi   [x] Skin assessment post-treatment:  [x]intact []redness- no adverse reaction    []redness  adverse reaction:     35 min Therapeutic Exercise:  [x] See flow sheet : Rationale: increase ROM and increase strength to improve the patients ability to increase ease of reaching           With   [x] TE   [] TA   [] neuro   [] other: Patient Education: [x] Review HEP    [] Progressed/Changed HEP based on:   [] positioning   [] body mechanics   [] transfers   [] heat/ice application    [] other:      Other Objective/Functional Measures:   Pt. Tolerated PT well with no reports of increased pain following PT  He tolerated abduction with long lever arm today  He did have pain in upper arm during ball on wall stabs side to side motion    Pain Level (0-10 scale) post treatment: 1/10    ASSESSMENT/Changes in Function:  Pt. Is progressing slowly towards goals. He continues to have decreased right shoulder mobility and strength. He continues to have difficulty with putting on a belt. Patient will continue to benefit from skilled PT services to modify and progress therapeutic interventions, address functional mobility deficits, address ROM deficits, address strength deficits, analyze and address soft tissue restrictions, analyze and cue movement patterns, analyze and modify body mechanics/ergonomics and assess and modify postural abnormalities to attain remaining goals. Progress towards goals / Updated goals:  1. Patient will improve right shoulder AROM flex to 120 degrees and abd to 90 degrees in order to increase ease of reaching into cabinets at home. Progressing: flexion: 112 degrees (12/14/18)  2. Patient will improve behind back reaching to L4 in order to increase ease of putting on his belt  3.  Patient will improve right shoulder strength to 4/5 in order to increase ease of ADLs        PLAN  []  Upgrade activities as tolerated     [x]  Continue plan of care  []  Update interventions per flow sheet       []  Discharge due to:_  []  Other:_      Dk Dumas PT 12/19/2018  11:26 AM    Future Appointments   Date Time Provider Denisse Swan   12/19/2018 11:30 AM AYAKA Gardiner [facility]   12/20/2018  4:00 PM AYAKA Gardiner [facility]

## 2018-12-20 ENCOUNTER — HOSPITAL ENCOUNTER (OUTPATIENT)
Dept: PHYSICAL THERAPY | Age: 73
Discharge: HOME | End: 2018-12-20
Payer: MEDICARE

## 2018-12-20 PROCEDURE — 97110 THERAPEUTIC EXERCISES: CPT

## 2018-12-20 NOTE — PROGRESS NOTES
PT DAILY TREATMENT NOTE 10-18    Patient Name: Minnie Hope. Date:2018  : 1945  [x]  Patient  Verified  Payor: VA MEDICARE / Plan: VA MEDICARE PART A & B / Product Type: Medicare /    In time: 4:00  Out time: 4:42  Total Treatment Time (min): 42  Visit #: 22 of 24    Medicare/BCBS Only   Total Timed Codes (min):  32 1:1 Treatment Time:  23       Treatment Area: Pain in right shoulder [M25.511]    SUBJECTIVE  Pain Level (0-10 scale): 1/10  Any medication changes, allergies to medications, adverse drug reactions, diagnosis change, or new procedure performed?: [x] No    [] Yes (see summary sheet for update)  Subjective functional status/changes:   [] No changes reported  Pt. Reports he is doing pretty good but continues to have pain in his biceps.      OBJECTIVE    Modality rationale: decrease pain to improve the patients ability to increase ease of ADLs   Min Type Additional Details    [] Estim:  []Unatt       []IFC  []Premod                        []Other:  []w/ice   []w/heat  Position:  Location:    [] Estim: []Att    []TENS instruct  []NMES                    []Other:  []w/US   []w/ice   []w/heat  Position:  Location:    []  Traction: [] Cervical       []Lumbar                       [] Prone          []Supine                       []Intermittent   []Continuous Lbs:  [] before manual  [] after manual    []  Ultrasound: []Continuous   [] Pulsed                           []1MHz   []3MHz W/cm2:  Location:    []  Iontophoresis with dexamethasone         Location: [] Take home patch   [] In clinic   10 [x]  Ice     []  heat  []  Ice massage  []  Laser   []  Anodyne Position: seated  Location: right shoulder    []  Laser with stim  []  Other:  Position:  Location:    []  Vasopneumatic Device Pressure:       [] lo [] med [] hi   Temperature: [] lo [] med [] hi   [x] Skin assessment post-treatment:  [x]intact []redness- no adverse reaction    []redness  adverse reaction:     32 min Therapeutic Exercise:  [x] See flow sheet :   Rationale: increase ROM and increase strength to improve the patients ability to increase ease of ADLs          With   [x] TE   [] TA   [] neuro   [] other: Patient Education: [x] Review HEP    [] Progressed/Changed HEP based on:   [] positioning   [] body mechanics   [] transfers   [] heat/ice application    [] other:      Other Objective/Functional Measures:   Pt. Tolerated 2 oz resistance during shoulder AROM well with no reports of increased pain  He continues to be challenged with going to right during wall wipes and with ball on wall stabs  He continues to have decreased ER mobility. Pain Level (0-10 scale) post treatment: 1/10    ASSESSMENT/Changes in Function:  Pt. Is progressing slowly towards goals. He continues to demonstrate improving right shoulder strength but he continues to have decreased abduciton and ER mobility. Patient will continue to benefit from skilled PT services to modify and progress therapeutic interventions, address functional mobility deficits, address ROM deficits, address strength deficits, analyze and address soft tissue restrictions, analyze and cue movement patterns and analyze and modify body mechanics/ergonomics to attain remaining goals. Progress towards goals / Updated goals:  1. Patient will improve right shoulder AROM flex to 120 degrees and abd to 90 degrees in order to increase ease of reaching into cabinets at home.   Progressing: flexion: 112 degrees (12/14/18)  2. Patient will improve behind back reaching to L4 in order to increase ease of putting on his belt  3. Patient will improve right shoulder strength to 4/5 in order to increase ease of ADLs        PLAN  []  Upgrade activities as tolerated     [x]  Continue plan of care  []  Update interventions per flow sheet       []  Discharge due to:_  []  Other:_      Tiana Maradiaga, PT 12/20/2018  4:01 PM    No future appointments.

## 2018-12-31 ENCOUNTER — HOSPITAL ENCOUNTER (OUTPATIENT)
Dept: PHYSICAL THERAPY | Age: 73
Discharge: HOME | End: 2018-12-31
Payer: MEDICARE

## 2018-12-31 PROCEDURE — 97110 THERAPEUTIC EXERCISES: CPT

## 2018-12-31 PROCEDURE — G8979 MOBILITY GOAL STATUS: HCPCS

## 2018-12-31 PROCEDURE — G8978 MOBILITY CURRENT STATUS: HCPCS

## 2018-12-31 PROCEDURE — 97161 PT EVAL LOW COMPLEX 20 MIN: CPT

## 2018-12-31 NOTE — PROGRESS NOTES
PT DAILY TREATMENT NOTE/VESTIBULAR EVAL 10-18 Patient Name: Arabella Roberts. Date:2018 : 1945 [x]  Patient  Verified Payor: VA MEDICARE / Plan: Tyler Tirado / Product Type: Medicare / In time: 9:08  Out time: 9:51 Total Treatment Time (min): 43 Visit #: 1 of 16 Medicare/BCBS Only Total Timed Codes (min):  10 1:1 Treatment Time:  37 Treatment Area: Pain in right shoulder [M25.511] SUBJECTIVE Pain Level (0-10 scale): 0/10 []constant []intermittent []improving []worsening []no change since onset Any medication changes, allergies to medications, adverse drug reactions, diagnosis change, or new procedure performed?: [x] No    [] Yes (see summary sheet for update) Subjective functional status/changes: Mechanism of Injury: dizziness started 10 years ago. Off and on symptoms. Was gone for a while and then came back. Symptoms increase with rolling in bed. Bending over to  golf ball will get dizzy when coming back up. Describes dizziness as light headed and some movement. Symptoms last less than a minute. Both turning left and right is the same. Reports is off balance while he is dizzy. Ringing in ears has been there for a long time. Living Situation: lives with wife Pt Goals: to have less dizziness OBJECTIVE/EXAMINATION 
 
10 min Therapeutic Exercise:  [x] See flow sheet :  
Rationale: increase ROM and increase strength to improve the patients ability to increase ease of ADLs With 
 [x] TE 
 [] TA 
 [] neuro 
 [] other: Patient Education: [x] Review HEP [] Progressed/Changed HEP based on:  
[] positioning   [] body mechanics   [] transfers   [] heat/ice application   
[] other:   
 
Physical Therapy Evaluation - Vestibular Posture:  [] WNL [x] Forward head    [x] Protracted shoulders    [] Retracted shoulders 
[] Kyphosis:  [] increased   [] decreased  
[] Lordosis:   [] increased   [] decreased Other: C/S ROM: [] WFL    [x] Limited    Describe: 
 
Optional Tests: 
Oculomotor Tests: (Fixation Not Blocked) Ocular ROM:   [] WFL    [] Limited    Describe: 
     Spontaneous Nystag. [x] Neg     [] Pos    [] Left    [] Right Gaze Holding Nystag. [x] Neg     [] Pos    [] Left    [] Right Smooth Pursuit  [x] Neg     [] Pos    [] Left    [] Right Saccades   [x] Neg     [] Pos    [] Left    [] Right VOR - Slow Head Mvmt [x] Neg     [] Pos    [] Left    [] Right VOR - Fast Head Mvmt [x] Neg     [] Pos    [] Left    [] Right Head Thrust  [x] Neg     [] Pos    [] Left    [] Right Static Visual Acuity [] Neg     [] Pos    [] Left    [] Right Dynamic Visual Acuity [] Neg     [] Pos    [] Left    [] Right Negative convergence testing Other Special Tests: 
     Vertebral Artery Testing [] Neg     [] Pos    [] Left    [] Right Hallpike-Bolton Maneuver [x] Neg     [] Pos    [x] Left    [x] Right dizziness with sitting up from both directions with more severity on right Roll Test   [x] Neg     [] Pos    [x] Left    [x] Right dizziness to the right with no nystagmus noted Vásquez Balance Scale [] Neg     [] Pos    Score: 
     Dynamic Gait Index [] Neg     [] Pos    Score: 
     Functional Gait Assess. [] Neg     [] Pos    Score: 
 
Balance Standard Testing (Eyes Open/Eyes Closed - EO/EC) Romberg   [x] [facility]    [] Pos    Describe:     
     Stand on Foam  [] WFL    [x] Pos    Describe: Mild decrease in balance with eyes closed Standing on Rail  [] [facility]    [] Pos    Describe:  
     Sharpened Romberg [] WFL    [x] Pos    Describe: Unable to perform with eyes open Single Leg Stand  [] [facility]    [] Pos    Describe: Motion Sensitivity:  [] Neg     [] Pos    Describe: 
 
Pain Level (0-10 scale) post treatment:  0/10 ASSESSMENT/Changes in Function:  
 
[x]  See Plan of Care 
[]  See progress note/recertification 
[]  See Discharge Summary Progress towards goals / Updated goals: 
See POC PLAN 
[]  Upgrade activities as tolerated     [x]  Continue plan of care 
[]  Update interventions per flow sheet      
[]  Discharge due to:_ 
[]  Other:_ Floresita Freitas PT 12/31/2018  9:09 AM

## 2018-12-31 NOTE — PROGRESS NOTES
[facility] [address] [facility] 
[phone] fax Plan of Care/ Statement of Necessity for Physical Therapy Services Patient name: Kenia Parish Start of Care: 2018 Referral source: Zakiya Bundy MD : 1945 Medical Diagnosis: Pain in right shoulder [M25.511] Payor: [place] MEDICARE / Plan: [address] [place] [address] / Product Type: Medicare /  Onset Date: on and off for 10 years Treatment Diagnosis: dizziness Prior Hospitalization: see medical history Provider#: [id number] Medications: Verified on Patient summary List  
 Comorbidities: right TSA, HTN, hearing impaired Prior Level of Function: Ind with ADLs, golfing The Plan of Care and following information is based on the information from the initial evaluation. Assessment/ key information:  Pt is a 75year old male c/o dizziness symptoms. He reports symptoms have been on and off for 10 years but most recent episode began 1-2 months ago. He has increased symptoms with rolling in bed, bending fwd, and getting up after laying down. He reports symptoms usually last less than a minute. He describes symptoms as feeling light headed with some movement. He reports his balance is off while he is dizzy. He presents with negative smooth pursuits, saccades, VOR testing, and head thrust. He has good balance with Rhomberg eyes closed but was unable to hold tandem stance. FGA score was 25/30. He had negative Mentor-Hallpike and roll tests with no nystagmus noted, however he does having dizziness symptoms reproduced when sitting back up. Skilled PT is medically necessary in order to improve dizziness symptoms and balance for increased ease of ADLs and improved quality of life.   
 
Evaluation Complexity History MEDIUM  Complexity : 1-2 comorbidities / personal factors will impact the outcome/ POC ; Examination MEDIUM Complexity : 3 Standardized tests and measures addressing body structure, function, activity limitation and / or participation in recreation  ;Presentation MEDIUM Complexity : Evolving with changing characteristics  ; Clinical Decision Making LOW Complexity : FOTO score of  Overall Complexity Rating: LOW Problem List: decrease ROM, decrease strength, edema affecting function, impaired gait/ balance, decrease ADL/ functional abilitiies, decrease activity tolerance, decrease flexibility/ joint mobility and decrease transfer abilities Treatment Plan may include any combination of the following: Therapeutic exercise, Therapeutic activities, Neuromuscular re-education, Physical agent/modality, Gait/balance training, Manual therapy, Patient education, Self Care training, Functional mobility training and Home safety training Patient / Family readiness to learn indicated by: asking questions Persons(s) to be included in education: patient (P) Barriers to Learning/Limitations: None Patient Goal (s): to have less dizziness Patient Self Reported Health Status: good Rehabilitation Potential: good Short Term Goals: To be accomplished in 1 weeks: 1. Patient will demonstrate compliance with HEP in order to improve dizziness symptoms Long Term Goals: To be accomplished in 8 weeks: 1. Patient will improve FOTO score by 4 points in order to demonstrate a significant improvement in function. 2. Patient will improve FGA score by 4 points in order to demonstrate a significant improvement in balance. 3. Patient will report a 75% improvement in dizziness symptoms since [facility] in order to improve quality of life. 4. Patient will perform SOT at [facility] for his age group in order to demonstrate improving balance. Frequency / Duration: Patient to be seen 2 times per week for 8 weeks.  
Patient/ Caregiver education and instruction: Diagnosis, prognosis, exercises 
 [x]  Plan of care has been reviewed with PTA 
 
 G-Codes (GP) Mobility  Current  CI= 1-19%   Goal  CI= 1-19% The severity rating is based on clinical judgment and the FOTO score. Certification Period: 12/31/18-2/28/19 Kiki Das, PT 12/31/2018 10:00 AM 
 
________________________________________________________________________ I certify that the above Therapy Services are being furnished while the patient is under my care. I agree with the treatment plan and certify that this therapy is necessary. [de-identified] Signature:____________Date:_________TIME:________ 
 
[employer], Date and Time must be completed for valid certification ** Please sign and return to [facility]  [address] [facility] 
[phone] fax

## 2019-01-04 ENCOUNTER — HOSPITAL ENCOUNTER (OUTPATIENT)
Dept: PHYSICAL THERAPY | Age: 74
Discharge: HOME | End: 2019-01-04
Payer: MEDICARE

## 2019-01-04 PROCEDURE — 97110 THERAPEUTIC EXERCISES: CPT

## 2019-01-04 PROCEDURE — 97530 THERAPEUTIC ACTIVITIES: CPT

## 2019-01-04 PROCEDURE — 97750 PHYSICAL PERFORMANCE TEST: CPT

## 2019-01-04 NOTE — PROGRESS NOTES
PT DAILY TREATMENT NOTE 10-18    Patient Name: Julian Tafoya. Date:2019  : 1945  [x]  Patient  Verified  Payor: VA MEDICARE / Plan: VA MEDICARE PART A & B / Product Type: Medicare /    In time:9:28  Out time:9:54  Total Treatment Time (min): 26  Visit #: 23 of 24    Medicare/BCBS Only   Total Timed Codes (min):  26 1:1 Treatment Time:  26       Treatment Area: Pain in right shoulder [M25.511]    SUBJECTIVE  Pain Level (0-10 scale): 2-3/10  Any medication changes, allergies to medications, adverse drug reactions, diagnosis change, or new procedure performed?: [x] No    [] Yes (see summary sheet for update)  Subjective functional status/changes:   [] No changes reported  Pt reports he still gets a sharp pain in the front of his upper arms at times. The pain is sharp at 5-6/10. It is only with certain movements and then it subsides quickly.   He needs to leave before 10am today because he has to get to a .      OBJECTIVE      26 min Therapeutic Exercise:  [x] See flow sheet :   Rationale: increase ROM and increase strength to improve the patients ability to improve ease of reaching with ADLs          With   [] TE   [] TA   [] neuro   [] other: Patient Education: [x] Review HEP    [] Progressed/Changed HEP based on:   [] positioning   [] body mechanics   [] transfers   [] heat/ice application    [x] other: advised pt to use ice 10-15 minutes over shoulder and bicep      Other Objective/Functional Measures:     Pt requested to perform a different intervention when performing supine AAROM with stick as he reports he is doing this at home    Sharp pain in biceps with UBE, ceased after 2 minutes d/t pain  Sharp pain in biceps with sidelying ER and triceps extension, subsided immediately upon release    No tenderness in right biceps     Pain with shoulder flexion/elbow flexion AROM  Held bicep curls d/t pain consistent with bicep involvement    No pain following therapy    Pt advised to perform exercises in pain-free range with HEP. Advised to use ice 10-15 minutes at a time over shoulder and bicep over the weekend     Pain Level (0-10 scale) post treatment: 0/10    ASSESSMENT/Changes in Function:     Pt is making slow progress in therapy. Tolerance with interventions this visit was limited, with findings of pain with shoulder flexion/elbow flexion and exercises that required stabilization in flexed position of elbow consistent with bicep involvement. Pt reports he continues to have bicep pain, but it is slowly improving overall. Held bicep curls this visit d/t pain with other interventions. Will continue to address strength and ROM deficits in order to increase functional use of right UE. Patient will continue to benefit from skilled PT services to modify and progress therapeutic interventions, address ROM deficits, address strength deficits, analyze and address soft tissue restrictions, analyze and cue movement patterns, assess and modify postural abnormalities and instruct in home and community integration to attain remaining goals. Progress towards goals / Updated goals:  1. Patient will improve right shoulder AROM flex to 120 degrees and abd to 90 degrees in order to increase ease of reaching into cabinets at home.   Progressing: flexion: 112 degrees (12/14/18)  2. Patient will improve behind back reaching to L4 in order to increase ease of putting on his belt  3.  Patient will improve right shoulder strength to 4/5 in order to increase ease of ADLs     PLAN  []  Upgrade activities as tolerated     [x]  Continue plan of care  []  Update interventions per flow sheet       []  Discharge due to:_  []  Other:_      Kentrell Montanez, PT 1/4/2019  11:45 AM    Future Appointments   Date Time Provider Denisse Swan   1/8/2019  2:30 PM Anayeli Naidu, PT [facility]   1/8/2019  3:00 PM Anayeli Naidu, PT [facility]   1/10/2019  9:00 AM Sahil Davis, PT [facility]   1/10/2019 9:30 AM Ria Daniels, PT [facility]

## 2019-01-04 NOTE — PROGRESS NOTES
PT DAILY TREATMENT NOTE 10-18    Patient Name: Paulina Poole. Date:2019  : 1945  [x]  Patient  Verified  Payor: VA MEDICARE / Plan: VA MEDICARE PART A & B / Product Type: Medicare /    In time:8:58  Out time:9:28  Total Treatment Time (min): 30  Visit #: 2 of 16    Medicare/BCBS Only   Total Timed Codes (min):  30 1:1 Treatment Time:  30       Treatment Area: Pain in right shoulder [M25.511]    SUBJECTIVE  Pain Level (0-10 scale): 0/10  Any medication changes, allergies to medications, adverse drug reactions, diagnosis change, or new procedure performed?: [x] No    [] Yes (see summary sheet for update)  Subjective functional status/changes:   [] No changes reported  Pt reports that he continues to have dizziness first thing in the morning that subsides quickly. He has this daily. He is doing his exercises at home. It is challenging for him to do the Inman-Daroff exercises on his bed as his bed is soft. OBJECTIVE    22 min Physical Performance Test: SOT       8 minutes Therapeutic Activity:  Patient education - review of SOT results in order to ensure pt understanding       With   [] TE   [] TA   [] neuro   [] other: Patient Education: [x] Review HEP    [] Progressed/Changed HEP based on:   [] positioning   [] body mechanics   [] transfers   [] heat/ice application    [] other:      Other Objective/Functional Measures:    SOT composite score 72  No LOB during SOT  Somatosensory, visual, and vestibular findings were above age-related norms  No dizziness with SOT     Pain Level (0-10 scale) post treatment: 0/10    ASSESSMENT/Changes in Function:     Pt performed SOT this visit. Findings with 3 systems of balance and composite score were above age-related norms. Will initiate interventions per POC next session.      Patient will continue to benefit from skilled PT services to modify and progress therapeutic interventions, address functional mobility deficits, address ROM deficits, address strength deficits, analyze and address soft tissue restrictions, analyze and cue movement patterns, assess and modify postural abnormalities, address imbalance/dizziness and instruct in home and community integration to attain remaining goals. Progress towards goals / Updated goals:    Short Term Goals: To be accomplished in 1 weeks:  1. Patient will demonstrate compliance with HEP in order to improve dizziness symptoms Goal met. 1/4/19     Long Term Goals: To be accomplished in 8 weeks:  1. Patient will improve FOTO score by 4 points in order to demonstrate a significant improvement in function. 2. Patient will improve FGA score by 4 points in order to demonstrate a significant improvement in balance. 3. Patient will report a 75% improvement in dizziness symptoms since [facility] in order to improve quality of life. 4. Patient will perform SOT at [facility] for his age group in order to demonstrate improving balance.        PLAN  [x]  Upgrade activities as tolerated     [x]  Continue plan of care  [x]  Update interventions per flow sheet       []  Discharge due to:_  []  Other:_      Celine Sun PT 1/4/2019  8:58 AM    Future Appointments   Date Time Provider Denisse Swan   1/4/2019  9:00 AM Jeyson Monte PT [facility]   1/4/2019  9:30 AM Jeyson Monte PT [facility]   1/8/2019  2:30 PM Jeyson Monte PT [facility]   1/8/2019  3:00 PM Jeyson Monte PT [facility]   1/10/2019  9:00 AM Christopher Hayden PT [facility]   1/10/2019  9:30 AM Relda Catracho, PT [facility]

## 2019-01-08 ENCOUNTER — HOSPITAL ENCOUNTER (OUTPATIENT)
Dept: PHYSICAL THERAPY | Age: 74
Discharge: HOME | End: 2019-01-08
Payer: MEDICARE

## 2019-01-08 PROCEDURE — 97112 NEUROMUSCULAR REEDUCATION: CPT

## 2019-01-08 PROCEDURE — 97164 PT RE-EVAL EST PLAN CARE: CPT

## 2019-01-08 PROCEDURE — 97110 THERAPEUTIC EXERCISES: CPT

## 2019-01-08 NOTE — PROGRESS NOTES
PT DAILY TREATMENT NOTE 10-18    Patient Name: Abdoul Thomas. Date:2019  : 1945  [x]  Patient  Verified  Payor: VA MEDICARE / Plan: VA MEDICARE PART A & B / Product Type: Medicare /    In time:2:30  Out time:3:08   Total Treatment Time (min): 38  Visit #:24 of 24    Medicare/BCBS Only   Total Timed Codes (min):  38 1:1 Treatment Time:  25       Treatment Area: Pain in right shoulder [M25.511]    SUBJECTIVE  Pain Level (0-10 scale): 0/10  Any medication changes, allergies to medications, adverse drug reactions, diagnosis change, or new procedure performed?: [x] No    [] Yes (see summary sheet for update)  Subjective functional status/changes:   [] No changes reported  Pt reports his right arm continues to get better. He still has pain with certain movements and reaching certain directions.       OBJECTIVE    10 minutes Re-Eval    28 min Therapeutic Exercise:  [x] See flow sheet :   Rationale: increase ROM and increase strength to improve the patients ability to improve ease of ADLs        With   [] TE   [] TA   [] neuro   [] other: Patient Education: [x] Review HEP    [] Progressed/Changed HEP based on:   [] positioning   [] body mechanics   [] transfers   [] heat/ice application    [] other:      Other Objective/Functional Measures:     Attempted wand AAROM flexion in standing this visit, but pt reported pain over bicep - no pain when pt performed in supine with HOB elevated to ~ 45 dgrs      AROM Right Shoulder  Flexion 119 dgs  Abduction 86 dgrs   Functional IR: Right buttocks    MMT right Shoulder  Flexion 4+/5  Abduction 4+/5  IR 4+/5  ER 4/5    FOTO 72    Pain Level (0-10 scale) post treatment: 0/10    ASSESSMENT/Changes in Function: See Progress Note     Patient will continue to benefit from skilled PT services to modify and progress therapeutic interventions, address ROM deficits, address strength deficits, analyze and address soft tissue restrictions, analyze and cue movement patterns, assess and modify postural abnormalities and instruct in home and community integration to attain remaining goals. Progress towards goals / Updated goals:  1. Patient will improve right shoulder AROM flex to 120 degrees and abd to 90 degrees in order to increase ease of reaching into cabinets at home.   2. Patient will improve behind back reaching to L4 in order to increase ease of putting on his belt  3.  Patient will improve right shoulder strength to 4/5 in order to increase ease of ADLs    PLAN  []  Upgrade activities as tolerated     [x]  Continue plan of care  []  Update interventions per flow sheet       []  Discharge due to:_  []  Other:_      Jocelyne Magaña, PT 1/8/2019  2:43 PM    Future Appointments   Date Time Provider Denisse Swan   1/8/2019  3:00 PM Isac Castellanos, PT [facility]   1/10/2019  9:00 AM Luther Logan, PT [facility]   1/10/2019  9:30 AM Luther Logan PT [facility]

## 2019-01-08 NOTE — PROGRESS NOTES
PT DAILY TREATMENT NOTE 10-18    Patient Name: Jammie Clement. Date:2019  : 1945  [x]  Patient  Verified  Payor: VA MEDICARE / Plan: VA MEDICARE PART A & B / Product Type: Medicare /    In time:3:08  Out time:3:31  Total Treatment Time (min): 23  Visit #: 3 of 16    Medicare/BCBS Only   Total Timed Codes (min):  23 1:1 Treatment Time:  23       Treatment Area: Dizziness and giddiness [R42]    SUBJECTIVE  Pain Level (0-10 scale): 0/10  Any medication changes, allergies to medications, adverse drug reactions, diagnosis change, or new procedure performed?: [x] No    [] Yes (see summary sheet for update)  Subjective functional status/changes:   [] No changes reported  Pt reports he still has dizziness every morning. He has not tried bending over for a while. OBJECTIVE    23 min Neuromuscular Re-education:  [x]  See flow sheet :   Rationale: improve coordination, improve balance and increase proprioception  to improve the patients ability to improve ease/safety with daily tasks        With   [] TE   [] TA   [] neuro   [] other: Patient Education: [x] Review HEP    [] Progressed/Changed HEP based on:   [] positioning   [] body mechanics   [] transfers   [] heat/ice application    [] other:      Other Objective/Functional Measures:     Roll test (-) B  No dizziness with sitting up from roll test  No dizziness with standing cone   Swaying but no LOB with Romberg Foam EC  Challenged with MSR Foam EC - required forearm on parallel bars for majority of each trial  No dizziness with VOR #1 or VOR CX    Dynamic Gait: ambulation with horizontal/vertical head turns, ambulation with VOR #1, ambulation with EC  No LOB during dynamic gait      Pain Level (0-10 scale) post treatment: 0/10    ASSESSMENT/Changes in Function:     Initiated interventions per POC this visit. Dizziness was not reproduced this visit.   Pt was challenged with MSR on compliant surface with vision removed, indicative of decreased vestibular input utilized to maintain balance. Will continue to address static/dynamic balance deficits for improved ease/safety with daily tasks. Pt demonstrated flirtatious behavior during therapy, evidenced by pt attempting to touch therapist's arm/hand multiple times, pt touching therapist's side one time, and pt kissing therapist on shoulder when patient asked for a hug following session and therapist consented to hug only. Pt also made multiple comments regarding therapist's physical appearance and comments regarding how he wished therapist was not engaged and he was not , stating, \"things would be different, I would be banging and knocking down your door\". Therapist in no way engaged with this behavior. Therapist changed the subject whenever pt made comments and attempted to keep physical distance from pt except for when guarding pt to ensure safety and when therapist consented to hug following session. Patient is to be scheduled only with a male therapist moving forward d/t inappropriate behavior exhibited during therapy session. Patient will continue to benefit from skilled PT services to modify and progress therapeutic interventions, address functional mobility deficits, address ROM deficits, address strength deficits, analyze and address soft tissue restrictions, analyze and cue movement patterns, assess and modify postural abnormalities, address imbalance/dizziness and instruct in home and community integration to attain remaining goals. Progress towards goals / Updated goals:  Short Term Goals: To be accomplished in 1 weeks:  1. Patient will demonstrate compliance with HEP in order to improve dizziness symptoms Goal met. 1/4/19     Long Term Goals: To be accomplished in 8 weeks:  1. Patient will improve FOTO score by 4 points in order to demonstrate a significant improvement in function.    2. Patient will improve FGA score by 4 points in order to demonstrate a significant improvement in balance. 3. Patient will report a 75% improvement in dizziness symptoms since [facility] in order to improve quality of life.    4. Patient will perform SOT at [facility] for his age group in order to demonstrate improving balance.      PLAN  []  Upgrade activities as tolerated     [x]  Continue plan of care  []  Update interventions per flow sheet       []  Discharge due to:_  []  Other:_      Rogelio Snow, PT 1/8/2019  3:12 PM    Future Appointments   Date Time Provider Denisse Swan   1/10/2019  9:00 AM Cj Barnes PT [facility]   1/10/2019  9:30 AM Cj Barnes PT [facility]

## 2019-01-08 NOTE — PROGRESS NOTES
[facility] [employer]  [address] [place] [address]  [phone] fax    Continued Plan of Care/ Re-certification for Physical Therapy Services    Patient name: Jordan Caal Start of Care: 10/17/18   Referral source: Tavia Walters, * : 1945   Medical/Treatment Diagnosis: Pain in right shoulder [M25.511]  Payor: [place] MEDICARE / Plan: VA MEDICARE PART A & B / Product Type: Medicare /  Onset Date:18     Prior Hospitalization: see medical history Provider#: [id number]   Medications: Verified on Patient Summary List    Comorbidities:  HTN, hearing impaired  Prior Level of Function: Ind with ADLs, right hand dominant, working, golf    Visits from [facility]: 24    Missed Visits: 0    The Plan of Care and following information is based on the patient's current status:  Goal: Patient will improve right shoulder AROM flex to 120 degrees and abd to 90 degrees in order to increase ease of reaching into cabinets at home.   Status at last note/certification: Progressing. Flexion 119 dgs  Abduction 86 dgrs   Current Status: not met    Goal: Patient will improve behind back reaching to L4 in order to increase ease of putting on his belt  Status at last note/certification: Not met. Right buttocks   Current Status: not met    Goal: Patient will improve right shoulder strength to 4/5 in order to increase ease of ADLs  Status at last note/certification: Goal met.    Flexion 4+/5  Abduction 4+/5  IR 4+/5  ER 4/5  Current Status: met    Key functional changes: improving strength/ROM of right shoulder, improving FOTO score indicative of functional improvement     Problems/ barriers to goal attainment: none     Problem List: pain affecting function, decrease ROM, decrease strength, impaired gait/ balance, decrease ADL/ functional abilitiies, decrease activity tolerance and decrease flexibility/ joint mobility    Treatment Plan: Therapeutic exercise, Therapeutic activities, Neuromuscular re-education, Physical agent/modality, Gait/balance training, Manual therapy, Patient education, Self Care training, Functional mobility training, Home safety training and Stair training     Patient Goal (s) has been updated and includes: more use of right arm      Goals for this certification period to be accomplished in 4 weeks:  1. Patient will improve right shoulder AROM flex to 120 degrees and abd to 90 degrees in order to increase ease of reaching into cabinets at home.   2. Patient will improve behind back reaching to L4 in order to increase ease of putting on his belt  3. Pt will demonstrate understanding/compliance with final HEP in order to allow pt to continue to progress independently upon DC. Frequency / Duration: Patient to be seen 2-3 times per week for 4 weeks:    Assessment / Recommendations:  Pt is making slow, steady progress towards updated goals in therapy. He reports reducing pain, levels but continues to report pain over right bicep with certain movements. Shoulder AROM and strength are steadily improving. He continues to demonstrate greatest mobility limitations in functional IR, consistent with mechanics involved with reverse total shoulder. Pt will benefit from continued skilled PT to address remaining strength, flexibility, and ROM deficits in order to increase functional use of right UE for improved ease of daily tasks and improved QOL. G-Codes (GP)    Carry   Current  CJ= 20-39%   [id number] Goal  CK= 40-59%    The severity rating is based on clinical judgment and the FOTO score. Certification Period: 1/8/19 to 2/6/19    Yana Whitt, PT 1/8/2019 2:48 PM    ________________________________________________________________________  I certify that the above Therapy Services are being furnished while the patient is under my care. I agree with the treatment plan and certify that this therapy is necessary.     [] I have read the above and request that my patient continue as recommended.   [] I have read the above report and request that my patient continue therapy with the following changes/special instructions: _______________________________________  [] I have read the above report and request that my patient be discharged from therapy    Physician's Signature:____________Date:_________TIME:________    ** Signature, Date and Time must be completed for valid certification **    Please sign and return to [facility] [employer]  [address] [place] [address]  [phone] fax

## 2019-01-10 ENCOUNTER — HOSPITAL ENCOUNTER (OUTPATIENT)
Dept: PHYSICAL THERAPY | Age: 74
Discharge: HOME | End: 2019-01-10
Payer: MEDICARE

## 2019-01-10 ENCOUNTER — APPOINTMENT (OUTPATIENT)
Dept: PHYSICAL THERAPY | Age: 74
End: 2019-01-10
Payer: MEDICARE

## 2019-01-10 PROCEDURE — 97112 NEUROMUSCULAR REEDUCATION: CPT

## 2019-01-10 NOTE — PROGRESS NOTES
PT DAILY TREATMENT NOTE 10-18    Patient Name: Alli Martinez. Date:1/10/2019  : 1945  [x]  Patient  Verified  Payor: Rebecca Read / Plan: VA MEDICARE PART A & B / Product Type: Medicare /    In time: 9:02  Out time: 9:32  Total Treatment Time (min): 30  Visit #: 4 of 16    Medicare/BCBS Only   Total Timed Codes (min):  30 1:1 Treatment Time:  30       Treatment Area: Dizziness and giddiness [R42]    SUBJECTIVE  Pain Level (0-10 scale):  0/10  Any medication changes, allergies to medications, adverse drug reactions, diagnosis change, or new procedure performed?: [x] No    [] Yes (see summary sheet for update)  Subjective functional status/changes:   [] No changes reported  Pt. Reports he is feeling a little more dizzy today. He reports he is unable to do his vestibular exercises at home because his bed is too soft. OBJECTIVE     30 min Neuromuscular Re-education:  [x]  See flow sheet : standing balance activities, tandem walking, ambulation with head turns, ambulation with VOR, ambulation with ball toss, eyes closed ambulation, backward walking   Rationale: increase strength, improve coordination and improve balance  to improve the patients ability to increase ease of ADLs          With   [] TE   [] TA   [x] neuro   [] other: Patient Education: [x] Review HEP    [] Progressed/Changed HEP based on:   [] positioning   [] body mechanics   [] transfers   [] heat/ice application    [] other:      Other Objective/Functional Measures:   Pt. Had no increase in dizziness with VOR activities  Reviewed Nathalia for HEP  He had increased dizziness with cone pick ups    Pain Level (0-10 scale) post treatment: 0/10    ASSESSMENT/Changes in Function:  Pt. Is progressing slowly towards goals. He continues to have dizziness with positional changes and decreased balance.     Patient will continue to benefit from skilled PT services to modify and progress therapeutic interventions, address functional mobility deficits, address ROM deficits and address strength deficits to attain remaining goals. Progress towards goals / Updated goals:  Short Term Goals: To be accomplished in 1 weeks:  1. Patient will demonstrate compliance with HEP in order to improve dizziness symptoms Goal met. 1/4/19     Long Term Goals: To be accomplished in 8 weeks:  1. Patient will improve FOTO score by 4 points in order to demonstrate a significant improvement in function. 2. Patient will improve FGA score by 4 points in order to demonstrate a significant improvement in balance. 3. Patient will report a 75% improvement in dizziness symptoms since [facility] in order to improve quality of life. Not met: pt. Continues to have dizziness with positional changes (1/10/19)  4.  Patient will perform SOT at [facility] for his age group in order to demonstrate improving balance.       PLAN  []  Upgrade activities as tolerated     [x]  Continue plan of care  []  Update interventions per flow sheet       []  Discharge due to:_  []  Other:_      Robbi Cordova, PT 1/10/2019  7:46 AM    Future Appointments   Date Time Provider Denisse Swan   1/10/2019  9:00 AM Wesley Hogue PT [facility]   1/10/2019  9:30 AM Wesley Hogue PT [facility]

## 2019-01-17 ENCOUNTER — HOSPITAL ENCOUNTER (OUTPATIENT)
Dept: PHYSICAL THERAPY | Age: 74
Discharge: HOME | End: 2019-01-17
Payer: MEDICARE

## 2019-01-17 PROCEDURE — 97110 THERAPEUTIC EXERCISES: CPT

## 2019-01-17 PROCEDURE — 97112 NEUROMUSCULAR REEDUCATION: CPT

## 2019-01-17 NOTE — PROGRESS NOTES
PT DAILY TREATMENT NOTE 10-18    Patient Name: Jose Ramon Patrick. Date:2019  : 1945  [x]  Patient  Verified  Payor: Sweta Lewis / Plan: VA MEDICARE PART A & B / Product Type: Medicare /    In time: 10:00  Out time: 10:28  Total Treatment Time (min): 28  Visit #: 5 of 16    Medicare/BCBS Only   Total Timed Codes (min):  28 1:1 Treatment Time:  28       Treatment Area: Dizziness and giddiness [R42]    SUBJECTIVE  Pain Level (0-10 scale):  0/10  Any medication changes, allergies to medications, adverse drug reactions, diagnosis change, or new procedure performed?: [x] No    [] Yes (see summary sheet for update)  Subjective functional status/changes:   [] No changes reported  Pt. Reports he is feeling about the same. He continues to have dizziness symptoms    OBJECTIVE     28 min Neuromuscular Re-education:  [x]  See flow sheet : standing balance activities, VOR activities, ambulation with VOR, carioca, slow walking, backward walking, ambulation with ball toss   Rationale: increase strength, improve coordination and improve balance  to improve the patients ability to increase ease of ADLs    With   [x] TE   [] TA   [] neuro   [] other: Patient Education: [x] Review HEP    [] Progressed/Changed HEP based on:   [] positioning   [] body mechanics   [] transfers   [] heat/ice application    [] other:      Other Objective/Functional Measures:   Pt. Performed cone pick ups from floor without dizziness or LOB   Pt. Tolerated VORx1 with no increase in dizziness  He has instability with eyes closed [place]    Pain Level (0-10 scale) post treatment: 0/10    ASSESSMENT/Changes in Function: pt. Is progressing slowly towards goals. He continues to have dizziness symptoms with positional changes but was able to perform cone pick ups from floor with no increase in symptoms.      Patient will continue to benefit from skilled PT services to modify and progress therapeutic interventions, address functional mobility deficits, address ROM deficits, address strength deficits, analyze and address soft tissue restrictions, analyze and cue movement patterns, analyze and modify body mechanics/ergonomics, assess and modify postural abnormalities and address imbalance/dizziness to attain remaining goals. Progress towards goals / Updated goals:  Short Term Goals: To be accomplished in 1 weeks:  1. Patient will demonstrate compliance with HEP in order to improve dizziness symptoms Goal met. 1/4/19     Long Term Goals: To be accomplished in 8 weeks:  1. Patient will improve FOTO score by 4 points in order to demonstrate a significant improvement in function. 2. Patient will improve FGA score by 4 points in order to demonstrate a significant improvement in balance. 3. Patient will report a 75% improvement in dizziness symptoms since [facility] in order to improve quality of life. Not met: pt. Continues to have dizziness with positional changes (1/17/19)  4.  Patient will perform SOT at [facility] for his age group in order to demonstrate improving balance.         PLAN  []  Upgrade activities as tolerated     [x]  Continue plan of care  []  Update interventions per flow sheet       []  Discharge due to:_  []  Other:_      Emi Barillas PT 1/17/2019  8:43 AM    Future Appointments   Date Time Provider Denisse Swan   1/17/2019 10:00 AM Yulia Snide, PT [facility]   1/17/2019 10:30 AM Yulia Snide, PT [facility]   1/23/2019  2:30 PM Yulia Snide, PT [facility]   1/23/2019  3:00 PM Yulia Snide, PT [facility]   1/25/2019  9:00 AM Yulia Snide, PT [facility]   1/25/2019  9:30 AM Yulia Snide, PT [facility]   1/30/2019 10:00 AM Yulia Snide, PT [facility]   1/30/2019 10:30 AM Yulia Snide, PT [facility]   2/1/2019  9:30 AM Yulia Dietrich, PT [facility]   2/1/2019 10:00 AM Yulia Dietrich, PT [facility]   2/4/2019  9:00 AM Yulia Dietrich, AYAKA [facility]   2/4/2019  9:30 AM Luther Logan, PT [facility]   2/6/2019  9:00 AM Luther Logan, PT [facility]   2/6/2019  9:30 AM Luther Logan, PT [facility]

## 2019-01-17 NOTE — PROGRESS NOTES
PT DAILY TREATMENT NOTE 10-18    Patient Name: Iván Hill. Date:2019  : 1945  [x]  Patient  Verified  Payor: Mitzi Johnston / Plan: VA MEDICARE PART A & B / Product Type: Medicare /    In time:10:29  Out time: 11:09  Total Treatment Time (min): 40  Visit #: 1 of     Medicare/BCBS Only   Total Timed Codes (min):  30 1:1 Treatment Time:  30       Treatment Area: Pain in right shoulder [M25.511]    SUBJECTIVE  Pain Level (0-10 scale): 0/10  Any medication changes, allergies to medications, adverse drug reactions, diagnosis change, or new procedure performed?: [x] No    [] Yes (see summary sheet for update)  Subjective functional status/changes:   [] No changes reported no pain in shoulder when it is at rest.     OBJECTIVE    Modalities: Ice: 10 min seated right shoulder    30 min Therapeutic Exercise:  [x] See flow sheet :   Rationale: increase ROM, increase strength, improve coordination and increase proprioception to improve the patients ability to continue to progression of therapy goals and decrease pain with movements. With   [x] TE   [] TA   [] neuro   [] other: Patient Education: [x] Review HEP    [] Progressed/Changed HEP based on:   [] positioning   [] body mechanics   [] transfers   [] heat/ice application    [] other:      Other Objective/Functional Measures:    Increased tricep extension weight to 4# utilizing keizer machine. Pt discontinued wall wipes & ball on wall circles with c/o fatigue & light pain at shoulder. Pain Level (0-10 scale) post treatment: 0/10    ASSESSMENT/Changes in Function: Treatment session initiated per POC & progressed as tolerable: Rhythmic stabilization progressed to completion in standing & tricep extension at kiezer increased to 4#. Pain lightly exacerbated during TE & was 100% resolved following 10' of ice.  Patient will continue to benefit from skilled PT services to address functional mobility deficits, address ROM deficits, address strength deficits and analyze and address soft tissue restrictions to attain remaining goals. Progress towards goals / Updated goals:  Goals for this certification period to be accomplished in 4 weeks:  1. Patient will improve right shoulder AROM flex to 120 degrees and abd to 90 degrees in order to increase ease of reaching into cabinets at home.   2. Patient will improve behind back reaching to L4 in order to increase ease of putting on his belt  3. Pt will demonstrate understanding/compliance with final HEP in order to allow pt to continue to progress independently upon DC.       PLAN  []  Upgrade activities as tolerated     [x]  Continue plan of care  []  Update interventions per flow sheet       []  Discharge due to:_  []  Other:_      Vasu Garcia, SPT 1/17/2019  11:07 AM    Future Appointments   Date Time Provider Denisse Swan   1/23/2019  2:30 PM [place] Nay, PT [facility]   1/23/2019  3:00 PM [place] Nay, PT [facility]   1/25/2019  9:00 AM [place] Nay, PT [facility]   1/25/2019  9:30 AM [place] Nay, PT [facility]   1/30/2019 10:00 AM [place] Nay, PT [facility]   1/30/2019 10:30 AM [place], PT [facility]   2/1/2019  9:30 AM [place] Nay, PT [facility]   2/1/2019 10:00 AM [place] Nay, PT [facility]   2/4/2019  9:00 AM [place] Nay, PT [facility]   2/4/2019  9:30 AM [place] Nay, PT [facility]   2/6/2019  9:00 AM [place] Nay, PT [facility]   2/6/2019  9:30 AM [place] Nay, PT [facility]

## 2019-01-23 ENCOUNTER — HOSPITAL ENCOUNTER (OUTPATIENT)
Dept: PHYSICAL THERAPY | Age: 74
Discharge: HOME | End: 2019-01-23
Payer: MEDICARE

## 2019-01-23 PROCEDURE — 97110 THERAPEUTIC EXERCISES: CPT

## 2019-01-23 PROCEDURE — 97112 NEUROMUSCULAR REEDUCATION: CPT

## 2019-01-23 NOTE — PROGRESS NOTES
PT DAILY TREATMENT NOTE 10-18    Patient Name: Boy Templeton. Date:2019  : 1945  [x]  Patient  Verified  Payor: Kenzie Calle / Plan: VA MEDICARE PART A & B / Product Type: Medicare /    In time: 2:58  Out time: 3:38  Total Treatment Time (min):  40  Visit #: 2 of     Medicare/BCBS Only   Total Timed Codes (min):  30 1:1 Treatment Time:  25       Treatment Area: Pain in right shoulder [M25.511]    SUBJECTIVE  Pain Level (0-10 scale): 0/10  Any medication changes, allergies to medications, adverse drug reactions, diagnosis change, or new procedure performed?: [x] No    [] Yes (see summary sheet for update)  Subjective functional status/changes:   [] No changes reported  Pt.  Reports he still has difficulty reaching behind his back    OBJECTIVE    Modality rationale: decrease pain to improve the patients ability to increase ease of ADLs   Type Additional Details   [] Estim:  []Unatt       []IFC  []Premod                        []Other:  []w/ice   []w/heat  Position:  Location:   [] Estim: []Att    []TENS instruct  []NMES                    []Other:  []w/US   []w/ice   []w/heat  Position:  Location:   []  Traction: [] Cervical       []Lumbar                       [] Prone          []Supine                       []Intermittent   []Continuous Lbs:  [] before manual  [] after manual   []  Ultrasound: []Continuous   [] Pulsed                           []1MHz   []3MHz W/cm2:  Location:   []  Iontophoresis with dexamethasone         Location: [] Take home patch   [] In clinic   [x]  Ice 10 min     []  heat  []  Ice massage  []  Laser   []  Anodyne Position: seated  Location: right shoulder   []  Laser with stim  []  Other:  Position:  Location:   []  Vasopneumatic Device Pressure:       [] lo [] med [] hi   Temperature: [] lo [] med [] hi   [x] Skin assessment post-treatment:  [x]intact []redness- no adverse reaction    []redness - adverse reaction:     30 min Therapeutic Exercise:  [x] See flow sheet :   Rationale: increase ROM and increase strength to improve the patients ability to increase ease of ADLs          With   [x] TE   [] TA   [] neuro   [] other: Patient Education: [x] Review HEP    [] Progressed/Changed HEP based on:   [] positioning   [] body mechanics   [] transfers   [] heat/ice application    [] other:      Other Objective/Functional Measures:   Left behind back reaching: Right PSIS  Pt. Was challenged with 1# resistance during flexion/scapion  No significant pain with simulated chipping    Pain Level (0-10 scale) post treatment:  0/10    ASSESSMENT/Changes in Function:  Pt. Is progressing slowly towards goals. He demonstrates improving shoulder mobility in ER but continues to have less than 90 degrees during abduction. He also continues to have difficulty with reaching behind his back. He was educated on avoiding pain during HEP and when he goes to the gym. Patient will continue to benefit from skilled PT services to modify and progress therapeutic interventions, address functional mobility deficits, address ROM deficits, address strength deficits, analyze and address soft tissue restrictions, analyze and cue movement patterns, analyze and modify body mechanics/ergonomics and assess and modify postural abnormalities to attain remaining goals. Progress towards goals / Updated goals:  Goals for this certification period to be accomplished in 4 weeks:  1. Patient will improve right shoulder AROM flex to 120 degrees and abd to 90 degrees in order to increase ease of reaching into cabinets at home.   2. Patient will improve behind back reaching to L4 in order to increase ease of putting on his belt  3.  Pt will demonstrate understanding/compliance with final HEP in order to allow pt to continue to progress independently upon DC.      PLAN  []  Upgrade activities as tolerated     [x]  Continue plan of care  []  Update interventions per flow sheet       []  Discharge due to:_  []  Other:_ Alcides Carrion, PT 1/23/2019  2:37 PM    Future Appointments   Date Time Provider Densise Swan   1/23/2019  3:00 PM Radha Shrestha, PT [facility] [address]   1/25/2019  5:00 PM Radha Shrestha, PT [facility] [address]   1/25/2019  5:30 PM Radha Shrestha, PT [facility] [address]   1/30/2019 10:00 AM Radha Shrestha, PT [facility] [address]   1/30/2019 10:30 AM Radha Shrestha, PT [facility] [address]   2/1/2019  9:30 AM Radha Shrestha, PT [facility] [address]   2/1/2019 10:00 AM Radha Shrestha, PT [facility] [address]   2/4/2019  9:00 AM Radha Shrestha, PT [facility] [address]   2/4/2019  9:30 AM Radha Shrestha, PT [facility] [address]   2/6/2019  9:00 AM Radha Shrestha, PT [facility] [address]   2/6/2019  9:30 AM Radha Shrestha, PT [facility] [address]

## 2019-01-23 NOTE — PROGRESS NOTES
PT DAILY TREATMENT NOTE 10-18    Patient Name: Manfred Lindsey. Date:2019  : 1945  [x]  Patient  Verified  Payor: [place] MEDICARE / Plan: [place] MEDICARE PART A & B / Product Type: Medicare /    In time:02:30  Out time: 02:56  Total Treatment Time (min): 26  Visit #: 5 of 16    Medicare/BCBS Only   Total Timed Codes (min):  26 1:1 Treatment Time:  26       Treatment Area: Dizziness and giddiness [R42]    SUBJECTIVE  Pain Level (0-10 scale): 0/10  Any medication changes, allergies to medications, adverse drug reactions, diagnosis change, or new procedure performed?: [x] No    [] Yes (see summary sheet for update)  Subjective functional status/changes:   [x] No changes reported  Still a bit dizzy when getting up from bed in the mornings, but feeling better overall. OBJECTIVE  26 min Neuromuscular Re-education:  [x]  See flow sheet :   Rationale: improve coordination, improve balance and increase proprioception  to improve the patients ability to return to PLOF with reduced risk for falls. With   [] TE   [] TA   [x] neuro   [] other: Patient Education: [x] Review HEP    [] Progressed/Changed HEP based on:   [] positioning   [] body mechanics   [] transfers   [] heat/ice application    [] other:      Other Objective/Functional Measures:   Pt able to complete all VOR exercises on foam while marching with 0 LOB. Pt challenged with BOSU balance: WBOS and EO able to stand for 14 seconds; LOB self corrected with use of hand rails. Pain Level (0-10 scale) post treatment: 0/10    ASSESSMENT/Changes in Function:   Pt able to complete all VOR exercises & obstacle course with 0 LOB & no c/o dizziness. Obstacle course included short & tall hurdles with 6 floor height cone ; pt completed forward and in sidestepping (each direction) with 0 LOB & no c/o dizziness. Patient will continue to benefit from skilled PT services to address imbalance/dizziness to attain remaining goals.           Progress towards goals / Updated goals:  Short Term Goals: To be accomplished in 1 weeks:  1. Patient will demonstrate compliance with HEP in order to improve dizziness symptoms Goal met. 1/4/19  Long Term Goals: To be accomplished in 8 weeks:  1. Patient will improve FOTO score by 4 points in order to demonstrate a significant improvement in function. 2. Patient will improve FGA score by 4 points in order to demonstrate a significant improvement in balance. 3. Patient will report a 75% improvement in dizziness symptoms since [facility] in order to improve quality of life.   Not met: pt. Continues to have dizziness with positional changes (1/17/19)  4.  Patient will perform SOT at [facility] for his age group in order to demonstrate improving balance.     PLAN  []  Upgrade activities as tolerated     [x]  Continue plan of care  []  Update interventions per flow sheet       []  Discharge due to:_  []  Other:_      Gil Gilbert, KOREY 1/23/2019  3:04 PM    Future Appointments   Date Time Provider Denisse Swan   1/25/2019  5:00 PM Felecia Linton PT [facility]   1/25/2019  5:30 PM Felecia Linton PT [facility]   1/30/2019 10:00 AM Felecia Linton PT [facility]   1/30/2019 10:30 AM Felecia Linton PT [facility]   2/1/2019  9:30 AM Felecia Linton PT [facility]   2/1/2019 10:00 AM Felecia Linton PT [facility]   2/4/2019  9:00 AM Felecia Linton PT [facility]   2/4/2019  9:30 AM Felecia Linton PT [facility]   2/6/2019  9:00 AM Felecia Linton PT [facility]   2/6/2019  9:30 AM Felecia Linton PT [facility]

## 2019-01-25 ENCOUNTER — HOSPITAL ENCOUNTER (OUTPATIENT)
Dept: PHYSICAL THERAPY | Age: 74
Discharge: HOME | End: 2019-01-25
Payer: MEDICARE

## 2019-01-25 PROCEDURE — 97110 THERAPEUTIC EXERCISES: CPT

## 2019-01-25 PROCEDURE — 97112 NEUROMUSCULAR REEDUCATION: CPT

## 2019-01-25 NOTE — PROGRESS NOTES
PT DAILY TREATMENT NOTE 10-18    Patient Name: Paulina Poole. Date:2019  : 1945  [x]  Patient  Verified  Payor: VA MEDICARE / Plan: VA MEDICARE PART A & B / Product Type: Medicare /    In time:04:59  Out time:03:38  Total Treatment Time (min): 39  Visit #: 3 of     Medicare/BCBS Only   Total Timed Codes (min):  39 1:1 Treatment Time:  39       Treatment Area: Pain in right shoulder [M25.511]    SUBJECTIVE  Pain Level (0-10 scale): 0/10  Any medication changes, allergies to medications, adverse drug reactions, diagnosis change, or new procedure performed?: [x] No    [] Yes (see summary sheet for update)  Subjective functional status/changes:   [] No changes reported  Still pain when tucking shirt in. OBJECTIVE    39 min Therapeutic Exercise:  [x] See flow sheet :   Rationale: increase ROM, increase strength and reduce pain with motion  to improve the patients ability to complete ADLs with ease. With   [x] TE   [] TA   [] neuro   [] other: Patient Education: [x] Review HEP    [] Progressed/Changed HEP based on:   [] positioning   [x] body mechanics   [] transfers   [] heat/ice application    [] other:      Other Objective/Functional Measures:   - pt demos proper exercise technique with minimal cuing: tolerated increased rep & addition TE well; mild c/o fatigue following wall wipes and ball on wall. - pt able to complete TE with minimal c/o pain      Pain Level (0-10 scale) post treatment: 0/10    ASSESSMENT/Changes in Function:   - pt demos continued reduction in abduction and internal rotation.    - pt is progressing slowly towards goals  - pt reminded to keep exercises in pain free range  - added red swiss ball on wall bounce; pt able to complete 10\" x 2 trials & 20\" x 2 trials with moderate c/o fatigue.   - Patient will continue to benefit from skilled PT services to modify and progress therapeutic interventions, address functional mobility deficits, address ROM deficits, address strength deficits and analyze and address soft tissue restrictions to attain remaining goals. Progress towards goals / Updated goals:  Goals for this certification period to be accomplished in 4 weeks:  1. Patient will improve right shoulder AROM flex to 120 degrees and abd to 90 degrees in order to increase ease of reaching into cabinets at home.   2. Patient will improve behind back reaching to L4 in order to increase ease of putting on his belt  3. Pt will demonstrate understanding/compliance with final HEP in order to allow pt to continue to progress independently upon DC.      PLAN  [x]  Upgrade activities as tolerated     [x]  Continue plan of care  []  Update interventions per flow sheet       []  Discharge due to:_  []  Other:_      Ezdavid Vianca, SPT 1/25/2019  5:04 PM    I was present during the entire treatment, directing and participating in the treatment.     Future Appointments   Date Time Provider Denisse Swan   1/25/2019  5:30 PM Ramiro Sailors, PT [facility]   1/28/2019  2:30 PM Ramiro Sailors, PT [facility]   1/28/2019  5:30 PM Ramiro Sailors, PT [facility]   1/30/2019 10:00 AM Ramiro Sailors, PT [facility]   1/30/2019 10:30 AM Ramiro Sailors, PT [facility]   2/1/2019  9:30 AM Ramiro Sailors, PT [facility]   2/1/2019 10:00 AM Ramiro Sailors, PT [facility]   2/2/2019 10:00 AM Ramiro Sailors, PT [facility]   2/4/2019  9:00 AM Ramiro Sailors, PT [facility]   2/4/2019  9:30 AM Ramiro Sailors, PT [facility]   2/4/2019 10:00 AM Ramiro Sailors, PT [facility]   2/4/2019  4:30 PM Ramiro Sailors, PT [facility]   2/6/2019  9:00 AM Ramiro Sailors, PT [facility]   2/6/2019  9:30 AM Ramiro Sailors, PT [facility]   2/7/2019  9:30 AM Ramiro Sailors, PT [facility]   2/7/2019 10:30 AM Ramiro Sailors, PT [facility]   2/9/2019  9:30 AM Ramiro Sailors, PT [facility]

## 2019-01-25 NOTE — PROGRESS NOTES
PT DAILY TREATMENT NOTE 10-18    Patient Name: Gurinder Padilla. Date:2019  : 1945  [x]  Patient  Verified  Payor: Yinka Mccoy / Plan: VA MEDICARE PART A & B / Product Type: Medicare /    In time: 5:38  Out time: 6:01  Total Treatment Time (min): 23  Visit #: 6 of 16    Medicare/BCBS Only   Total Timed Codes (min):  23 1:1 Treatment Time:  23       Treatment Area: Dizziness and giddiness [R42]    SUBJECTIVE  Pain Level (0-10 scale): 0/10  Any medication changes, allergies to medications, adverse drug reactions, diagnosis change, or new procedure performed?: [x] No    [] Yes (see summary sheet for update)  Subjective functional status/changes:   [] No changes reported   pt reports he is doing pretty good. He reports his balance is getting better. He continues to have most dizziness with first getting up from bed. OBJECTIVE     23 min Neuromuscular Re-education:  [x]  See flow sheet :   Rationale: increase strength, improve coordination and improve balance  to improve the patients ability to ambulate and perform bed mobility at home    With   [] TE   [] TA   [x] neuro   [] other: Patient Education: [x] Review HEP    [] Progressed/Changed HEP based on:   [] positioning   [] body mechanics   [] transfers   [] heat/ice application    [] other:      Other Objective/Functional Measures:   FGA:29/30  Pt. Tolerated PT well with no reports of increased dizziness  He was challenged with coordinating VORx2    Pain Level (0-10 scale) post treatment:  0/10    ASSESSMENT/Changes in Function:  Pt. Is progressing well with physical therapy. He is having less dizziness over all but continues to worsen with positional changes. His FGA score improved significantly to 29/30 points indicating improving balance with ambulation.      Patient will continue to benefit from skilled PT services to modify and progress therapeutic interventions, address functional mobility deficits, address ROM deficits, address strength deficits, analyze and address soft tissue restrictions, analyze and cue movement patterns, analyze and modify body mechanics/ergonomics and assess and modify postural abnormalities to attain remaining goals. Progress towards goals / Updated goals:  Short Term Goals: To be accomplished in 1 weeks:  1. Patient will demonstrate compliance with HEP in order to improve dizziness symptoms Goal met. 1/4/19    Long Term Goals: To be accomplished in 8 weeks:  1. Patient will improve FOTO score by 4 points in order to demonstrate a significant improvement in function. 2. Patient will improve FGA score by 4 points in order to demonstrate a significant improvement in balance. Met: 29/30 (1/25/19)  3. Patient will report a 75% improvement in dizziness symptoms since [facility] in order to improve quality of life.   Not met: pt. Continues to have dizziness with positional changes (1/17/19)  4.  Patient will perform SOT at [facility] for his age group in order to demonstrate improving balance.         PLAN  []  Upgrade activities as tolerated     [x]  Continue plan of care  []  Update interventions per flow sheet       []  Discharge due to:_  []  Other:_      Alcides Carrion, AYAKA 1/25/2019  5:21 PM    Future Appointments   Date Time Provider Denisse Swan   1/25/2019  5:30 PM Radha Shrestha PT [facility]   1/30/2019 10:00 AM Radha Shrestha PT [facility]   1/30/2019 10:30 AM Radha Shrestha, PT [facility]   2/1/2019  9:30 AM Radha Shrestha, PT [facility]   2/1/2019 10:00 AM Radha Shrestha, PT [facility]   2/2/2019 10:00 AM Radha Shrestha, PT [facility]   2/4/2019  9:00 AM Radha Shrestha, PT [facility]   2/4/2019  9:30 AM Radha Shrestha, PT [facility]   2/4/2019 10:00 AM Radha Shrestha, PT [facility]   2/4/2019  4:30 PM Radha Shrestha, PT [facility]   2/6/2019  9:00 AM Radha Shrestha, PT [facility]   2/6/2019  9:30 AM Radha Shrestha, PT [facility] 2/7/2019  9:30 AM Naina Chao, PT [facility]   2/7/2019 10:30 AM Naina Chao, PT [facility]   2/9/2019  9:30 AM Naina Chao, PT [facility]

## 2019-01-28 ENCOUNTER — APPOINTMENT (OUTPATIENT)
Dept: PHYSICAL THERAPY | Age: 74
End: 2019-01-28
Payer: MEDICARE

## 2019-01-30 ENCOUNTER — HOSPITAL ENCOUNTER (OUTPATIENT)
Dept: PHYSICAL THERAPY | Age: 74
Discharge: HOME | End: 2019-01-30
Payer: MEDICARE

## 2019-01-30 PROCEDURE — 97112 NEUROMUSCULAR REEDUCATION: CPT

## 2019-01-30 PROCEDURE — 97110 THERAPEUTIC EXERCISES: CPT

## 2019-01-30 NOTE — PROGRESS NOTES
[facility] [place] [employer]  [place]  [address] [place] [address]  [phone] fax    Continued Plan of Care/ Re-certification for Physical Therapy Services    Patient name: Adeola Silva Start of Care: 18   Referral source: Priyank Barbosa MD : 1945   Medical/Treatment Diagnosis: Dizziness and giddiness [R42]  Payor: VA MEDICARE / Plan: VA MEDICARE PART A & B / Product Type: Medicare /  Onset Date: on & off for 10 years     Prior Hospitalization: see medical history Provider#: [id number]   Medications: Verified on Patient Summary List    Comorbidities: RTSA, HTN, hearing impaired   Prior Level of Function: Ind with all ADLs  Visits from Start of Care: 8    Missed Visits: 0    The Plan of Care and following information is based on the patient's current status:  Goal: Patient will demonstrate compliance with HEP in order to improve dizziness symptoms  Status at last note/certification: N/A  Current Status: met    Goal: Patient will improve FOTO score by 4 points in order to demonstrate a significant improvement in function. Status at last note/certification: N/A  Current Status: progressin19 improved by 2 points     Goal:  Patient will report a 75% improvement in dizziness symptoms since [facility] in order to improve quality of life. Status at last note/certification: N/A  Current Status: progressing: pt reports 71% improvement in s/s (5/7 on FOTO assessment)    Goal: Patient will perform SOT at [facility] for his age group in order to demonstrate improving balance.   Status at last note/certification: N/A  Current Status: met    Key functional changes:   Pt demos good/fair balance on compliant surface, eyes closed, vertical head turns. LOB x 1 in each 30\" trial. Pt able to self correct utilizing BUE assist of // bars.  Pt has no c/o of dizziness with clinic activities; however continues to demo LOB with increases balance challenges, greatest with eyes closed activities. Pt able to clear short & tall hurdles and complete cone  from floor level obstacle course with 0 LOB & no c/o dizziness. Problems/ barriers to goal attainment: none    Problem List: impaired gait/ balance    Treatment Plan: Therapeutic activities, Neuromuscular re-education, Gait/balance training and Stair training     Patient Goal (s) has been updated and includes: Have no dizziness with positional changes. Goals for this certification period to be accomplished in 4 weeks:  1. Patient will improve FOTO score by 4 points in order to demonstrate a significant improvement in function. 2. Patient will report a 75% improvement in dizziness symptoms since [facility] in order to improve quality of life. Frequency / Duration: Patient to be seen 2 times per week for 4 weeks:    Assessment / Recommendations:   Pt is making steady progress towards long term vestibular goals. Continues to meet challenges in clinic with no c/o of dizziness, however occasion LOB is experienced with ability tot self correct using UE support in // bars. Pt education to increase speed of head turns in VOR exercises in order to further challenge the vestibular system. Skilled physical therapy interventions are medically necessary in order to modify & progress therapeutic interventions, address functional mobility deficits, and decrease dizzy symptoms with positional changes in order to improve pt's overall QOL & return to PLOF with decreased risk for falls. Certification Period: 01.30.19 - 02.28.19    Barnabas Boast, SPT 1/30/2019 11:47 AM    ________________________________________________________________________  I certify that the above Therapy Services are being furnished while the patient is under my care. I agree with the treatment plan and certify that this therapy is necessary. [] I have read the above and request that my patient continue as recommended.   [] I have read the above report and request that my patient continue therapy with the following changes/special instructions: _______________________________________  [] I have read the above report and request that my patient be discharged from therapy    Physician's Signature:____________Date:_________TIME:________    ** Signature, Date and Time must be completed for valid certification **    Please sign and return to [facility] [employer]  [address] [place] [address]  [phone] fax

## 2019-01-30 NOTE — PROGRESS NOTES
PT DAILY TREATMENT NOTE 10-18    Patient Name: Reji Adrian. Date:2019  : 1945  [x]  Patient  Verified  Payor: VA MEDICARE / Plan: VA MEDICARE PART A & B / Product Type: Medicare /    In time:10:00  Out time:10:27  Total Treatment Time (min): 27  Visit #: 7 of 16    Medicare/BCBS Only   Total Timed Codes (min):  27 1:1 Treatment Time:  26       Treatment Area: Dizziness and giddiness [R42]    SUBJECTIVE  Pain Level (0-10 scale): 0/10  Any medication changes, allergies to medications, adverse drug reactions, diagnosis change, or new procedure performed?: [x] No    [] Yes (see summary sheet for update)  Subjective functional status/changes:   [] No changes reported  \"Dizziness still improving; worst at getting up & out of bed. \"    OBJECTIVE    23 min Neuromuscular Re-education:  [x]  See flow sheet :   Rationale: improve coordination, improve balance and increase proprioception  to improve the patients ability to ambulate home & community with decreased risk for falls. With   [] TE   [] TA   [x] neuro   [] other: Patient Education: [x] Review HEP    [] Progressed/Changed HEP based on:   [] positioning   [] body mechanics   [] transfers   [] heat/ice application    [] other:      Other Objective/Functional Measures:  - pt demos good/fair balance on compliant surface, eyes closed, vertical head turns. LOB x 1 in each 30\" trial. Pt able to self correct utilizing // bars and BUE assist.      Pain Level (0-10 scale) post treatment: 0/10    ASSESSMENT/Changes in Function:   - pt progressed through balance challenges well; mild LOB in tandem on foam EC + mild LOB in rhomberg EC with vertical head turns on foam. Stand by assist utilized in // bars & pt able to self correct LOB. - pt continue to demo progression towards long term goals & discharge was discussed at today's session. Pt interested in continuing for a few more vestibular visits.  Pt verbally acknowledged understanding of not maxing out all available PT visits in the event that later in the year, PT services might be needed. - Patient will continue to benefit from skilled PT services to modify and progress therapeutic interventions and address imbalance/dizziness to attain remaining goals. []  See Plan of Care  [x]  See progress note/recertification  []  See Discharge Summary         Progress towards goals / Updated goals:  Long Term Goals: To be accomplished in 8 weeks:  1. Patient will improve FOTO score by 4 points in order to demonstrate a significant improvement in function.   01.30.19: NOT MET: improved by 2 points  2. Patient will improve FGA score by 4 points in order to demonstrate a significant improvement in balance. Met: 29/30 (1/25/19)  3. Patient will report a 75% improvement in dizziness symptoms since [facility] in order to improve quality of life.   Not met: pt. Continues to have dizziness with positional changes (1/17/19)  01.30.19: NOT MET: pt reports 71% improvement in s/s (5/7 on FOTO assessment)  4.  Patient will perform SOT at [facility] for his age group in order to demonstrate improving balance. (MET)    PLAN  [x]  Upgrade activities as tolerated     [x]  Continue plan of care  []  Update interventions per flow sheet       []  Discharge due to:_  []  Other:_      Vanessa Linares, KOREY 1/30/2019  8:13 AM    Future Appointments   Date Time Provider Denisse Swan   1/30/2019 10:00 AM Dani Sorento, PT [facility]   1/30/2019 10:30 AM Normajemirza Sorento, PT [facility]   2/1/2019  9:30 AM Normajean Sorento, PT [facility]   2/1/2019 10:00 AM Normajean Sorento, PT [facility]   2/2/2019 10:00 AM Normajean Sorento, PT [facility]   2/4/2019  9:00 AM Normajemirza Sorento, PT [facility]   2/4/2019  9:30 AM Dani Sorento, PT [facility]   2/6/2019  9:00 AM Dani Sorento, PT [facility]   2/6/2019  9:30 AM Dani Sorento, PT [facility]   2/7/2019  9:30 AM Dani Sorento, PT [id number]   2/7/2019 10:30 AM Cj Barnes PT [id number]   2/9/2019  9:30 AM Cj Barnes PT [facility]

## 2019-01-30 NOTE — PROGRESS NOTES
PT DAILY TREATMENT NOTE 10-18    Patient Name: Ida Saleh Date:2019  : 1945  [x]  Patient  Verified  Payor: [place] MEDICARE / Plan: [place] MEDICARE PART A & B / Product Type: Medicare /    In time:10:28  Out time: 11:21  Total Treatment Time (min): 53  Visit #: 4 of     Medicare/BCBS Only   Total Timed Codes (min):  43 1:1 Treatment Time:  41       Treatment Area: Pain in right shoulder [M25.511]    SUBJECTIVE  Pain Level (0-10 scale): 0/10 @ rest. 7/10 with use  Any medication changes, allergies to medications, adverse drug reactions, diagnosis change, or new procedure performed?: [x] No    [] Yes (see summary sheet for update)  Subjective functional status/changes:    \"Shoulder is getting there. 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 \" -uncertain if he is returning full or part time. OBJECTIVE  Modality rationale: decrease pain to improve the patients ability to return to PLOF. Min Type Additional Details   10 [x]  Ice     []  heat  []  Ice massage  []  Laser   []  Anodyne Position: seated  Location: right shoulder   [] Skin assessment post-treatment:  []intact []redness- no adverse reaction    []redness - adverse reaction:   32 min Therapeutic Exercise:  [x] See flow sheet :   Rationale: increase ROM and increase strength to improve the patients ability to return to PLOF. 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            With   [x] TE   [] TA   [] neuro   [] other: Patient Education: [x] Review HEP    [] Progressed/Changed HEP based on:   [] positioning   [] body mechanics   [] transfers   [] heat/ice application    [] other:      Other Objective/Functional Measures:   - pt continues to demo decreased ROM & strength through RUE as expected & appropriate for this phase of RTSA rehab    Pain Level (0-10 scale) post treatment: 2/10: emmanuel    ASSESSMENT/Changes in Function:   - started session with wall wipes, clockwise+counterclockwise ball on wall due to fatigue at last session's end with this TE.  - pt able to tolerate increase in weight & reps well with only complaint of fatigue with horizontal wall wipes. - held ball on wall bounce stabilization secondary to c/o fatigue.   - Patient will continue to benefit from skilled PT services to modify and progress therapeutic interventions, address functional mobility deficits, address ROM deficits and address strength deficits to attain remaining goals. Progress towards goals / Updated goals:  Goals for this certification period to be accomplished in 4 weeks:  1. Patient will improve right shoulder AROM flex to 120 degrees and abd to 90 degrees in order to increase ease of reaching into cabinets at home.   2. Patient will improve behind back reaching to L4 in order to increase ease of putting on his belt  3.  Pt will demonstrate understanding/compliance with final HEP in order to allow pt to continue to progress independently upon DC.      PLAN  [x]  Upgrade activities as tolerated     [x]  Continue plan of care  []  Update interventions per flow sheet       []  Discharge due to:_  []  Other:_      Brian Coley, SPT 1/30/2019  8:17 AM    Future Appointments   Date Time Provider Denisse Swan   1/30/2019 10:00 AM Ramiro Sailors, PT [facility]   1/30/2019 10:30 AM Ramiro Sailors, PT [facility]   2/1/2019  9:30 AM Ramiro Sailors, PT [facility]   2/1/2019 10:00 AM Ramiro Sailors, PT [facility]   2/2/2019 10:00 AM Ramiro Sailors, PT [facility]   2/4/2019  9:00 AM Ramiro Sailors, PT [facility]   2/4/2019  9:30 AM Ramiro Sailors, PT [facility]   2/6/2019  9:00 AM Ramiro Sailors, PT [facility]   2/6/2019  9:30 AM Ramiro Steiner, PT [facility]   2/7/2019  9:30 AM Ramiro Steiner, PT [facility] 2/7/2019 10:30 AM Evangelista Elizabeth, PT [facility]   2/9/2019  9:30 AM Evangelista Elizabeth PT [facility]

## 2019-02-01 ENCOUNTER — HOSPITAL ENCOUNTER (OUTPATIENT)
Dept: PHYSICAL THERAPY | Age: 74
Discharge: HOME | End: 2019-02-01
Payer: MEDICARE

## 2019-02-01 PROCEDURE — 97110 THERAPEUTIC EXERCISES: CPT

## 2019-02-01 PROCEDURE — 97116 GAIT TRAINING THERAPY: CPT

## 2019-02-01 PROCEDURE — 97140 MANUAL THERAPY 1/> REGIONS: CPT

## 2019-02-01 PROCEDURE — 97112 NEUROMUSCULAR REEDUCATION: CPT

## 2019-02-01 NOTE — PROGRESS NOTES
PT DAILY TREATMENT NOTE 10-18    Patient Name: Denver Wang. Date:2019  : 1945  [x]  Patient  Verified  Payor: VA MEDICARE / Plan: VA MEDICARE PART A & B / Product Type: Medicare /    In time: 9:31  Out time: 10:00  Total Treatment Time (min): 29  Visit #: 5 of     Medicare/BCBS Only   Total Timed Codes (min):  29 1:1 Treatment Time:  29       Treatment Area: Pain in right shoulder [M25.511]    SUBJECTIVE  Pain Level (0-10 scale): 4/10  Any medication changes, allergies to medications, adverse drug reactions, diagnosis change, or new procedure performed?: [x] No    [] Yes (see summary sheet for update)  Subjective functional status/changes:   [] No changes reported  Pt. Reports he has been feeling a little sore the past few days. He reports pain is in his biceps still. OBJECTIVE    19 min Therapeutic Exercise:  [x] See flow sheet :   Rationale: increase ROM and increase strength to improve the patients ability to increase ease of reaching                                                                                                                                                                     10 min Manual Therapy:  Trigger point release right infraspinatus   Rationale: decrease pain, increase ROM and increase tissue extensibility to increase ease of ADLs          With   [x] TE   [] TA   [] neuro   [] other: Patient Education: [x] Review HEP    [] Progressed/Changed HEP based on:   [] positioning   [] body mechanics   [] transfers   [] heat/ice application    [] other:      Other Objective/Functional Measures:   Right shoulder AROM flex: 120 abd: 85 degrees  Pt. Has multiple trigger points along infraspinatus  Pt. Was educated on self trigger point release for HEP     Pain Level (0-10 scale) post treatment: 0/10    ASSESSMENT/Changes in Function: pt. Is progressing slowly towards goals. He continues to have moderate pain with lifting his arm into abduction.  He has significant tightness and trigger points in posterior shoulder. Pt. Continues to demonstrate decreased right shoulder mobility but it is improving. Patient will continue to benefit from skilled PT services to modify and progress therapeutic interventions, address functional mobility deficits, address ROM deficits, address strength deficits, analyze and address soft tissue restrictions, analyze and cue movement patterns, analyze and modify body mechanics/ergonomics and assess and modify postural abnormalities to attain remaining goals. Progress towards goals / Updated goals:  Goals for this certification period to be accomplished in 4 weeks:  1. Patient will improve right shoulder AROM flex to 120 degrees and abd to 90 degrees in order to increase ease of reaching into cabinets at home.   Progressing: flex: 120 abd: 85 degrees (2/1/19)  2. Patient will improve behind back reaching to L4 in order to increase ease of putting on his belt  3.  Pt will demonstrate understanding/compliance with final HEP in order to allow pt to continue to progress independently upon DC.          PLAN  []  Upgrade activities as tolerated     [x]  Continue plan of care  []  Update interventions per flow sheet       []  Discharge due to:_  []  Other:_      Aaron Yang, PT 2/1/2019  9:32 AM    Future Appointments   Date Time Provider Denisse Swan   2/1/2019 10:00 AM Fabien Tejeda PT [facility]   2/4/2019  9:00 AM Fabien Tejeda PT [facility]   2/4/2019  9:30 AM Fabien Tejeda PT [facility]   2/6/2019  9:00 AM Fabien Tejeda PT [facility]   2/6/2019  9:30 AM Fabien Tejeda PT [facility]   2/7/2019  9:30 AM Fabien Tejeda PT [facility]   2/7/2019 10:30 AM Fabien Tejeda PT [facility]

## 2019-02-01 NOTE — PROGRESS NOTES
PT DAILY TREATMENT NOTE 10-18    Patient Name: Malgorzata Barrera. Date:2019  : 1945  [x]  Patient  Verified  Payor: VA MEDICARE / Plan: VA MEDICARE PART A & B / Product Type: Medicare /    In time: 10:00  Out time: 10:26  Total Treatment Time (min): 26  Visit #: 8 of 16    Medicare/BCBS Only   Total Timed Codes (min):  26 1:1 Treatment Time:  26       Treatment Area: Dizziness and giddiness [R42]    SUBJECTIVE  Pain Level (0-10 scale): 0/10  Any medication changes, allergies to medications, adverse drug reactions, diagnosis change, or new procedure performed?: [x] No    [] Yes (see summary sheet for update)  Subjective functional status/changes:   [] No changes reported  Still positional change dizziness. OBJECTIVE    16 min Neuromuscular Re-education:  [x]  See flow sheet :   Rationale: improve coordination, improve balance and increase proprioception  to decrease risk for falls. 10 min Gait Trainin feet x 8 with no device on level surfaces with supervision level of assist: trunk turns, vertical head turns, tandem arms crossed looking forward, grapevine arms crossed looking forward. Rationale: With   [] TE   [] TA   [x] neuro   [] other: Patient Education: [x] Review HEP    [] Progressed/Changed HEP based on:   [] positioning   [] body mechanics   [] transfers   [] heat/ice application    [] other:      Other Objective/Functional Measures:   -pt performs balance challenges well: 0 LOB in dynamic gait activities  -LOB at 17sec on RLE SLS EO on foam  -LOB at 20sec on LLE SLS EO on foam   -LOB at 21sec with left foot back on foam EO  -LOB at 18sec with right foot back on foam EO  -LOB x 3 with cone taps (11-8 o'clock placement of 6 cones)    Pain Level (0-10 scale) post treatment: 0/10    ASSESSMENT/Changes in Function: 0/10  - pt demos good/fair balance through balance challenges.    - progression of cone tap exercise gives pt most difficulty with posterior & posterior contralateral placement. - pt able to perform ball toss ~30' off midline bilaterally on foam with romberg & MSR: however unable to complete in tandem. - Patient will continue to benefit from skilled PT services to modify and progress therapeutic interventions, address functional mobility deficits and address imbalance/dizziness to attain remaining goals. Progress towards goals / Updated goals:  1. Patient will improve FOTO score by 4 points in order to demonstrate a significant improvement in function. 2. Patient will report a 75% improvement in dizziness symptoms since [facility] in order to improve quality of life.     PLAN  [x]  Upgrade activities as tolerated     [x]  Continue plan of care  []  Update interventions per flow sheet       []  Discharge due to:_   []  Other:_      Milan Foreman, KOREY 2/1/2019  10:04 AM    Future Appointments   Date Time Provider Denisse Swan   2/4/2019  9:00 AM Evangelista Elizabeth, PT [facility]   2/4/2019  9:30 AM Evangelista Elizabeth, PT [facility]   2/6/2019  9:00 AM Evangelista Elizabeth, PT [facility]   2/6/2019  9:30 AM Evangelista Elizabeth, PT [facility]   2/7/2019  9:30 AM Evangelista lEizabeth, PT [facility]   2/7/2019 10:30 AM Evangelista Elizabeth, PT [facility]

## 2019-02-02 ENCOUNTER — APPOINTMENT (OUTPATIENT)
Dept: PHYSICAL THERAPY | Age: 74
End: 2019-02-02
Payer: MEDICARE

## 2019-02-04 ENCOUNTER — APPOINTMENT (OUTPATIENT)
Dept: PHYSICAL THERAPY | Age: 74
End: 2019-02-04
Payer: MEDICARE

## 2019-02-07 ENCOUNTER — HOSPITAL ENCOUNTER (OUTPATIENT)
Dept: PHYSICAL THERAPY | Age: 74
Discharge: HOME | End: 2019-02-07
Payer: MEDICARE

## 2019-02-07 PROCEDURE — 97112 NEUROMUSCULAR REEDUCATION: CPT

## 2019-02-07 PROCEDURE — 97116 GAIT TRAINING THERAPY: CPT

## 2019-02-07 PROCEDURE — 97110 THERAPEUTIC EXERCISES: CPT

## 2019-02-07 PROCEDURE — 97164 PT RE-EVAL EST PLAN CARE: CPT

## 2019-02-07 NOTE — PROGRESS NOTES
PT DAILY TREATMENT NOTE 10-18    Patient Name: Abdoul Thomas. Date:2019  : 1945  [x]  Patient  Verified  Payor: VA MEDICARE / Plan: VA MEDICARE PART A & B / Product Type: Medicare /    In time: 09:27  Out time: 10;04  Total Treatment Time (min): 37  Visit #: 6 of 16    Medicare/BCBS Only   Total Timed Codes (min):  37 1:1 Treatment Time:  37        Treatment Area: Pain in right shoulder [M25.511]    SUBJECTIVE  Pain Level (0-10 scale): 0/10  Any medication changes, allergies to medications, adverse drug reactions, diagnosis change, or new procedure performed?: [x] No    [] Yes (see summary sheet for update)  Subjective functional status/changes:   [] No changes reported  Went to work yesterday & driving was ok; did not do any lifting. OBJECTIVE    Modality rationale: decrease pain to improve the patients ability to return to ADLs with less pain. Type Additional Details   [x]  Ice     []  heat  []  Ice massage  []  Laser   []  Anodyne Position: sitting  Location: Right shoulder with cross body strap     12 min []Eval                  [x]Re-Eval       25 min Therapeutic Exercise:  [x] See flow sheet :   Rationale: increase ROM and increase strength to improve the patients ability to progress toward long term PT goals. With   [x] TE   [] TA   [] neuro   [] other: Patient Education: [x] Review HEP    [] Progressed/Changed HEP based on:   [] positioning   [] body mechanics   [] transfers   [] heat/ice application    [x] other: protocols: education on weight restrictions.       Other Objective/Functional Measures:   - pt able to perform TE today with mild c/o pain at end range that subsides within minutes  - pt able to perform clockwise & counterclockwise ball on wall with weighted ball of 500 grams (shoulder at ~70' flexion) with ease  - pt unable to control exercise ball on wall activity; exercise held     Pain Level (0-10 scale) post treatment: 0/10    ASSESSMENT/Changes in Function:    []  See Plan of Care  [x]  See progress note/recertification  []  See Discharge Summary         Progress towards goals / Updated goals:  See progress note    PLAN  [x]  Upgrade activities as tolerated     [x]  Continue plan of care  []  Update interventions per flow sheet       []  Discharge due to:_  []  Other:_      Valerie Eye, SPT 2/7/2019  8:56 AM    I was present during the entire treatment, directing and participating in the treatment.     Future Appointments   Date Time Provider Denisse Swan   2/7/2019  9:30 AM Zuly Jasso, PT [facility]   2/7/2019 10:30 AM Zuly Jasso PT [facility]

## 2019-02-07 NOTE — PROGRESS NOTES
[facility] - Demetrio Gann  [address] [facility]  [phone] fax    Continued Plan of Care/ Re-certification for Physical Therapy Services    Patient name: Adeola Silva Start of Care: 10.17.18   Referral source: Edmar Bateman, * : 1945   Medical/Treatment Diagnosis: Pain in right shoulder [M25.511]  Payor: Cyrus Silva / Plan: VA MEDICARE PART A & B / Product Type: Medicare /  Onset Date:18     Prior Hospitalization: see medical history Provider#: [id number]   Medications: Verified on Patient Summary List    Comorbidities: HTN, hearing impaired   Prior Level of Function:Ind with ADLs, right hand dominant, working, golf  Visits from Start of Care: 29    Missed Visits: 1    The Plan of Care and following information is based on the patient's current status:  Goal: Patient will improve right shoulder AROM flex to 120 degrees and abd to 90 degrees in order to increase ease of reaching into cabinets at home. Status at last note/certification: (63.11.00) Flexion = 119'. Abduction = 80'    Current Status:not met: progression: Flexion: 119. Abduction: 100     Goal: Patient will improve behind back reaching to L4 in order to increase ease of putting on his belt  Status at last note/certification: (45.70.77): right buttock  Current Status: not met: progressin19: belt line     Goal: Pt will demonstrate understanding/compliance with final HEP in order to allow pt to continue to progress independently upon DC. Status at last note/certification: N/A  Current Status: met: 19. Key functional changes:   Pt able to perform TE  with mild c/o pain at end range that subsides within minutes. Pt able to perform clockwise & counterclockwise ball on wall with weighted ball of 500 grams (shoulder at ~70' flexion) with ease.     Problems/ barriers to goal attainment: none     Problem List: decrease ROM, decrease strength, decrease ADL/ functional abilitiies, decrease activity tolerance and decrease flexibility/ joint mobility    Treatment Plan: Therapeutic exercise, Therapeutic activities, Neuromuscular re-education, Physical agent/modality, Manual therapy, Patient education and Self Care training     Patient Goal (s) has been updated and includes:   Able to do more overhead reaching with less pain & be able to put belt through belt loops while pants are on (not before I put my pants on). Goals for this certification period to be accomplished in 4 weeks:  1. Patient will improve right shoulder AROM flex to 120 degrees in order to increase ease of reaching into cabinets at home. 2. Patient will improve behind back reaching to L4 in order to increase ease of putting on his belt. Frequency / Duration: Patient to be seen 2 times per week for 4 weeks:    Assessment / Recommendations:  Pt is making slow & steady gains with skilled PT interventions. AROM in sitting flexion remains at 119' & abduction increased to 100' measured in sitting. Pt continues to report pain at end ranges & has yet to meet his goal of IR to L4 for ease of belting pants; this goal however is continuing to progress as pt today demos ability to reach his waste band with RUE. Pt also continuing to improve endurance of shoulder as evidenced by ability to perform ball on wall counterclockwise & clockwise with weighted ball & no c/o pain or fatigue. Pt remains unrealistic to return to PLOF: in assessment today pt answered that he would be able to carry a 100lb bag with RUE; SPT informed pt that this would be against protocols for his TRSA. Patient will continue to benefit from skilled PT services to modify and progress therapeutic interventions, address functional mobility deficits, address ROM deficits, address strength deficits, analyze and address soft tissue restrictions and analyze and modify body mechanics/ergonomics to attain remaining goals.     Certification Period: 02.06.19 - 03.07.19     KOREY Barriga  2/7/2019 8:57 AM    ________________________________________________________________________  I certify that the above Therapy Services are being furnished while the patient is under my care. I agree with the treatment plan and certify that this therapy is necessary. [] I have read the above and request that my patient continue as recommended.   [] I have read the above report and request that my patient continue therapy with the following changes/special instructions: _______________________________________  [] I have read the above report and request that my patient be discharged from therapy    Physician's Signature:____________Date:_________TIME:________    ** Signature, Date and Time must be completed for valid certification **    Please sign and return to [facility]  [address] [place] [address]  [phone] fax

## 2019-02-07 NOTE — PROGRESS NOTES
PT DAILY TREATMENT NOTE 10-18    Patient Name: Giovanni Mccarty. Date:2019  : 1945  [x]  Patient  Verified  Payor: VA MEDICARE / Plan: VA MEDICARE PART A & B / Product Type: Medicare /    In time: 09:00  Out time:09:26  Total Treatment Time (min): 26  Visit #: 10 of 16    Medicare/BCBS Only   Total Timed Codes (min):  26 1:1 Treatment Time:  23       Treatment Area: Dizziness and giddiness [R42]    SUBJECTIVE  Pain Level (0-10 scale): 0/10  Any medication changes, allergies to medications, adverse drug reactions, diagnosis change, or new procedure performed?: [x] No    [] Yes (see summary sheet for update)  Subjective functional status/changes:   [] No changes reported  Doing well; no dizziness lately     OBJECTIVE  16 min Neuromuscular Re-education: BOSU balance with wide MARIA TERESA. Romberg & tandem on foam EO + with ball toss outside midline. 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 10 min Gait Trainin' x 6 trials with no AD on level surfaces with small & tall hurdles with intermittent cone  from floor height & cognitive distractions: forward gait. Rationale: to improver pt ability to avoid trip hazards while ambulating home & community and  objects from floor height as needed for personal & professional duties.            With   [] TE   [] TA   [x] neuro   [] other: Patient Education: [x] Review HEP    [] Progressed/Changed HEP based on:   [] positioning   [x] body mechanics   [] transfers   [] heat/ice application    [] other:      Other Objective/Functional Measures:   - pt able to maintain wide MARIA TERESA on BOSU for 30' EO  - pt able to complete ball toss on foam with MSR; unable to complete in tandem   - pt demos circumductive gait on high hurdles until verbally cued and demonstration shown to correct   - pt unable to perform proprioceptive forward/backward/side<>side taps without UE support of // bars     Pain Level (0-10 scale) post treatment: 0/10    ASSESSMENT/Changes in Function:   Pt making steady progress in balance goals & responds well to challenges. Pt performed today's BOSU with wide MARIA TERESA EO well after utilizing UEs in // to achieve balance; then able to hold for 30'. Obstacle course with high & low hurdles as well as cone  from floor height to simulate home & work environment and tasks gave pt no dizzy symptoms or LOB; pt did require verbal cueing and demo to fully clear hurdles rather than utilize circumductive gait. Proprioceptive board provided pt with moderate to sever balance challenge: pt unable to control anterior<>postereior or side<>side tipping of board without UE support at // bars; this is an area to focus further balance goals as it provides a strong challenge for pt to utilize appropriate hip & ankle strategies. Patient will continue to benefit from skilled PT services to analyze and modify body mechanics/ergonomics and address imbalance/dizziness to attain remaining goals. Progress towards goals / Updated goals:  1. Patient will improve FOTO score by 4 points in order to demonstrate a significant improvement in function. 2. Patient will report a 75% improvement in dizziness symptoms since [facility] in order to improve quality of life    PLAN  [x]  Upgrade activities as tolerated     [x]  Continue plan of care  []  Update interventions per flow sheet       []  Discharge due to:_  []  Other:_      Fatimah Calles, KOREY 2/7/2019  8:52 AM    I was present during the entire treatment, directing and participating in the treatment.     Future Appointments   Date Time Provider Denisse Swan   2/7/2019  9:30 AM Catarino Johansen, PT [facility]   2/7/2019 10:30 AM AYAKA Harris [facility]

## 2019-02-09 ENCOUNTER — APPOINTMENT (OUTPATIENT)
Dept: PHYSICAL THERAPY | Age: 74
End: 2019-02-09
Payer: MEDICARE

## 2019-02-15 ENCOUNTER — HOSPITAL ENCOUNTER (OUTPATIENT)
Dept: PHYSICAL THERAPY | Age: 74
Discharge: HOME | End: 2019-02-15
Payer: MEDICARE

## 2019-02-15 PROCEDURE — 97110 THERAPEUTIC EXERCISES: CPT

## 2019-02-15 PROCEDURE — 97140 MANUAL THERAPY 1/> REGIONS: CPT

## 2019-02-15 NOTE — PROGRESS NOTES
PT DAILY TREATMENT NOTE 10-18    Patient Name: Manfred Lindsey. Date:2/15/2019  : 1945  [x]  Patient  Verified  Payor: Desmond Ma / Plan: [place] MED[place] & [place] / Product Type: Medicare /    In time: 10:00  Out time: 10:47  Total Treatment Time (min): 47  Visit #: 7 of 16    Medicare/BCBS Only   Total Timed Codes (min):  47 1:1 Treatment Time:  34       Treatment Area: Pain in right shoulder [M25.511]    SUBJECTIVE  Pain Level (0-10 scale): 0/10  Any medication changes, allergies to medications, adverse drug reactions, diagnosis change, or new procedure performed?: [x] No    [] Yes (see summary sheet for update)  Subjective functional status/changes:   [] No changes reported  Getting better; able to to drive ok but pulling the door closed is still painful deep to proximal bicep area. OBJECTIVE    Modality rationale: decrease pain to improve the patients ability to complete ADLs with less pain. Min Type Additional Details   10 [x]  Ice     []  heat  []  Ice massage  []  Laser   []  Anodyne Position: supine: head elevated 60'  Location: right shoulder      14 min Therapeutic Exercise:  [x] See flow sheet :   Rationale: increase strength to improve the patients ability to progress toward long term PT goals. 10 min Manual Therapy:  STM & TPR: right supraspinatus, UT, pec major & minor, proximal bicep    Rationale: decrease pain, increase ROM, increase tissue extensibility and decrease trigger points to increase functional ROM.            With   [x] TE   [] TA   [] neuro   [] other: Patient Education: [x] Review HEP    [] Progressed/Changed HEP based on:   [] positioning   [] body mechanics   [] transfers   [] heat/ice application    [] other:      Other Objective/Functional Measures:   - painful at ~75% abduction   - moderate tightness of right pec major/minor  - pertubation in supine      Pain Level (0-10 scale) post treatment: 0/10    ASSESSMENT/Changes in Function:   Pt making slow but steady progress toward goals. Increased endurance of shoulder musculature as evidenced by increased time of perturbations before c/o fatigue. Increase in functional mobility per pt report of increased ability to drive without pain, however still painful to reach & pull car door closed. Decreased TP in supraspinatus; pec minor & major remain tight, manual today to increase tissue extensibility. Patient will continue to benefit from skilled PT services to modify and progress therapeutic interventions, address functional mobility deficits, address ROM deficits, address strength deficits and analyze and address soft tissue restrictions to attain remaining goals. Progress towards goals / Updated goals:  Goals for this certification period to be accomplished in 4 weeks:  1. Patient will improve right shoulder AROM flex to 120 degrees in order to increase ease of reaching into cabinets at home. 2. Patient will improve behind back reaching to L4 in order to increase ease of putting on his belt.      PLAN  [x]  Upgrade activities as tolerated     [x]  Continue plan of care  []  Update interventions per flow sheet       []  Discharge due to:_  []  Other:_      Hardy Coronel, KOREY 2/15/2019  8:22 AM    Future Appointments   Date Time Provider Denisse Swan   2/15/2019 10:00 AM Radha Shrestha, PT [facility]

## 2019-02-21 ENCOUNTER — HOSPITAL ENCOUNTER (OUTPATIENT)
Dept: PHYSICAL THERAPY | Age: 74
Discharge: HOME | End: 2019-02-21
Payer: MEDICARE

## 2019-02-21 PROCEDURE — 97110 THERAPEUTIC EXERCISES: CPT

## 2019-02-21 NOTE — PROGRESS NOTES
PT DAILY TREATMENT NOTE 10-18    Patient Name: Roslyn Najjar. Date:2019  : 1945  [x]  Patient  Verified  Payor: Leonid Plan / Plan: [place] MEDICARE PART A & B / Product Type: Medicare /    In time: 11:30  Out time: 12:16  Total Treatment Time (min): 46  Visit #: 8 of 16    Medicare/BCBS Only   Total Timed Codes (min):  31 1:1 Treatment Time: 31        Treatment Area: Pain in right shoulder [M25.511]    SUBJECTIVE  Pain Level (0-10 scale):  0/10  Any medication changes, allergies to medications, adverse drug reactions, diagnosis change, or new procedure performed?: [x] No    [] Yes (see summary sheet for update)  Subjective functional status/changes:   [] No changes reported  Shoulder is doing good, no pain unless at end range. OBJECTIVE    Modality rationale: decrease pain to improve the patients ability to complete ADLs with decreased pain. Min Type Additional Details   10 [x]  Ice     []  heat  []  Ice massage  []  Laser   []  Anodyne Position: supine; head elevated 60'  Location: right shoulder    [] Skin assessment post-treatment:  []intact []redness- no adverse reaction    []redness - adverse reaction:     31 min Therapeutic Exercise:  [x] See flow sheet :   Rationale: increase ROM, increase strength and increase proprioception to improve the patients ability to increase functional use of RUE.           With   [x] TE   [] TA   [] neuro   [] other: Patient Education: [x] Review HEP    [] Progressed/Changed HEP based on:   [] positioning   [] body mechanics   [] transfers   [] heat/ice application    [x] other: DC at next visit      Other Objective/Functional Measures:   - pt able to tolerate increase in weight for TE with no increased c/o pain  - pt able to tolerate perturbations in standing with arm in 90 flexion & in 90 abduction with no c/o of pain or fatigue     Pain Level (0-10 scale) post treatment: 0/10    ASSESSMENT/Changes in Function:   Pt has progressed well toward long term PT goals. Pt verbalizes understanding of DC at next session. Pt verbalizes understanding of importance of continued stretching and strengthening of RUE per surgical protocols. Pt reports wanting to get back to \"lifting & working out to get shoulder back where it used to be. \" When asked if he was aware of his lifelong protocols, pt denies knowing he had a lifting restriction. SPT showed patient protocols from surgeon and pt verbalizes understanding. Patient will continue to benefit from skilled PT services to modify and progress therapeutic interventions, address functional mobility deficits, address ROM deficits, address strength deficits, analyze and cue movement patterns and analyze and modify body mechanics/ergonomics to attain remaining goals. Progress towards goals / Updated goals:  Goals for this certification period to be accomplished in 4 weeks:  1. Patient will improve right shoulder AROM flex to 120 degrees in order to increase ease of reaching into cabinets at home. 2. Patient will improve behind back reaching to L4 in order to increase ease of putting on his belt.     PLAN  [x]  Upgrade activities as tolerated     [x]  Continue plan of care  []  Update interventions per flow sheet       []  Discharge due to:_  []  Other:_      Lord Ivy, KOREY 2/21/2019  11:00 AM    I was present during the entire treatment, directing and participating in the treatment.    Robbi Cordova DPT      Future Appointments   Date Time Provider Denisse Swan   2/21/2019 11:30 AM Wesley Hogue PT [facility]   2/25/2019  9:00 AM Wesley Hogue PT [facility]

## 2019-02-25 ENCOUNTER — APPOINTMENT (OUTPATIENT)
Dept: PHYSICAL THERAPY | Age: 74
End: 2019-02-25
Payer: MEDICARE

## 2019-03-01 ENCOUNTER — HOSPITAL ENCOUNTER (OUTPATIENT)
Dept: PHYSICAL THERAPY | Age: 74
Discharge: HOME | End: 2019-03-01
Payer: MEDICARE

## 2019-03-01 PROCEDURE — 97110 THERAPEUTIC EXERCISES: CPT

## 2019-03-01 NOTE — PROGRESS NOTES
[facility]  [address] [facility]  [phone] fax    Physical Therapy Discharge Summary    Patient name: Radha Delaney Start of Care:  18   Referral source: Kenyetta Schwartz MD : 1945   Medical/Treatment Diagnosis: Dizziness and giddiness [R42]  Payor: VA MEDICARE / Plan: [place] MEDICARE PART A & B / Product Type: Medicare /  Onset Date: on and off for 10 years     Prior Hospitalization: see medical history Provider#: [id number]   Medications: Verified on Patient Summary List    Comorbidities:  Right TSA, HTN, hearing impaired  Prior Level of Function: Ind with ADLs    Visits from Start of Care: 10    Missed Visits: 0    Reporting Period : 19 to 19    Summary of Care:  Goal:  Patient will improve FOTO score by 4 points in order to demonstrate a significant improvement in function. Status at last note/certification:81  Status at discharge: not met    Goal: Patient will report a 75% improvement in dizziness symptoms since [facility] in order to improve quality of life. Status at last note/certification: n/a  Status at discharge: not met    Pt. Was seen for 2 visits following last progress note, then self D/C. He reports dizziness is better overall, but he continues to have dizziness with positional changes. He also demonstrated improving static standing balance Goals were unable to be re-assessed secondary to unplanned D/C.          ASSESSMENT/RECOMMENDATIONS:  [x]Discontinue therapy: [x]Patient has reached or is progressing toward set goals      []Patient is non-compliant or has abdicated      []Due to lack of appreciable progress towards set goals    Ana Maria Bailey, PT 3/1/2019 2:06 PM

## 2019-03-01 NOTE — PROGRESS NOTES
PT DAILY TREATMENT NOTE 10-18    Patient Name: Felipe Miller Date:3/1/2019  : 1945  [x]  Patient  Verified  Payor: VA MEDICARE / Plan: VA MEDICARE PART A & B / Product Type: Medicare /    In time: 9:45  Out time: 10:25  Total Treatment Time (min): 40  Visit #: 9 of 16    Medicare/BCBS Only   Total Timed Codes (min):  40 1:1 Treatment Time:  40       Treatment Area: Pain in right shoulder [M25.511]    SUBJECTIVE  Pain Level (0-10 scale):  0/10  Any medication changes, allergies to medications, adverse drug reactions, diagnosis change, or new procedure performed?: [x] No    [] Yes (see summary sheet for update)  Subjective functional status/changes:   [] No changes reported  Pt. Reports he has been doing his exercises at home which has been helping. He reports having some pain when he pulls up his pants    OBJECTIVE    40 min Therapeutic Exercise:  [x] See flow sheet :   Rationale: increase ROM and increase strength to improve the patients ability to increase ease of ADLs          With   [x] TE   [] TA   [] neuro   [] other: Patient Education: [x] Review HEP    [] Progressed/Changed HEP based on:   [] positioning   [] body mechanics   [] transfers   [] heat/ice application    [] other:      Other Objective/Functional Measures:   Right shoulder AROM flex: 120 degrees abd: 90 degrees  Right behind back reaching: right buttocks  Pt. Was re-educated on his HEP       Pain Level (0-10 scale) post treatment: 0/10    ASSESSMENT/Changes in Function:      []  See Plan of Care  []  See progress note/recertification  [x]  See Discharge Summary         Progress towards goals / Updated goals:  See D/C note    PLAN  []  Upgrade activities as tolerated     []  Continue plan of care  []  Update interventions per flow sheet       [x]  Discharge due to:_ progress towards goals.    []  Other:_      Keshav Clement, PT 3/1/2019  7:48 AM    Future Appointments   Date Time Provider Denisse Swan   3/1/2019 10:00 AM Andre Sotomayor, PT [facility]

## 2019-03-14 ENCOUNTER — APPOINTMENT (OUTPATIENT)
Dept: PHYSICAL THERAPY | Age: 74
End: 2019-03-14
Payer: MEDICARE

## 2019-03-14 ENCOUNTER — HOSPITAL ENCOUNTER (OUTPATIENT)
Dept: PHYSICAL THERAPY | Age: 74
Discharge: HOME | End: 2019-03-14
Payer: MEDICARE

## 2019-03-14 PROCEDURE — 97140 MANUAL THERAPY 1/> REGIONS: CPT

## 2019-03-14 PROCEDURE — 97161 PT EVAL LOW COMPLEX 20 MIN: CPT

## 2019-03-14 PROCEDURE — 97110 THERAPEUTIC EXERCISES: CPT

## 2019-03-14 NOTE — PROGRESS NOTES
[facility] [address]  [phone] fax    Plan of Care/ Statement of Necessity for Physical Therapy Services    Patient name: Beverly Hickey Start of Care: 3/14/2019   Referral source: Eliza Ríos, * : 1945    Medical Diagnosis: Pain in right shoulder [M25.511]  Payor: VA MEDICARE / Plan: VA MEDICARE PART A & B / Product Type: Medicare /  Onset Date: 2018    Treatment Diagnosis: right shoulder pain   Prior Hospitalization: see medical history Provider#: [id number]   Medications: Verified on Patient summary List    Comorbidities: HTN, hearing impaired   Prior Level of Function: right hand dominant, ind with ADLs      The Plan of Care and following information is based on the information from the initial evaluation. Assessment/ key information:  Pt. Is a 75year old male s/p right reverse TSA in 2018. He had PT after surgery and reports compliance with his HEP. Physician recommended continued PT in order to improve mobility. He reports pain and difficulty donning pants and reaching back to tuck in his shirt. He also has pain with moving his arm out to his side. The back swing motion in golf is painful nursing home up. He presents with decreased right shoulder AROM flexion: 122 degrees abduction: 80 degrees, ER at side: 17 degrees, and hand behind back reach to greater trochanter. Left shoulder PROM abduction is to 125 degrees ER: 20 degrees. He also has decreased right shoulder strength especially abduction and ER at 4-/5. Skilled PT is medically necessary in order to improve right shoulder mobility and strength for increased ease of getting dressed at home.      Evaluation Complexity History MEDIUM  Complexity : 1-2 comorbidities / personal factors will impact the outcome/ POC ; Examination MEDIUM Complexity : 3 Standardized tests and measures addressing body structure, function, activity limitation and / or participation in recreation  ;Presentation LOW Complexity : Stable, uncomplicated  ;Clinical Decision Making MEDIUM Complexity : FOTO score of 26-74  Overall Complexity Rating: LOW   Problem List: pain affecting function, decrease ROM, decrease strength, impaired gait/ balance, decrease ADL/ functional abilitiies, decrease activity tolerance, decrease flexibility/ joint mobility and decrease transfer abilities   Treatment Plan may include any combination of the following: Therapeutic exercise, Therapeutic activities, Neuromuscular re-education, Physical agent/modality, Gait/balance training, Manual therapy, Patient education, Self Care training and Functional mobility training  Patient / Family readiness to learn indicated by: asking questions  Persons(s) to be included in education: patient (P)  Barriers to Learning/Limitations: None  Patient Goal (s): to move shoulder better  Patient Self Reported Health Status: good  Rehabilitation Potential: fair    Short Term Goals: To be accomplished in 1 weeks:  1. Patient will demonstrate compliance with HEP in order to improve right shoulder mobility    Long Term Goals: To be accomplished in 10 treatments:  1. Patient will improve FOTO score by 4 points in order demonstrate a significant improvement in function. 2. Patient will improve ER at side AROM to 30 degrees in order to increase ease of getting dressed. 3. Patient will improve right shoulder abduction AROM to 100 degrees in order to increase ease of putting on deodorant. 4. Patient will improve right shoulder abduction/ER strength to 4/5 in order to increase ease of ADLs. Frequency / Duration: Patient to be seen 2 times per week for 10 treatments.     Patient/ Caregiver education and instruction: Diagnosis, prognosis, exercises   [x]  Plan of care has been reviewed with PTA    Certification Period: 3/14/19-5/12/19  Betzaida Abdul, PT 3/14/2019 12:02 PM    ________________________________________________________________________    I certify that the above Therapy Services are being furnished while the patient is under my care. I agree with the treatment plan and certify that this therapy is necessary.     Physician's Signature:____________Date:_________TIME:________    ** Signature, Date and Time must be completed for valid certification **    Please sign and return to [facility]  [address] [place] [address]  [phone] fax

## 2019-03-14 NOTE — PROGRESS NOTES
PT DAILY TREATMENT NOTE/SHOULDER EVAL 10-18    Patient Name: Reji Adrian. Date:3/14/2019  : 1945  [x]  Patient  Verified  Payor: Yane Plain / Plan: VA MEDICARE PART A & B / Product Type: Medicare /    In time: 11:00  Out time: 11:53  Total Treatment Time (min): 53  Visit #: 1 of 10    Medicare/BCBS Only   Total Timed Codes (min):  25 1:1 Treatment Time:  53       Treatment Area: Pain in right shoulder [M25.511]    SUBJECTIVE  Pain Level (0-10 scale):  0/10  []constant []intermittent []improving []worsening []no change since onset    Any medication changes, allergies to medications, adverse drug reactions, diagnosis change, or new procedure performed?: [x] No    [] Yes (see summary sheet for update)  Subjective functional status/changes:       Mechanism of Injury:  Right TSA. Saw doctor recently and wants to work on motion some more. Pain with moving shoulder certain ways. Unable to play golf. Back swing with wedge caused pain. Pain moving arm out to the side. Pain with pulling pants up and tuck shirt in.      OBJECTIVE/EXAMINATION    10 min Therapeutic Exercise:  [x] See flow sheet :   Rationale: increase ROM and increase strength to improve the patients ability to increase ease of ADLs    15 min Manual Therapy:  Trigger point release and STM to pec major and infraspinatus   Rationale: decrease pain, increase ROM and increase tissue extensibility to increase ease of ADLs          With   [x] TE   [] TA   [] neuro   [] other: Patient Education: [x] Review HEP    [] Progressed/Changed HEP based on:   [] positioning   [] body mechanics   [] transfers   [] heat/ice application    [] other:      Physical Therapy Evaluation - Shoulder    Posture: [] Poor    [x] Fair    [] Good    Describe: fwd shoulder    ROM:  [] Unable to assess at this time                                           AROM                                                              PROM   Left Right  Left Right   Flexion  122 Flexion 126   Extension   Extension     Scaption/ABD  80 Scaptin/ABD  125   ER @ 0 Degrees  17 ER @ 0 Degrees     ER @ 90 Degrees   ER @ 90 Degrees  20   Behind back reaching  Greater trochanter  IR @ 90 Degrees  24     End Feel / Painful Arc:    Strength:   [] Unable to assess at this time                                                                            L (1-5) R (1-5) Pain   Flexors 5 4 [] Yes   [] No   Abductors 5 4- [] Yes   [] No   External Rotators 5 4- [] Yes   [] No   Internal Rotators 5 4+ [] Yes   [] No   Supraspinatus   [] Yes   [] No   Serratus Anterior   [] Yes   [] No   Lower Trapezius   [] Yes   [] No   Elbow Flexion 5 5 [] Yes   [] No   Elbow Extension 5 5 [] Yes   [] No     Palpation  [] Min  [x] Mod  [] Severe    Location: pec major  [] Min  [] Mod  [] Severe    Location:  [] Min  [] Mod  [] Severe    Location:    Other Tests / Comments:        Pain Level (0-10 scale) post treatment: 0/10    ASSESSMENT/Changes in Function:     [x]  See Plan of Care  []  See progress note/recertification  []  See Discharge Summary         Progress towards goals / Updated goals:  See POC    PLAN  []  Upgrade activities as tolerated     [x]  Continue plan of care  []  Update interventions per flow sheet       []  Discharge due to:_  []  Other:_      Tyrel Enciso, PT 3/14/2019  11:02 AM

## 2019-03-19 ENCOUNTER — HOSPITAL ENCOUNTER (OUTPATIENT)
Dept: PHYSICAL THERAPY | Age: 74
Discharge: HOME | End: 2019-03-19
Payer: MEDICARE

## 2019-03-19 PROCEDURE — 97110 THERAPEUTIC EXERCISES: CPT

## 2019-03-19 NOTE — PROGRESS NOTES
PT DAILY TREATMENT NOTE 10-18    Patient Name: Radha Lugo. Date:3/19/2019  : 1945  [x]  Patient  Verified  Payor: Curly Beatty / Plan: VA MEDICARE PART A & B / Product Type: Medicare /    In time:10:30  Out time:10:59  Total Treatment Time (min): 29  Visit #: 2 of 10    Medicare/BCBS Only   Total Timed Codes (min):  29 1:1 Treatment Time:  23       Treatment Area: Pain in right shoulder [M25.511]    SUBJECTIVE  Pain Level (0-10 scale): 0/10  Any medication changes, allergies to medications, adverse drug reactions, diagnosis change, or new procedure performed?: [x] No    [] Yes (see summary sheet for update)  Subjective functional status/changes:   [] No changes reported  Pt reports that he was not able to return to his old job because he couldn't do the lifting required. He is doing his exercises at home, but not as much as he should. He is doing his HEP ~1x per week. He is trying to get work as a  right now. He still has a lot of pain with reaching to close the car door. OBJECTIVE      29 min Therapeutic Exercise:  [x] See flow sheet :   Rationale: increase ROM and increase strength to improve the patients ability to improve ease of reaching with ADLs        With   [] TE   [] TA   [] neuro   [] other: Patient Education: [x] Review HEP    [] Progressed/Changed HEP based on:   [] positioning   [] body mechanics   [] transfers   [] heat/ice application    [] other:      Other Objective/Functional Measures:     Pain with wall push up plus, educated to perform in partial range and pt was able to complete without pain    Pain with abduction wall slide at ~100 dgrs, changed to scaption d/t pain with abduction, able to complete in scaption without pain  Challenged with final reps of standing flexion/scaption/abduction AROM  Decreased range evident with no money on right     Pain Level (0-10 scale) post treatment: 0/10    ASSESSMENT/Changes in Function:     Initiated treatment per POC.   Pt was motivated in therapy and put forth good interventions with therex. Pt reports partial compliance with HEP and was educated regarding importance of HEP compliance to optimize therapy outcomes. Will continue to address strength and ROM deficits of right shoulder in order to increase functional use of right UE. Patient will continue to benefit from skilled PT services to modify and progress therapeutic interventions, address ROM deficits, address strength deficits, analyze and address soft tissue restrictions, analyze and cue movement patterns, assess and modify postural abnormalities and instruct in home and community integration to attain remaining goals. Progress towards goals / Updated goals:  Short Term Goals: To be accomplished in 1 weeks:  1. Patient will demonstrate compliance with HEP in order to improve right shoulder mobility Progressing. Pt reports partial compliance 3/19/19      Long Term Goals: To be accomplished in 10 treatments:  1. Patient will improve FOTO score by 4 points in order demonstrate a significant improvement in function. 2. Patient will improve ER at side AROM to 30 degrees in order to increase ease of getting dressed. 3. Patient will improve right shoulder abduction AROM to 100 degrees in order to increase ease of putting on deodorant. 4. Patient will improve right shoulder abduction/ER strength to 4/5 in order to increase ease of ADLs.      PLAN  [x]  Upgrade activities as tolerated     [x]  Continue plan of care  []  Update interventions per flow sheet       []  Discharge due to:_  []  Other:_      Kina Machado PT 3/19/2019  10:27 AM    Future Appointments   Date Time Provider Denisse Swan   3/19/2019 10:30 AM Lucia Briscoe PT [facility]   3/25/2019 11:00 AM Lucia Briscoe PT [facility]   4/3/2019  9:00 AM Connie Bueno PT [facility]

## 2019-03-25 ENCOUNTER — APPOINTMENT (OUTPATIENT)
Dept: PHYSICAL THERAPY | Age: 74
End: 2019-03-25
Payer: MEDICARE

## 2019-04-02 ENCOUNTER — HOSPITAL ENCOUNTER (OUTPATIENT)
Dept: PHYSICAL THERAPY | Age: 74
Discharge: HOME | End: 2019-04-02
Payer: MEDICARE

## 2019-04-02 PROCEDURE — 97110 THERAPEUTIC EXERCISES: CPT

## 2019-04-02 NOTE — PROGRESS NOTES
PT DAILY TREATMENT NOTE 10-18    Patient Name: Yin Sotelo. Date:2019  : 1945  [x]  Patient  Verified  Payor: [place] MEDICARE / Plan: [place] MEDICARE PART A & B / Product Type: Medicare /    In time:8:00  Out time:8:24  Total Treatment Time (min): 24  Visit #: 3 of 10    Medicare/BCBS Only   Total Timed Codes (min):  24 1:1 Treatment Time:  17       Treatment Area: Pain in right shoulder [M25.511]    SUBJECTIVE  Pain Level (0-10 scale): 0/10  Any medication changes, allergies to medications, adverse drug reactions, diagnosis change, or new procedure performed?: [x] No    [] Yes (see summary sheet for update)  Subjective functional status/changes:   [] No changes reported  Pt reports HEP compliance. He believes his shoulder is getting better. He has the most pain with functional IR. OBJECTIVE      24 min Therapeutic Exercise:  [x] See flow sheet :   Rationale: increase ROM and increase strength to improve the patients ability to improve ease of dressing      With   [] TE   [] TA   [] neuro   [] other: Patient Education: [x] Review HEP    [] Progressed/Changed HEP based on:   [] positioning   [] body mechanics   [] transfers   [] heat/ice application    [] other:      Other Objective/Functional Measures:     Pain in anterior shoulder with sidelying ER that subsided immediately upon ceasing, pt requested to stop after 7 reps d/t pain, no increased pain following completion     No increased pain following session     Pain Level (0-10 scale) post treatment: 0/10    ASSESSMENT/Changes in Function:     Pt is making slow progress towards initial goals in therapy. He reports compliance with HEP  Right shoulder strength/ROM continue to be limited but is slowly improving, evidenced by improving ease with interventions. Will continue to address strength and ROM deficits for increased functional use of right UE.      Patient will continue to benefit from skilled PT services to modify and progress therapeutic interventions, address ROM deficits, address strength deficits, analyze and address soft tissue restrictions, analyze and cue movement patterns, analyze and modify body mechanics/ergonomics, assess and modify postural abnormalities and instruct in home and community integration to attain remaining goals. Progress towards goals / Updated goals:  Short Term Goals: To be accomplished in 1 weeks:  1. Patient will demonstrate compliance with HEP in order to improve right shoulder mobility Goal met. Pt reports compliance 4/2/19     Long Term Goals: To be accomplished in 10 treatments:  1. Patient will improve FOTO score by 4 points in order demonstrate a significant improvement in function. 2. Patient will improve ER at side AROM to 30 degrees in order to increase ease of getting dressed. 3. Patient will improve right shoulder abduction AROM to 100 degrees in order to increase ease of putting on deodorant. 4. Patient will improve right shoulder abduction/ER strength to 4/5 in order to increase ease of ADLs.      PLAN  []  Upgrade activities as tolerated     [x]  Continue plan of care  []  Update interventions per flow sheet       []  Discharge due to:_  []  Other:_      Jacqueline Lam, PT 4/2/2019  7:47 AM    Future Appointments   Date Time Provider Denisse Swan   4/2/2019  8:00 AM Polo Louis PT [facility]   4/3/2019  9:00 AM Luther Steele PT [facility]   4/8/2019  3:00 PM Polo Louis PT [facility]   4/11/2019  8:30 AM Luther Steele PT [facility]

## 2019-04-03 ENCOUNTER — HOSPITAL ENCOUNTER (OUTPATIENT)
Dept: PHYSICAL THERAPY | Age: 74
Discharge: HOME | End: 2019-04-03
Payer: MEDICARE

## 2019-04-03 PROCEDURE — 97110 THERAPEUTIC EXERCISES: CPT

## 2019-04-03 NOTE — PROGRESS NOTES
PT DAILY TREATMENT NOTE 10-18    Patient Name: Cesar . Date:4/3/2019  : 1945  [x]  Patient  Verified  Payor: [place] MEDICARE / Plan: [place] MEDICARE PART A & B / Product Type: Medicare /    In time: 8:49  Out time: 9:32  Total Treatment Time (min): 43  Visit #: 4 of 10    Medicare/BCBS Only   Total Timed Codes (min):  33 1:1 Treatment Time:  23       Treatment Area: Pain in right shoulder [M25.511]    SUBJECTIVE  Pain Level (0-10 scale):  0/10  Any medication changes, allergies to medications, adverse drug reactions, diagnosis change, or new procedure performed?: [x] No    [] Yes (see summary sheet for update)  Subjective functional status/changes:   [] No changes reported  Pt.  Reports he continues to have pain reaching behind his back and putting on his pants    OBJECTIVE    Modality rationale: decrease pain to improve the patients ability to increase ease of ADLs   Min Type Additional Details    [] Estim:  []Unatt       []IFC  []Premod                        []Other:  []w/ice   []w/heat  Position:  Location:    [] Estim: []Att    []TENS instruct  []NMES                    []Other:  []w/US   []w/ice   []w/heat  Position:  Location:    []  Traction: [] Cervical       []Lumbar                       [] Prone          []Supine                       []Intermittent   []Continuous Lbs:  [] before manual  [] after manual    []  Ultrasound: []Continuous   [] Pulsed                           []1MHz   []3MHz W/cm2:  Location:    []  Iontophoresis with dexamethasone         Location: [] Take home patch   [] In clinic   10 []  Ice     [x]  heat  []  Ice massage  []  Laser   []  Anodyne Position: seated  Location: right shoulder    []  Laser with stim  []  Other:  Position:  Location:    []  Vasopneumatic Device Pressure:       [] lo [] med [] hi   Temperature: [] lo [] med [] hi   [x] Skin assessment post-treatment:  [x]intact []redness- no adverse reaction    []redness - adverse reaction:     33 min Therapeutic Exercise:  [x] See flow sheet :   Rationale: increase ROM and increase strength to improve the patients ability to increase ease of ADLs          With   [x] TE   [] TA   [] neuro   [] other: Patient Education: [x] Review HEP    [] Progressed/Changed HEP based on:   [] positioning   [] body mechanics   [] transfers   [] heat/ice application    [] other:      Other Objective/Functional Measures:   Right shoulder abduction AROM: less than 90 degrees  Pt. Was challenged with 12 reps of scaption on wall wipes  He tolerated other therex well with no reports of increased pain  He has poor behind back reaching mobility    Pain Level (0-10 scale) post treatment: 0/10    ASSESSMENT/Changes in Function:  Pt. Is making limited progress towards goals. He continues to have decreased abduction mobility and decreased shoulder strength. Pt. Also continues to have increased pain with donning pants. Patient will continue to benefit from skilled PT services to modify and progress therapeutic interventions, address functional mobility deficits, address ROM deficits, address strength deficits, analyze and address soft tissue restrictions, analyze and cue movement patterns, analyze and modify body mechanics/ergonomics and assess and modify postural abnormalities to attain remaining goals. Progress towards goals / Updated goals:  Short Term Goals: To be accomplished in 1 weeks:  1. Patient will demonstrate compliance with HEP in order to improve right shoulder mobility Goal met. Pt reports compliance 4/2/19     Long Term Goals: To be accomplished in 10 treatments:  1. Patient will improve FOTO score by 4 points in order demonstrate a significant improvement in function. 2. Patient will improve ER at side AROM to 30 degrees in order to increase ease of getting dressed. 3. Patient will improve right shoulder abduction AROM to 100 degrees in order to increase ease of putting on deodorant.    Not met: less than 90 degrees (4/3/19)  4. Patient will improve right shoulder abduction/ER strength to 4/5 in order to increase ease of ADLs.      PLAN  []  Upgrade activities as tolerated     [x]  Continue plan of care  []  Update interventions per flow sheet       []  Discharge due to:_  []  Other:_      Adri Stewart, PT 4/3/2019  8:47 AM    Future Appointments   Date Time Provider Denisse Swan   4/3/2019  9:00 AM Luther Steele, PT [facility]   4/8/2019  3:00 PM Polo Louis, AYAKA [facility]   4/11/2019  8:30 AM Luther Steele, PT [facility]

## 2019-04-11 ENCOUNTER — HOSPITAL ENCOUNTER (OUTPATIENT)
Dept: PHYSICAL THERAPY | Age: 74
Discharge: HOME | End: 2019-04-11
Payer: MEDICARE

## 2019-04-11 PROCEDURE — 97110 THERAPEUTIC EXERCISES: CPT

## 2019-04-11 NOTE — PROGRESS NOTES
PT DAILY TREATMENT NOTE 10-18    Patient Name: Ofe Lira. Date:2019  : 1945  [x]  Patient  Verified  Payor: Omayra Quintana / Plan: [place] MEDICARE PART A & B / Product Type: Medicare /    In time:8:31  Out time:9:12  Total Treatment Time (min): 41   Visit #: 5 of 10    Medicare/BCBS Only   Total Timed Codes (min):  31 1:1 Treatment Time:  29       Treatment Area: Pain in right shoulder [M25.511]    SUBJECTIVE  Pain Level (0-10 scale): 0/10  Any medication changes, allergies to medications, adverse drug reactions, diagnosis change, or new procedure performed?: [x] No    [] Yes (see summary sheet for update)  Subjective functional status/changes:   [] No changes reported  Pt reports 85-90% improvement with therapy,  His greatest remaining complaints are putting his pants on and putting his loop in the back belt loop. OBJECTIVE      31 min Therapeutic Exercise:  [x] See flow sheet :   Rationale: increase ROM and increase strength to improve the patients ability to improve ease of reaching with dressing and daily tasks             With   [] TE   [] TA   [] neuro   [] other: Patient Education: [x] Review HEP    [] Progressed/Changed HEP based on:   [] positioning   [] body mechanics   [] transfers   [] heat/ice application    [] other:      Other Objective/Functional Measures:     Right Shoulder AROM:  Abduction: 110 dgrs  ER: 35 dgrs      Right Shoulder MMT  Abduction: 4+/5  ER: 4+/5     Functional ER  Left: T1  Right Occiput    Functional IR  Left: T7  Right: Right PSIS     Functional IR: L4 after IR towel stretch    FOTO 71    Discomfort with wall push up plus and IR towel stretch, pt instructed to perform in pain-free range, discomfort subsided immediately upon ceasing.     No pain following session    Pain Level (0-10 scale) post treatment: 0/10    ASSESSMENT/Changes in Function: See Progress Note    Patient will continue to benefit from skilled PT services to modify and progress therapeutic interventions, address ROM deficits, address strength deficits, analyze and address soft tissue restrictions, analyze and cue movement patterns, assess and modify postural abnormalities and instruct in home and community integration to attain remaining goals.      []  See Plan of Care  [x]  See progress note/recertification  []  See Discharge Summary         Progress towards goals / Updated goals:  See Progress Note    PLAN  []  Upgrade activities as tolerated     []  Continue plan of care  []  Update interventions per flow sheet       []  Discharge due to:_  []  Other:_      Stefany Addi, PT 4/11/2019  8:40 AM    Future Appointments   Date Time Provider Denisse Swan   4/15/2019  3:00 PM Dyllan Weaver, PT [facility]   4/19/2019  4:00 PM Dyllan Weaver, PT [facility]   4/25/2019  8:30 AM Dyllan Weaver, PT [facility]   4/26/2019  4:30 PM Dyllan Weaver, PT [facility]   4/29/2019 11:30 AM Dyllan Weaver, PT [facility]   5/3/2019  4:00 PM Dyllan Weaver, PT [facility]   5/6/2019 11:30 AM Dyllan Weaver, PT [facility]   5/10/2019 10:30 AM Dyllan Weaver, PT [facility]

## 2019-04-12 NOTE — PROGRESS NOTES
[facility] [place] [employer]  [place]  [address] [employer] [facility]  [phone] fax    Medicare Progress Report    Patient name: Mary Espinal Start of Care: 3/14/19   Referral source: Tereza Torres, * : 1945   Medical/Treatment Diagnosis: Pain in right shoulder [M25.511]  Payor: Katia Dear / Plan: [place] MEDICARE PART A & B / Product Type: Medicare /  Onset Date:2018     Prior Hospitalization: see medical history Provider#: [id number]   Medications: Verified on Patient Summary List    Comorbidities: HTN, hearing impaired   Prior Level of Function: right hand dominant, ind with ADLs             Visits from Start of Care:5    Missed Visits: 2    Reporting Period: 3/14/19 to 19    Subjective Reports: Reports 85-90% improvement     Current Status/ treatment goals Objective measures   1. Patient will demonstrate compliance with HEP in order to improve right shoulder mobility   [x] met                 [] not met  [] progressing Goal met. Pt reports compliance    2. Patient will improve FOTO score by 4 points in order demonstrate a significant improvement in function. [] met                 [x] not met  [] progressing Not met. Declined 1 point    3. Patient will improve ER at side AROM to 30 degrees in order to increase ease of getting dressed. [x] met                 [] not met  [] progressing Goal met. 35 dgrs    4. Patient will improve right shoulder abduction AROM to 100 degrees in order to increase ease of putting on deodorant. [x] met                 [] not met  [] progressing Goal met. 110 dgrs    5. Patient will improve right shoulder abduction/ER strength to 4/5 in order to increase ease of ADLs.  [x] met                 [] not met  [] progressing Goal met.    Right Shoulder MMT   (in available range):    Abduction: 4+/5  ER: 4+/5          Key functional changes: improving ROM, improving strength       Problems/ barriers to goal attainment: none    Assessment / Recommendations: Pt is making steady progress in therapy, meeting 4/5 initial goals. FOTO score has declined 1 point despite subjective reports of 85-90% improvement. Pt reports his greatest remaining functional limitations is difficulty pulling up his pants and looping his belt in the back. Functional IR of right shoulder is limited to right PSIS, however, improved to L4 with functional IR towel stretch this visit. Pt will benefit from continued skilled PT to address remaining strength, ROM, and flexibility deficits in order to increase functional use of right UE. Anticipated transition towards final HEP and DC within the next 5 visits. Problem List: pain affecting function, decrease ROM, decrease strength, decrease ADL/ functional abilitiies, decrease activity tolerance and decrease flexibility/ joint mobility   Treatment Plan: Therapeutic exercise, Therapeutic activities, Neuromuscular re-education, Physical agent/modality, Gait/balance training, Manual therapy, Patient education, Self Care training, Functional mobility training and Home safety training    Patient Goal (s) has been updated and includes: be able to pull up his pants and put his belt on more easily     Updated Goals to be accomplished in 5 treatments:  1. Pt will improve right shoulder functional IR to L3 in order to improve ease of donning pants. 2. Patient will improve FOTO score by 4 points in order demonstrate a significant improvement in function.    3. Pt will demonstrate understanding/compliance with final HEP in order to allow for continued progression independently following DC     Frequency / Duration: Patient to be seen 2-3 times per week for 5 treatments      Ezequiel Valderrama, PT 4/12/2019 10:17 AM

## 2019-04-15 ENCOUNTER — HOSPITAL ENCOUNTER (OUTPATIENT)
Dept: PHYSICAL THERAPY | Age: 74
Discharge: HOME | End: 2019-04-15
Payer: MEDICARE

## 2019-04-15 PROCEDURE — 97110 THERAPEUTIC EXERCISES: CPT

## 2019-04-15 NOTE — PROGRESS NOTES
PT DAILY TREATMENT NOTE 10-18    Patient Name: Anna Marie Plummer. Date:4/15/2019  : 1945  [x]  Patient  Verified  Payor: Yunior Boyle / Plan: [place] MEDICARE PART A & B / Product Type: Medicare /    In time: 5:29  Out time: 6:10  Total Treatment Time (min): 41  Visit #: 6 of 10    Medicare/BCBS Only   Total Timed Codes (min):  31 1:1 Treatment Time:  25       Treatment Area: Pain in right shoulder [M25.511]    SUBJECTIVE  Pain Level (0-10 scale):  0/10  Any medication changes, allergies to medications, adverse drug reactions, diagnosis change, or new procedure performed?: [x] No    [] Yes (see summary sheet for update)  Subjective functional status/changes:   [] No changes reported  Pt. Reports he is doing pretty good today. He continues to have pain when he moves his shoulder certain ways.      OBJECTIVE    Modality rationale: decrease pain and increase tissue extensibility to improve the patients ability to increase ease of aDls   Min Type Additional Details    [] Estim:  []Unatt       []IFC  []Premod                        []Other:  []w/ice   []w/heat  Position:  Location:    [] Estim: []Att    []TENS instruct  []NMES                    []Other:  []w/US   []w/ice   []w/heat  Position:  Location:    []  Traction: [] Cervical       []Lumbar                       [] Prone          []Supine                       []Intermittent   []Continuous Lbs:  [] before manual  [] after manual    []  Ultrasound: []Continuous   [] Pulsed                           []1MHz   []3MHz W/cm2:  Location:    []  Iontophoresis with dexamethasone         Location: [] Take home patch   [] In clinic   10 []  Ice     [x]  heat  []  Ice massage  []  Laser   []  Anodyne Position: seated  Location: right shoulder    []  Laser with stim  []  Other:  Position:  Location:    []  Vasopneumatic Device Pressure:       [] lo [] med [] hi   Temperature: [] lo [] med [] hi   [x] Skin assessment post-treatment:  [x]intact []redness- no adverse reaction    []redness - adverse reaction:     31 min Therapeutic Exercise:  [x] See flow sheet :   Rationale: increase ROM and increase strength to improve the patients ability to increase ease of ADLs          With   [x] TE   [] TA   [] neuro   [] other: Patient Education: [x] Review HEP    [] Progressed/Changed HEP based on:   [] positioning   [] body mechanics   [] transfers   [] heat/ice application    [] other:      Other Objective/Functional Measures:   Pt. Has limited range with right shoulder abduction  He tolerated PT well with no reports of increased pain except end range abduction, IR, and ER  He was educated on updated HEP     Pain Level (0-10 scale) post treatment:  0/10     ASSESSMENT/Changes in Function:  Pt. Is progressing slowly towards goals. He continues to demonstrate decreased right shoulder mobility especially with abduction and behind back reaching. He also continues to demonstrate decreased right shoudler strength. Patient will continue to benefit from skilled PT services to modify and progress therapeutic interventions, address functional mobility deficits, address ROM deficits, address strength deficits, analyze and address soft tissue restrictions, analyze and cue movement patterns, analyze and modify body mechanics/ergonomics and assess and modify postural abnormalities to attain remaining goals. Progress towards goals / Updated goals:  1. Pt will improve right shoulder functional IR to L3 in order to improve ease of donning pants. 2. Patient will improve FOTO score by 4 points in order demonstrate a significant improvement in function.    3. Pt will demonstrate understanding/compliance with final HEP in order to allow for continued progression independently following DC   Issued HEP (4/15/19)    PLAN  []  Upgrade activities as tolerated     [x]  Continue plan of care  []  Update interventions per flow sheet       []  Discharge due to:_  []  Other:_      Dayana Hernandez PT 4/15/2019  11:49 AM    Future Appointments   Date Time Provider Denisse Sosa   4/15/2019  3:00 PM Hazle Bass, PT [facility]   4/19/2019  4:00 PM [facility] Bass, [facility]   4/25/2019  8:30 AM Hazle Bass, PT [facility]   4/26/2019  4:30 PM Hazle Bass, PT [facility]   4/29/2019 11:30 AM Hazle Bass, PT [facility]   5/3/2019  4:00 PM Hazle Bass, PT [facility]   5/6/2019 11:30 AM Hazle Bass, PT [facility]   5/10/2019 10:30 AM Hazle Bass, PT [facility]

## 2019-04-19 ENCOUNTER — HOSPITAL ENCOUNTER (OUTPATIENT)
Dept: PHYSICAL THERAPY | Age: 74
Discharge: HOME | End: 2019-04-19
Payer: MEDICARE

## 2019-04-19 PROCEDURE — 97110 THERAPEUTIC EXERCISES: CPT

## 2019-04-19 NOTE — PROGRESS NOTES
PT DAILY TREATMENT NOTE 10-18    Patient Name: Branden Roth. Date:2019  : 1945  [x]  Patient  Verified  Payor: Laya Kenney / Plan: [place] MED[place] & B / Product Type: Medicare /    In time: 3:47  Out time: 4:30  Total Treatment Time (min): 43  Visit #: 7 of 10    Medicare/BCBS Only   Total Timed Codes (min):  33 1:1 Treatment Time:  33       Treatment Area: Pain in right shoulder [M25.511]    SUBJECTIVE  Pain Level (0-10 scale):  0/10  Any medication changes, allergies to medications, adverse drug reactions, diagnosis change, or new procedure performed?: [x] No    [] Yes (see summary sheet for update)  Subjective functional status/changes:   [] No changes reported  Pt. Reports he is doing good today. He reports it's a little easier to pull up his pants.      OBJECTIVE    Modality rationale: decrease inflammation and increase tissue extensibility to improve the patients ability to increase ease of ADLs   Min Type Additional Details    [] Estim:  []Unatt       []IFC  []Premod                        []Other:  []w/ice   []w/heat  Position:  Location:    [] Estim: []Att    []TENS instruct  []NMES                    []Other:  []w/US   []w/ice   []w/heat  Position:  Location:    []  Traction: [] Cervical       []Lumbar                       [] Prone          []Supine                       []Intermittent   []Continuous Lbs:  [] before manual  [] after manual    []  Ultrasound: []Continuous   [] Pulsed                           []1MHz   []3MHz W/cm2:  Location:    []  Iontophoresis with dexamethasone         Location: [] Take home patch   [] In clinic   10 []  Ice     [x]  heat  []  Ice massage  []  Laser   []  Anodyne Position: seated  Location: right shoulder     []  Laser with stim  []  Other:  Position:  Location:    []  Vasopneumatic Device Pressure:       [] lo [] med [] hi   Temperature: [] lo [] med [] hi   [x] Skin assessment post-treatment:  [x]intact []redness- no adverse reaction []redness - adverse reaction:     33 min Therapeutic Exercise:  [x] See flow sheet :   Rationale: increase ROM and increase strength to improve the patients ability to increase ease of ADLs          With   [x] TE   [] TA   [] neuro   [] other: Patient Education: [x] Review HEP    [] Progressed/Changed HEP based on:   [] positioning   [] body mechanics   [] transfers   [] heat/ice application    [] other:      Other Objective/Functional Measures:   Pt. Continues to demonstrate decreased right shoulder abduction mobility   Pt. Was challenged with increased reps during therex  He was educated on plan to D/C in 2 visits and he is agreeable to plan    Pain Level (0-10 scale) post treatment:  0/10    ASSESSMENT/Changes in Function:  Pt. Is progressing slowly towards goals. He continues to have decreased right shoulder AROM and strength but has some improvement with pulling up his pants at home. Patient will continue to benefit from skilled PT services to modify and progress therapeutic interventions, address functional mobility deficits, address ROM deficits, address strength deficits, analyze and address soft tissue restrictions, analyze and cue movement patterns, analyze and modify body mechanics/ergonomics and assess and modify postural abnormalities to attain remaining goals. Progress towards goals / Updated goals:  1. Pt will improve right shoulder functional IR to L3 in order to improve ease of donning pants.   2. Patient will improve FOTO score by 4 points in order demonstrate a significant improvement in function.   3. Pt will demonstrate understanding/compliance with final HEP in order to allow for continued progression independently following DC   Issued HEP (4/15/19)    PLAN  []  Upgrade activities as tolerated     [x]  Continue plan of care  []  Update interventions per flow sheet       []  Discharge due to:_  []  Other:_      Kath Rodriguez, PT 4/19/2019  3:52 PM    Future Appointments   Date Time Provider Denisse Swan   4/19/2019  4:00 PM Vinh Alvarez, PT [facility]   4/25/2019  8:30 AM Vinh Alvarez, PT [facility]   4/26/2019  4:30 PM Vinh Alvarez, PT [id number]   4/29/2019 11:30 AM Vinh Alvarez, PT [facility]   5/3/2019  4:00 PM Vinh Alvarez, PT [facility]   5/6/2019 11:30 AM Vinh Alvarez, PT [facility]   5/10/2019 10:30 AM Vinh Alvarez, PT [facility]

## 2019-04-26 ENCOUNTER — HOSPITAL ENCOUNTER (OUTPATIENT)
Dept: PHYSICAL THERAPY | Age: 74
Discharge: HOME | End: 2019-04-26
Payer: MEDICARE

## 2019-04-26 PROCEDURE — 97110 THERAPEUTIC EXERCISES: CPT

## 2019-04-26 NOTE — PROGRESS NOTES
PT DAILY TREATMENT NOTE 10-18    Patient Name: Myrtle Zaman. Date:2019  : 1945  [x]  Patient  Verified  Payor: Chan Anne / Plan: [place] MEDICARE PART A & B / Product Type: Medicare /    In time: 4:31  Out time: 5:08  Total Treatment Time (min): 37  Visit #: 8 of 10    Medicare/BCBS Only   Total Timed Codes (min):  27 1:1 Treatment Time:  27       Treatment Area: Pain in right shoulder [M25.511]    SUBJECTIVE  Pain Level (0-10 scale): 0/10  Any medication changes, allergies to medications, adverse drug reactions, diagnosis change, or new procedure performed?: [x] No    [] Yes (see summary sheet for update)  Subjective functional status/changes:   [] No changes reported  Pt. Reports he is doing ok today.  He reports having some improvement with putting on his pants    OBJECTIVE    Modality rationale: decrease pain and increase tissue extensibility to improve the patients ability to increase ease of ADLs   Min Type Additional Details    [] Estim:  []Unatt       []IFC  []Premod                        []Other:  []w/ice   []w/heat  Position:  Location:    [] Estim: []Att    []TENS instruct  []NMES                    []Other:  []w/US   []w/ice   []w/heat  Position:  Location:    []  Traction: [] Cervical       []Lumbar                       [] Prone          []Supine                       []Intermittent   []Continuous Lbs:  [] before manual  [] after manual    []  Ultrasound: []Continuous   [] Pulsed                           []1MHz   []3MHz W/cm2:  Location:    []  Iontophoresis with dexamethasone         Location: [] Take home patch   [] In clinic   10 []  Ice     [x]  heat  []  Ice massage  []  Laser   []  Anodyne Position: seated  Location: right shoulder    []  Laser with stim  []  Other:  Position:  Location:    []  Vasopneumatic Device Pressure:       [] lo [] med [] hi   Temperature: [] lo [] med [] hi   [x] Skin assessment post-treatment:  [x]intact []redness- no adverse reaction    []redness - adverse reaction:     27 min Therapeutic Exercise:  [x] See flow sheet :   Rationale: increase ROM and increase strength to improve the patients ability to increase ease of ADLs          With   [x] TE   [] TA   [] neuro   [] other: Patient Education: [x] Review HEP    [] Progressed/Changed HEP based on:   [] positioning   [] body mechanics   [] transfers   [] heat/ice application    [] other:      Other Objective/Functional Measures:   Pt. Tolerated increased resistance during therex with no increase in pain and good form  He had mild pain during a well push up  Pt. Demonstrates good abduction mobility during side lying abduction     Pain Level (0-10 scale) post treatment: 0/10    ASSESSMENT/Changes in Function:  Pt. Is progressing with physical therapy. He demonstrates improving strength and improving right shoulder mobility. Plan is to D/C next visit. Patient will continue to benefit from skilled PT services to modify and progress therapeutic interventions, address functional mobility deficits, address ROM deficits, address strength deficits, analyze and address soft tissue restrictions, analyze and cue movement patterns, analyze and modify body mechanics/ergonomics and assess and modify postural abnormalities to attain remaining goals. Progress towards goals / Updated goals:  1. Pt will improve right shoulder functional IR to L3 in order to improve ease of donning pants.   2. Patient will improve FOTO score by 4 points in order demonstrate a significant improvement in function.   3. Pt will demonstrate understanding/compliance with final HEP in order to allow for continued progression independently following DC   Met (4/26/19)     PLAN  []  Upgrade activities as tolerated     [x]  Continue plan of care  []  Update interventions per flow sheet       []  Discharge due to:_  []  Other:_      Real Ascencio, PT 4/26/2019  1:44 PM    Future Appointments   Date Time Provider Denisse Swan 4/26/2019  4:30 PM Skyla Reaves, PT [facility]   5/1/2019  3:00 PM Skyla Reaves, PT [facility]   5/3/2019  4:00 PM Skyla Reaves, PT [facility]   5/8/2019  4:00 PM Skyla Reaves, PT [id number]   5/10/2019  5:30 PM Skyla Reaves, PT [facility]

## 2019-05-01 ENCOUNTER — APPOINTMENT (OUTPATIENT)
Dept: PHYSICAL THERAPY | Age: 74
End: 2019-05-01
Payer: MEDICARE

## 2019-05-03 ENCOUNTER — HOSPITAL ENCOUNTER (OUTPATIENT)
Dept: PHYSICAL THERAPY | Age: 74
Discharge: HOME | End: 2019-05-03
Payer: MEDICARE

## 2019-05-03 PROCEDURE — 97110 THERAPEUTIC EXERCISES: CPT

## 2019-05-03 NOTE — PROGRESS NOTES
PT DAILY TREATMENT NOTE 10-18    Patient Name: Nohemi Morales. Date:5/3/2019  : 1945  [x]  Patient  Verified  Payor: [place] MEDICARE / Plan: [place] MEDICARE PART A & B / Product Type: Medicare /    In time: 4:02  Out time: 4:40  Total Treatment Time (min): 38  Visit #: 9 of 10    Medicare/BCBS Only   Total Timed Codes (min):  28 1:1 Treatment Time:  28       Treatment Area: Pain in right shoulder [M25.511]    SUBJECTIVE  Pain Level (0-10 scale): 0/10  Any medication changes, allergies to medications, adverse drug reactions, diagnosis change, or new procedure performed?: [x] No    [] Yes (see summary sheet for update)  Subjective functional status/changes:   [] No changes reported  Pt. Reports he is tired today from [place]. He reports no pain with driving. He reports increased ease of putting on his pants but continues to have difficulty with reaching behind her back.      OBJECTIVE    Modality rationale: decrease pain and increase tissue extensibility to improve the patients ability to increase ease of ADLs   Min Type Additional Details    [] Estim:  []Unatt       []IFC  []Premod                        []Other:  []w/ice   []w/heat  Position:  Location:    [] Estim: []Att    []TENS instruct  []NMES                    []Other:  []w/US   []w/ice   []w/heat  Position:  Location:    []  Traction: [] Cervical       []Lumbar                       [] Prone          []Supine                       []Intermittent   []Continuous Lbs:  [] before manual  [] after manual    []  Ultrasound: []Continuous   [] Pulsed                           []1MHz   []3MHz W/cm2:  Location:    []  Iontophoresis with dexamethasone         Location: [] Take home patch   [] In clinic   10 []  Ice     [x]  heat  []  Ice massage  []  Laser   []  Anodyne Position: seated  Location: right shoulder    []  Laser with stim  []  Other:  Position:  Location:    []  Vasopneumatic Device Pressure:       [] lo [] med [] hi   Temperature: [] lo [] med [] hi   [x] Skin assessment post-treatment:  [x]intact []redness- no adverse reaction    []redness - adverse reaction:     28 min Therapeutic Exercise:  [x] See flow sheet :   Rationale: increase ROM and increase strength to improve the patients ability to increase ease of ADLs          With   [x] TE   [] TA   [] neuro   [] other: Patient Education: [x] Review HEP    [] Progressed/Changed HEP based on:   [] positioning   [] body mechanics   [] transfers   [] heat/ice application    [] other:      Other Objective/Functional Measures:   Behind back reaching: right SIJ  FOTO:  61 points  Pt.  Demonstrated good understanding of his HEP    Pain Level (0-10 scale) post treatment:  0/10    ASSESSMENT/Changes in Function:      []  See Plan of Care  [x]  See progress note/recertification  []  See Discharge Summary         Progress towards goals / Updated goals:  See progress note    PLAN  []  Upgrade activities as tolerated     [x]  Continue plan of care  []  Update interventions per flow sheet       []  Discharge due to:_  []  Other:_      Alireza Owens PT 5/3/2019  2:50 PM    Future Appointments   Date Time Provider Denisse Swan   5/3/2019  4:00 PM Jamil Hernandez PT [facility]   5/8/2019  4:00 PM Jamil Hernandez PT [facility]   5/10/2019  5:30 PM Jamil Hernandez PT [facility]

## 2019-05-03 NOTE — PROGRESS NOTES
[facility] - [facility] [employer]  [place]  [address] [place] [address]  [phone] fax    Physical Therapy Discharge Summary    Patient name: Janel Rendon Start of Care:  3/14/19   Referral source: Mabel Palomo, * : 1945   Medical/Treatment Diagnosis: Pain in right shoulder [M25.511]  Payor: Mia Bella / Plan: [place] MEDICARE PART A & B / Product Type: Medicare /  Onset Date: 2018     Prior Hospitalization: see medical history Provider#: [id number]   Medications: Verified on Patient Summary List    Comorbidities:  HTN, hearing impaired  Prior Level of Function: right hand dominant, Ind with ADLs    Visits from Start of Care: 9    Missed Visits: 3    Reporting Period : 19 to 5/3/19    Summary of Care:  Goal: Pt will improve right shoulder functional IR to L3 in order to improve ease of donning pants. Status at last note/certification: L4  Status at discharge: not met    Goal: Patient will improve FOTO score by 4 points in order demonstrate a significant improvement in function. Status at last note/certification: 72  Status at discharge: not met    Goal: Pt will demonstrate understanding/compliance with final HEP in order to allow for continued progression independently following DC   Status at last note/certification: n/a  Status at discharge: met    Pt. Progressed with physical therapy. He is having less pain and he has increased ease of donning his pants. He continues to have difficulty with reaching behind his back getting to SIJ. FOTO score had no significant change at 61 points. He was educated on a HEP in order to continue to improve strength and mobility following D/C.        ASSESSMENT/RECOMMENDATIONS:  [x]Discontinue therapy: [x]Patient has reached or is progressing toward set goals      []Patient is non-compliant or has abdicated      []Due to lack of appreciable progress towards set goals    Camden Espinoza, PT 5/3/2019 2:51 PM

## 2019-05-06 ENCOUNTER — APPOINTMENT (OUTPATIENT)
Dept: PHYSICAL THERAPY | Age: 74
End: 2019-05-06
Payer: MEDICARE

## 2019-05-08 ENCOUNTER — APPOINTMENT (OUTPATIENT)
Dept: PHYSICAL THERAPY | Age: 74
End: 2019-05-08
Payer: MEDICARE

## 2019-05-10 ENCOUNTER — APPOINTMENT (OUTPATIENT)
Dept: PHYSICAL THERAPY | Age: 74
End: 2019-05-10
Payer: MEDICARE

## 2020-06-08 ENCOUNTER — HOSPITAL ENCOUNTER (OUTPATIENT)
Dept: PHYSICAL THERAPY | Age: 75
Discharge: HOME | End: 2020-06-08
Payer: MEDICARE

## 2020-06-08 PROCEDURE — 97162 PT EVAL MOD COMPLEX 30 MIN: CPT

## 2020-06-08 PROCEDURE — 97530 THERAPEUTIC ACTIVITIES: CPT

## 2020-06-08 NOTE — PROGRESS NOTES
PT DAILY TREATMENT NOTE/KNEE EVAL 10-18    Patient Name: Bernardo Turpin. Date:2020  : 1945  [x]  Patient  Verified  Payor: VA MEDICARE / Plan: VA MEDICARE PART A & B / Product Type: Medicare /    In time:11:28  Out time:11:45  Total Treatment Time (min): 17  Visit #: 1 of 12    Medicare/BCBS Only   Total Timed Codes (min):  11 1:1 Treatment Time:  8     Treatment Area: Pain in right knee [M25.561]  Pain in left knee [M25.562]    SUBJECTIVE  Pain Level (0-10 scale): 5/10  []constant []intermittent []improving []worsening []no change since onset    Any medication changes, allergies to medications, adverse drug reactions, diagnosis change, or new procedure performed?: [x] No    [] Yes (see summary sheet for update)  Subjective functional status/changes:       Barriers: []pain []financial []time []transportation []other  Motivation: high   Substance use: []Alcohol []Tobacco []other:   FABQ Score: []low []elevate  Cognition: A & O x 3    Other: Not oriented to time     OBJECTIVE/EXAMINATION    11 min []Eval                  []Re-Eval     8 min Therapeutic Activity:  []  See flow sheet : patient education   Rationale:  To ensure patient understanding to maximize pt safety and therapy outcomes         With   [] TE   [x] TA   [] neuro   [] other: Patient Education: [x] Review HEP    [] Progressed/Changed HEP based on:   [] positioning   [] body mechanics   [] transfers   [] heat/ice application    [x] other: reviewed signs/sx of infection/DVT and call MD/go to emergency room immediately if signs/sx, reviewed HEP from home health, reviewed long sit hamstring stretch to add to HEP, reviewed importance of avoiding placing pillow under knees, reviewed positioning with wife with BLE elevated at rest and heel prop when resting in recliner        Other Objective/Functional Measures:     /78 taken manually prior to therapy     Physical Therapy Evaluation - Knee      Gait:  [] Normal    [] Abnormal    [] Antalgic    [] NWB    Device: RW    Describe: decreased timothy, shuffling gait pattern, increased flexion over RW, decreased heel strike/early toe off B     ROM / Strength  [] Unable to assess                  AROM                   Strength (1-5)    Left Right Left Right   Hip Flexion   4 4    Extension        Abduction        Adduction       Knee Flexion 73 82      Extension 10 13 3+ 3+   Ankle Plantarflexion   3+ 3+    Dorsiflexion   4 4       Flexibility: [] Unable to assess at this time  Hamstrings:    (L) Tightness= [] WNL   [] Min   [] Mod   [x] Severe    (R) Tightness= [] WNL   [] Min   [] Mod   [x] Severe  Quadriceps:    (L) Tightness= [] WNL   [] Min   [] Mod   [x] Severe    (R) Tightness= [] WNL   [] Min   [] Mod   [x] Severe  :    Palpation: Mild tenderness left vastus lateralis     Optional Tests:  Patellar Mobility: Not assessed d/t time constraint      Girth Measurements: Not formally assessed d/t time constraint, swelling evident at joint line B, no swelling B calves     Other tests/comments:  No redness, pitting edema, tenderness, or increased temp B calves  No findings indicative of DVT using Well's Clinical Prediction Rules    Incisions are healing without signs of complication     Verbally reviewed HEP from home health  Added long sit HS stretch 2 x 30\" to current HEP    No increased pain following sesion     Further evaluation/treatment limited d/t time constraint  Session started 28 minutes late d/t waiting for order to be faxed from MD         Pain Level (0-10 scale) post treatment: 5/10     ASSESSMENT/Changes in Function: See POC    Patient will continue to benefit from skilled PT services to modify and progress therapeutic interventions, address functional mobility deficits, address ROM deficits, address strength deficits, analyze and address soft tissue restrictions, analyze and cue movement patterns, analyze and modify body mechanics/ergonomics, assess and modify postural abnormalities, address imbalance/dizziness and instruct in home and community integration to attain remaining goals.      [x]  See Plan of Care  []  See progress note/recertification  []  See Discharge Summary         Progress towards goals / Updated goals:  See POC    PLAN  [x]  Upgrade activities as tolerated     []  Continue plan of care  [x]  Update interventions per flow sheet       []  Discharge due to:_  []  Other:_      Gautam Seen, PT 6/8/2020  10:55 AM

## 2020-06-08 NOTE — PROGRESS NOTES
[facility] [employer] [facility]  [address] [place] [address]  [phone] fax    Plan of Care/ Statement of Necessity for Physical Therapy Services    Patient name: Jani Wallace Start of Care: 2020   Referral source: Rome Chadwick, * : 1945    Medical Diagnosis: Pain in right knee [M25.561]  Pain in left knee [M25.562]  Payor: VA MEDICARE / Plan: VA MEDICARE PART A & B / Product Type: Medicare /  Onset Date: Date of Surgery 20    Treatment Diagnosis: B Knee Pain, Gait    Prior Hospitalization: see medical history Provider#: [id number]   Medications: Verified on Patient summary List    Comorbidities: arthritis, HTN, hearing impairment, visual impairment (glasses)    Prior Level of Function: lives with wife in a 1 story home, Ind with ADLs and household tasks, Ind with ambulation        The Plan of Care and following information is based on the information from the initial evaluation. Assessment/ key information: Pt is a 76 yo M who presents s/p B TKA 20. He had home health therapy prior to presenting to outpatient. Pt ambulates with RW, demonstrating decreased timothy, shuffling gait pattern, increased flexion over RW, and decreased heel strike/early toe off B. Incisions are healing without signs of complication. Significant strength and ROM deficits are evident B knees, and lack of terminal knee extension B is likely contributing to altered gait pattern. Pt will benefit from skilled PT to address strength, ROM, flexibility, and functional mobility deficits in order to improve ease of ambulation/ADLs and allow for return to PLOF.       Evaluation Complexity History HIGH Complexity :3+ comorbidities / personal factors will impact the outcome/ POC ; Examination MEDIUM Complexity : 3 Standardized tests and measures addressing body structure, function, activity limitation and / or participation in recreation  ;Presentation MEDIUM Complexity : Evolving with changing characteristics  ; Clinical Decision Making MEDIUM Complexity : FOTO score of 26-74  Overall Complexity Rating: MEDIUM  Problem List: pain affecting function, decrease ROM, decrease strength, edema affecting function, impaired gait/ balance, decrease ADL/ functional abilitiies, decrease activity tolerance, decrease flexibility/ joint mobility and decrease transfer abilities   Treatment Plan may include any combination of the following: Therapeutic exercise, Therapeutic activities, Neuromuscular re-education, Physical agent/modality, Gait/balance training, Manual therapy, Patient education, Self Care training, Functional mobility training, Home safety training and Stair training  Patient / Family readiness to learn indicated by: asking questions, trying to perform skills and interest  Persons(s) to be included in education: patient (P) and family support person (FSP);list wife  Barriers to Learning/Limitations: None  Patient Goal (s): Bend/straighten, walk without a limp or assistance  Patient Self Reported Health Status: good  Rehabilitation Potential: good    Short Term Goals: To be accomplished in 1 weeks:  1. Pt will be compliant with initial HEP in order to optimize therapy outcomes. Long Term Goals: To be accomplished in 4 weeks:  1. Pt will improve FOTO by points in order to demonstrate functional improvement. 2.  Pt will report 50% overall improvement since start of care in order to improve QOL. 3.  Pt will improve B knee AROM to 5-90* in order to normalize gait pattern and improve ease of stairs. 4.  Pt will improve B knee strength to 4/5 in order to improve ease of prolonged ambulation and stairs. Frequency / Duration: Patient to be seen 2-3  times per week for 4 weeks.     Patient/ Caregiver education and instruction: Diagnosis, prognosis, exercises   [x]  Plan of care has been reviewed with PTA    Certification Period: 6/8/20 to 7/7/20   Los Reed, PT 6/8/2020 10:53 AM    ________________________________________________________________________    I certify that the above Therapy Services are being furnished while the patient is under my care. I agree with the treatment plan and certify that this therapy is necessary.     Physician's Signature:____________Date:_________TIME:________    ** Signature, Date and Time must be completed for valid certification **    Please sign and return to [facility]  [employer]  [address] [place] [address] [facility]  [phone] fax

## 2020-06-10 ENCOUNTER — HOSPITAL ENCOUNTER (OUTPATIENT)
Dept: PHYSICAL THERAPY | Age: 75
Discharge: HOME | End: 2020-06-10
Payer: MEDICARE

## 2020-06-10 PROCEDURE — 97530 THERAPEUTIC ACTIVITIES: CPT

## 2020-06-10 PROCEDURE — 97110 THERAPEUTIC EXERCISES: CPT

## 2020-06-10 PROCEDURE — 97140 MANUAL THERAPY 1/> REGIONS: CPT

## 2020-06-10 NOTE — PROGRESS NOTES
PT DAILY TREATMENT NOTE 10-18    Patient Name: Harinder Pillai. Date:6/10/2020  : 1945  [x]  Patient  Verified  Payor: [employer]ia Members / Plan: [place] MEDICARE PART A & B / Product Type: Medicare /    In time:2:48  Out time:3:33  Total Treatment Time (min): 45  Visit #: 2 of 12    Medicare/BCBS Only   Total Timed Codes (min):  45 1:1 Treatment Time:  43       Treatment Area: Pain in right knee [M25.561]  Pain in left knee [M25.562]    SUBJECTIVE  Pain Level (0-10 scale): 3-4/10  Any medication changes, allergies to medications, adverse drug reactions, diagnosis change, or new procedure performed?: [x] No    [] Yes (see summary sheet for update)  Subjective functional status/changes:   [] No changes reported  Pt reports that he has had some shortness of breath at times when he walks since the surgery. He denies chest pain, shortness of breath at rest, or any other abnormal symptoms. Pt's wife reports pt is scheduled to follow up with PCP 20. OBJECTIVE      22 min Therapeutic Exercise:  [] See flow sheet :   Rationale: increase ROM and increase strength to improve the patients ability to improve ease of ambulation and ADLs    15 min Therapeutic Activity:  []  See flow sheet : monitoring vitals, education for wife and pt   Rationale:  To optimize patient safety     8 min Manual Therapy:  Grade 3 superior/inferior and medial/lateral patellar mobs B    Rationale: decrease pain, increase ROM and increase tissue extensibility to improve knee mobility to normalize gait             With   [] TE   [] TA   [] neuro   [] other: Patient Education: [x] Review HEP    [] Progressed/Changed HEP based on:   [] positioning   [] body mechanics   [] transfers   [] heat/ice application    [x] other: reviewed abnormal vitals at rest and with response to exercise with pt and wife, advised pt and wife to call PCP this afternoon to see if PCP wanted to keep appointment on 20 or see pt earlier       Other Objective/Functional Measures:     O2 97-98%   /104, heart rate 108bpm taken electronically at rest prior to therapy   Heart rate 92bpm taken manually at rest prior to therapy, irregularity noted with timothy with palpation of pulse    Hypomobility B patella  Knee AROM following manual:  Left 5-66*  Right 8-65*    Shortness of breath reported with walking across therapy gym  Attempted to assess pulse ox but pulse ox could not obtain reading  Seated rest break until pt declined shortness of breath (approximately 1 minute)  Stood to perform Romberg and pt reported SOB    Incisions healing without signs of infection  No redness, tenderness, or pitting edema B calves    /74, heart rate 111bpm taken electronically  /76 taken manually     Pain Level (0-10 scale) post treatment: 4/10 with standing, 0/10 rest     ASSESSMENT/Changes in Function:     Initiated treatment per POC. Pt was motivated in therapy and put forth good effort with interventions. He reported  shortness of breath for the first time to therapist this session, reporting this has been occurring since surgery. Denied chest pain or any abnormal symptoms. Resting heart rate is elevated and diastolic BP decreased [id number] with low exertion. Advised pt and wife to call PCP today regarding abnormal vitals and SOB. Pt and wife verbalized compliance. Will maintain low cardiac exertion with therapy interventions until pt is assessed for SOB. Will continue to address strength, ROM, flexibility, joint mobility, balance, and functional mobility deficits in order to improve ease of ambulation and daily activities.         Patient will continue to benefit from skilled PT services to modify and progress therapeutic interventions, address functional mobility deficits, address ROM deficits, address strength deficits, analyze and address soft tissue restrictions, analyze and cue movement patterns, analyze and modify body mechanics/ergonomics, assess and modify postural abnormalities, address imbalance/dizziness and instruct in home and community integration to attain remaining goals. Progress towards goals / Updated goals:  Short Term Goals: To be accomplished in 1 weeks:  1. Pt will be compliant with initial HEP in order to optimize therapy outcomes. Goal met. 6/10/20   Long Term Goals: To be accomplished in 4 weeks:  1. Pt will improve FOTO by points in order to demonstrate functional improvement. 2.  Pt will report 50% overall improvement since start of care in order to improve QOL. 3.  Pt will improve B knee AROM to 5-90* in order to normalize gait pattern and improve ease of stairs. 4.  Pt will improve B knee strength to 4/5 in order to improve ease of prolonged ambulation and stairs.     PLAN  []  Upgrade activities as tolerated     [x]  Continue plan of care  []  Update interventions per flow sheet       []  Discharge due to:_  []  Other:_      Pete Briceño, PT 6/10/2020  2:50 PM    Future Appointments   Date Time Provider Denisse Swan   6/15/2020 11:00 AM Brent Gonsalez, PT [facility]   6/18/2020 11:00 AM Leanna Arcos, PT [facility]   6/22/2020  8:45 AM Justice Erazo, PT [facility]   6/25/2020  9:30 AM Leanna Arcos, PT [facility]   6/29/2020  8:45 AM Opal Guevara, PT [facility]   7/2/2020  8:45 AM Justice Erazo, PT [facility]

## 2020-06-15 ENCOUNTER — HOSPITAL ENCOUNTER (OUTPATIENT)
Dept: PHYSICAL THERAPY | Age: 75
End: 2020-06-15
Payer: MEDICARE

## 2020-06-18 ENCOUNTER — APPOINTMENT (OUTPATIENT)
Dept: PHYSICAL THERAPY | Age: 75
End: 2020-06-18
Payer: MEDICARE

## 2020-06-19 ENCOUNTER — HOSPITAL ENCOUNTER (OUTPATIENT)
Dept: PHYSICAL THERAPY | Age: 75
Discharge: HOME | End: 2020-06-19
Payer: MEDICARE

## 2020-06-19 PROCEDURE — 97161 PT EVAL LOW COMPLEX 20 MIN: CPT

## 2020-06-19 PROCEDURE — 97110 THERAPEUTIC EXERCISES: CPT

## 2020-06-19 PROCEDURE — 97140 MANUAL THERAPY 1/> REGIONS: CPT

## 2020-06-19 NOTE — PROGRESS NOTES
[facility]  Maisha Mena  [address] [facility]  [phone] fax    Plan of Care/ Statement of Necessity for Physical Therapy Services    Patient name: Katelynn Young Start of Care: 2020   Referral source: Konrad Coleman, * : 1945    Medical Diagnosis: Left knee pain [M25.562]  Pain in right knee [M25.561]  Payor: [place] MEDICARE / Plan: [place] MEDICARE PART A & B / Product Type: Medicare /  Onset Date: B TKA on 20. Manipulation 20    Treatment Diagnosis: B knee pain   Prior Hospitalization: see medical history Provider#: [id number]   Medications: Verified on Patient summary List     Comorbidities: arthritis, HTN, hearing impairment, visual impairment (glasses)    Prior Level of Function: lives with wife in a 1 story home, Ind with ADLs and household tasks, Ind with ambulation        The Plan of Care and following information is based on the information from the initial evaluation. Assessment/ key information:  Pt. Is a 75year old male s/p B knee manipulation on 20. He had B TKA on 20 and continued to have significant decrease in mobility. He presents with decreased B knee AROM, left: 10-95 degrees right: 20-80 degrees. He also has decreased B knee strength secondary to pain. Decreased quad activation during quad sets. He ambulates with RW with B decreased step length, decreased heel strike, and decreased knee flexion. Gait speed was 0.315m/s. He also presents with decreased hamstring flexibility and patella hypomobility. Skilled PT is medically necessary in order to improve B knee mobility and strength for increased ease of ambulation and improved quality of life.      Evaluation Complexity History MEDIUM  Complexity : 1-2 comorbidities / personal factors will impact the outcome/ POC ; Examination MEDIUM Complexity : 3 Standardized tests and measures addressing body structure, function, activity limitation and / or participation in recreation  ;Presentation LOW Complexity : Stable, uncomplicated  ;Clinical Decision Making MEDIUM Complexity : FOTO score of 26-74  Overall Complexity Rating: LOW   Problem List: pain affecting function, decrease ROM, decrease strength, edema affecting function, impaired gait/ balance, decrease ADL/ functional abilitiies, decrease activity tolerance, decrease flexibility/ joint mobility and decrease transfer abilities   Treatment Plan may include any combination of the following: Therapeutic exercise, Therapeutic activities, Neuromuscular re-education, Physical agent/modality, Gait/balance training, Manual therapy, Patient education, Self Care training, Functional mobility training and Home safety training  Patient / Family readiness to learn indicated by: asking questions  Persons(s) to be included in education: patient (P)  Barriers to Learning/Limitations: None  Patient Goal (s): to walk better  Patient Self Reported Health Status: good  Rehabilitation Potential: good    Short Term Goals: To be accomplished in 1 weeks:  1. Patient will demonstrate compliance with HEP in order to improve B knee AROM for increased ease of ambulation. Long Term Goals: To be accomplished in 4 weeks:  1. Patient will improve FOTO score by 10 points in order to demonstrate a significant improvement in function. 2. Patient will improve B knee AROM 3-110 degrees in order to increase ease of ambulation. 3. Patient will improve B knee strength to 5/5 in order to increase ease of stair negotiation. 4. Patient will improve gait speed during 10m walk test to 0.6m/s in order to increase ease of community ambulation. Frequency / Duration: Patient to be seen 3 times per week for 4 weeks.     Patient/ Caregiver education and instruction: Diagnosis, prognosis, exercises   [x]  Plan of care has been reviewed with PTA    Certification Period: 6/19/20-7/18/20    Nolia Buerger, PT 6/19/2020 12:55 PM    ________________________________________________________________________    I certify that the above Therapy Services are being furnished while the patient is under my care. I agree with the treatment plan and certify that this therapy is necessary.     Physician's Signature:____________Date:_________TIME:________    ** Signature, Date and Time must be completed for valid certification **    Please sign and return to [facility]  [facility]  [address] [place] [address]  [phone] fax

## 2020-06-19 NOTE — PROGRESS NOTES
PT DAILY TREATMENT NOTE/KNEE EVAL 10-18    Patient Name: Antonia Rahman. Date:2020  : 1945  [x]  Patient  Verified  Payor: Saad Keep / Plan: [place] MEDICARE PART A & B / Product Type: Medicare /    In time: 11:44  Out time: 12:30  Total Treatment Time (min): 55  Visit #: 1 of 12    Medicare/BCBS Only   Total Timed Codes (min):  23 1:1 Treatment Time:  46     Treatment Area: Left knee pain [M25.562]  Pain in right knee [M25.561]    SUBJECTIVE  Pain Level (0-10 scale): 1/10  []constant []intermittent []improving []worsening []no change since onset    Any medication changes, allergies to medications, adverse drug reactions, diagnosis change, or new procedure performed?: [x] No    [] Yes (see summary sheet for update)  Subjective functional status/changes:       Mechanism of Injury:  B TKA. Yesterday had a manipulation. Reports has improved since manipulation. Hasn't done many exercises at home.  He followed up with doctor about his heart rate and BP and is scheduled to have testing done  Pt Goals: to walk better    OBJECTIVE/EXAMINATION      13 min Therapeutic Exercise:  [x] See flow sheet :   Rationale: increase ROM and increase strength to improve the patients ability to increase ease of ADLs    8 min Manual Therapy:  Patella mobs bilaterally   Rationale: decrease pain, increase ROM and increase tissue extensibility to increase ease of ambulation           With   [x] TE   [] TA   [] neuro   [] other: Patient Education: [x] Review HEP    [] Progressed/Changed HEP based on:   [] positioning   [] body mechanics   [] transfers   [] heat/ice application    [] other:      Physical Therapy Evaluation - Knee    Gait:  [] Normal    [] Abnormal    [x] Antalgic    [] NWB    Device: RW    Describe: decreased step length, decreased heel strike, decreased knee flexion     ROM / Strength  [] Unable to assess                  AROM                      PROM                   Strength (1-5)    Left Right Left Right Left Right   Hip Flexion     4 4    Extension          Abduction          Adduction         Knee Flexion 95 80   4 4    Extension 10 20   4 4   Ankle Plantarflexion     4 4    Dorsiflexion     4 4       Flexibility: [] Unable to assess at this time  Hamstrings:    (L) Tightness= [] WNL   [] Min   [] Mod   [x] Severe    (R) Tightness= [] WNL   [] Min   [] Mod   [x] Severe  Quadriceps:    (L) Tightness= [] WNL   [] Min   [] Mod   [] Severe    (R) Tightness= [] WNL   [] Min   [] Mod   [] Severe  Gastroc:      (L) Tightness= [] WNL   [] Min   [] Mod   [] Severe    (R) Tightness= [] WNL   [] Min   [] Mod   [] Severe  Other:    Palpation:   Neg/Pos  Neg/Pos  Neg/Pos   Joint Line x Quad tendon x Patellar ligament    Patella  Fibular head  Pes Anserinus    Tibial tubercle  Hamstring tendons  Infrapatellar fat pad      Optional Tests:    Patellar Mobility   []L []R Hypermobile [x]L [x]R Hypomobile           Other tests/comments:  BP: 105/85  HR: 92 bpm  He was educated on not sleeping with pillow under his knees  10 meter walk test speed: 0.315 m/s       Pain Level (0-10 scale) post treatment:  3-4/10    ASSESSMENT/Changes in Function:      [x]  See Plan of Care  []  See progress note/recertification  []  See Discharge Summary         Progress towards goals / Updated goals:  See POC    PLAN  []  Upgrade activities as tolerated     [x]  Continue plan of care  []  Update interventions per flow sheet       []  Discharge due to:_  []  Other:_      David Harrison, PT 6/19/2020  11:33 AM

## 2020-06-22 ENCOUNTER — HOSPITAL ENCOUNTER (OUTPATIENT)
Dept: PHYSICAL THERAPY | Age: 75
Discharge: HOME | End: 2020-06-22
Payer: MEDICARE

## 2020-06-22 ENCOUNTER — APPOINTMENT (OUTPATIENT)
Dept: PHYSICAL THERAPY | Age: 75
End: 2020-06-22
Payer: MEDICARE

## 2020-06-22 PROCEDURE — 97110 THERAPEUTIC EXERCISES: CPT

## 2020-06-22 PROCEDURE — 97140 MANUAL THERAPY 1/> REGIONS: CPT

## 2020-06-22 NOTE — PROGRESS NOTES
PT DAILY TREATMENT NOTE 10-18    Patient Name: Jessica Ventura. Date:2020  : 1945  [x]  Patient  Verified  Payor: [facility] [employer] / Plan: [place] MEDICARE PART A & B / Product Type: Medicare /    In time:200  Out time:254  Total Treatment Time (min): 50  Visit #: 2 of 8    Medicare/BCBS Only   Total Timed Codes (min):  50 1:1 Treatment Time:  45       Treatment Area: Left knee pain [M25.562]  Pain in right knee [M25.561]    SUBJECTIVE  Pain Level (0-10 scale): 2/10  Any medication changes, allergies to medications, adverse drug reactions, diagnosis change, or new procedure performed?: [x] No    [] Yes (see summary sheet for update)  Subjective functional status/changes:   [] No changes reported  Pt reports feeling better today. He and his wife state the SOB has improved. Spoke with wife after treatment who states patient saw Cardiologist via virtual visit last week and is scheudled to do a stress test . Wife feels SOB has improved and patient is walking more at home.  She states the cardiologist feels the SOB may be related to deconditioning prior to manipulation as wife states pt was pretty immobile      OBJECTIVE    Modality rationale:    Min Type Additional Details    [] Estim:  []Unatt       []IFC  []Premod                        []Other:  []w/ice   []w/heat  Position:  Location:    [] Estim: []Att    []TENS instruct  []NMES                    []Other:  []w/US   []w/ice   []w/heat  Position:  Location:    []  Traction: [] Cervical       []Lumbar                       [] Prone          []Supine                       []Intermittent   []Continuous Lbs:  [] before manual  [] after manual    []  Ultrasound: []Continuous   [] Pulsed                           []1MHz   []3MHz W/cm2:  Location:    []  Iontophoresis with dexamethasone         Location: [] Take home patch   [] In clinic    []  Ice     []  heat  []  Ice massage  []  Laser   []  Anodyne Position:  Location:    []  Laser with stim  [] Other:  Position:  Location:    []  Vasopneumatic Device Pressure:       [] lo [] med [] hi   Temperature: [] lo [] med [] hi   [] Skin assessment post-treatment:  []intact []redness- no adverse reaction    []redness  adverse reaction:     30 min Therapeutic Exercise:  [] See flow sheet :   Rationale: increase ROM and increase strength to improve the patients ability to improve I with community ambulation and transfers. 15 min Manual Therapy:  AP grade II mobs to B knees with overpressure at end knee flexion; patellar mobs in all directions   Rationale: decrease pain, increase ROM and increase tissue extensibility to increase ease with ambulation and transfers            With   [x] TE   [] TA   [] neuro   [] other: Patient Education: [x] Review HEP    [] Progressed/Changed HEP based on:   [x] positioning   [x] body mechanics   [] transfers   [] heat/ice application    [] other:      Other Objective/Functional Measures: 98 02 sat  HR 87 bpm  /86  RR at rest 26bpm and 28bpm after sit to stands      Pt challenged with B heelslides and needs verbal cues for positioning. Good patellar mobility. Restricted most in active knee flexion vs extension       Pain Level (0-10 scale) post treatment: 2/10    ASSESSMENT/Changes in Function: Mr. Brina Manning has limited activity tolerance as well as limited B knee AROM. Pt needs rest breaks and reports he feels fine although he seems easily winded. Most of exercises completed in sitting or supine, but patient able to complete sit to stands with no reports of increased SOB. Will continue to monitor and progress to patient tolerance.      Patient will continue to benefit from skilled PT services to modify and progress therapeutic interventions, address functional mobility deficits, address ROM deficits, address strength deficits, analyze and address soft tissue restrictions, analyze and cue movement patterns, analyze and modify body mechanics/ergonomics, assess and modify postural abnormalities, address imbalance/dizziness and instruct in home and community integration to attain remaining goals. []  See Plan of Care  []  See progress note/recertification  []  See Discharge Summary         Progress towards goals / Updated goals  Short Term Goals: To be accomplished in 1 weeks:  1. Patient will demonstrate compliance with HEP in order to improve B knee AROM for increased ease of ambulation. Pt reports compliance. 6/22/2020     Long Term Goals: To be accomplished in 4 weeks:  1. Patient will improve FOTO score by 10 points in order to demonstrate a significant improvement in function. 2. Patient will improve B knee AROM 3-110 degrees in order to increase ease of ambulation. 3. Patient will improve B knee strength to 5/5 in order to increase ease of stair negotiation. 4. Patient will improve gait speed during 10m walk test to 0.6m/s in order to increase ease of community ambulation.      PLAN  []  Upgrade activities as tolerated     [x]  Continue plan of care  []  Update interventions per flow sheet       []  Discharge due to:_  [x]  Other: Issue HEP with seated knee flexion and GTB NV     Yojana Roberto, PT 6/22/2020  2:03 PM    Future Appointments   Date Time Provider Denisse Swan   6/24/2020  1:15 PM Enedelia Deluca PT [facility]   6/26/2020  8:45 AM Enedelia Deluca PT [facility]   6/29/2020 12:30 PM Enedelia Deluca PT [facility]   6/30/2020 11:45 AM Luane Fail [facility]   7/2/2020 11:45 AM Enedelia Deluca PT [facility]   7/6/2020 10:15 AM Enedelia Deluca PT [facility]   7/8/2020 12:30 PM Enedelia Deluca PT [facility]   7/10/2020 11:00 AM Enedelia Deluca PT [facility]   7/13/2020 11:00 AM Enedelia Deluca PT [facility]

## 2020-06-24 ENCOUNTER — HOSPITAL ENCOUNTER (OUTPATIENT)
Dept: PHYSICAL THERAPY | Age: 75
Discharge: HOME | End: 2020-06-24
Payer: MEDICARE

## 2020-06-24 PROCEDURE — 97110 THERAPEUTIC EXERCISES: CPT

## 2020-06-24 PROCEDURE — 97116 GAIT TRAINING THERAPY: CPT

## 2020-06-24 PROCEDURE — 97140 MANUAL THERAPY 1/> REGIONS: CPT

## 2020-06-24 NOTE — PROGRESS NOTES
PT DAILY TREATMENT NOTE 10-18    Patient Name: Wicho Cullen. Date:2020  : 1945  [x]  Patient  Verified  Payor: Mariel Ramirez / Plan: [place] MEDICARE PART A & B / Product Type: Medicare /    In time: 1:11  Out time: 2:04  Total Treatment Time (min): 48  Visit #: 3 of 8    Medicare/BCBS Only   Total Timed Codes (min):  53 1:1 Treatment Time:  53       Treatment Area: Left knee pain [M25.562]  Pain in right knee [M25.561]    SUBJECTIVE  Pain Level (0-10 scale):  1/10  Any medication changes, allergies to medications, adverse drug reactions, diagnosis change, or new procedure performed?: [x] No    [] Yes (see summary sheet for update)  Subjective functional status/changes:   [] No changes reported  Pt. Reports he is doing pretty good today. He reports he still has a lot of pain in his knees with bending. OBJECTIVE     37 min Therapeutic Exercise:  [x] See flow sheet :   Rationale: increase ROM and increase strength to improve the patients ability to increase ease of ambulation     8 min Manual Therapy:  Patella mobs, trigger point release to distal quads   Rationale: decrease pain, increase ROM and increase tissue extensibility to increase ease of ambulation    8 min Gait Trainin___ feet x4 with  SPC___ device on level surfaces with _CGA__ level of assist and mirror   Rationale: to increase ease of ambulation with SPC          With   [x] TE   [] TA   [] neuro   [] other: Patient Education: [x] Review HEP    [] Progressed/Changed HEP based on:   [] positioning   [] body mechanics   [] transfers   [] heat/ice application    [] other:      Other Objective/Functional Measures:   Right knee AROM: 7-96 degrees  Left knee AROM: 6-96 degrees  Pt. Has pain over right latera epicondyle during bike and has hyperirritability to light palpation   Attempted NMES quad isometrics but pt.  Had significant pain in right lateral knee with this so it was held  Pain with attempting seated knee flexion on right so this was held today  Pt. Had no increase in pain with ambulation with SPC    Pain Level (0-10 scale) post treatment:  3/10    ASSESSMENT/Changes in Function:  Pt. Is progressing slowly towards goals. He is limited by lateral right knee pain and has tenderness to light palpation over lateral knee. He does demonstrate improving B knee mobility. He continues to have decreased B quad activation. He had good stability with short distances with SPC. Patient will continue to benefit from skilled PT services to modify and progress therapeutic interventions, address functional mobility deficits, address ROM deficits, address strength deficits, analyze and address soft tissue restrictions, analyze and cue movement patterns, analyze and modify body mechanics/ergonomics and assess and modify postural abnormalities to attain remaining goals. Progress towards goals / Updated goals:  Short Term Goals: To be accomplished in 1 weeks:  1. Patient will demonstrate compliance with HEP in order to improve B knee AROM for increased ease of ambulation. Pt reports compliance. 6/22/2020     Long Term Goals: To be accomplished in 4 weeks:  1. Patient will improve FOTO score by 10 points in order to demonstrate a significant improvement in function. 2. Patient will improve B knee AROM 3-110 degrees in order to increase ease of ambulation. 3. Patient will improve B knee strength to 5/5 in order to increase ease of stair negotiation.    4. Patient will improve gait speed during 10m walk test to 0.6m/s in order to increase ease of community ambulation.        PLAN  []  Upgrade activities as tolerated     [x]  Continue plan of care  []  Update interventions per flow sheet       []  Discharge due to:_  []  Other:_      Francisco J Ricardo PT 6/24/2020  8:04 AM    Future Appointments   Date Time Provider Denisse Swan   6/24/2020  1:15 PM A[facility]AKA Lynch [facility]   6/26/2020  8:45 AM AYAKA Lynch [facility]   6/29/2020 12:30 PM Tomer , PT [facility]   6/30/2020 11:45 AM Pansy Fernando [facility]   7/2/2020 11:45 AM Tomer , PT [facility]   7/6/2020 10:15 AM Tomer , PT [facility]   7/8/2020 12:30 PM Tomer , PT [facility]   7/10/2020 11:00 AM Tomer , PT [facility]   7/13/2020 11:00 AM Tomer , PT [facility]

## 2020-06-25 ENCOUNTER — APPOINTMENT (OUTPATIENT)
Dept: PHYSICAL THERAPY | Age: 75
End: 2020-06-25
Payer: MEDICARE

## 2020-06-25 NOTE — PROGRESS NOTES
[facility] Juanita Jain  [address] [facility]  [phone] fax    Physical Therapy Discharge Summary    Patient name: Reginald Garcia Start of Care: 20   Referral source: Rick Jimenez, * : 1945   Medical/Treatment Diagnosis: Pain in right knee [M25.561]  Pain in left knee [M25.562]  Payor: [place] MEDICARE / Plan: [place] MEDICARE PART A & B / Product Type: Medicare /  Onset Date:Date of Surgery 20     Prior Hospitalization: see medical history Provider#: [id number]   Medications: Verified on Patient Summary List    Comorbidities: arthritis, HTN, hearing impairment, visual impairment (glasses)    Prior Level of Function: lives with wife in a 1 story home, Ind with ADLs and household tasks, Ind with ambulation                   Visits from [place] of Care: 2    Missed Visits: 1    Reporting Period :20 to 6/10/20    Summary of Care:  Short Term Goals: To be accomplished in 1 weeks:  1.  Pt will be compliant with initial HEP in order to optimize therapy outcomes. Goal met. 6/10/20   Long Term Goals: To be accomplished in 4 weeks:  1.  Pt will improve FOTO by points in order to demonstrate functional improvement. 2.  Pt will report 50% overall improvement since start of care in order to improve QOL. 3.  Pt will improve B knee AROM to 5-90* in order to normalize gait pattern and improve ease of stairs.    4.  Pt will improve B knee strength to 4/5 in order to improve ease of prolonged ambulation and stairs.     Long term goals not assessed d/t unplanned DC     ASSESSMENT/RECOMMENDATIONS:  Pt did not progress in therapy as he only attended 1 follow up after initial evaluation. He had a manipulation and is therefore DC'd from this plan of care. He returned to therapy on 20 for a new evaluation.       [x]Discontinue therapy: []Patient has reached or is progressing toward set goals      []Patient is non-compliant or has abdicated      [x]Due to change in medical status    Tomeka Santos, PT 6/25/2020 8:12 AM

## 2020-06-26 ENCOUNTER — HOSPITAL ENCOUNTER (OUTPATIENT)
Dept: PHYSICAL THERAPY | Age: 75
Discharge: HOME | End: 2020-06-26
Payer: MEDICARE

## 2020-06-26 PROCEDURE — 97110 THERAPEUTIC EXERCISES: CPT

## 2020-06-26 PROCEDURE — 97140 MANUAL THERAPY 1/> REGIONS: CPT

## 2020-06-26 NOTE — PROGRESS NOTES
PT DAILY TREATMENT NOTE 10-18    Patient Name: Antonia Files. Date:2020  : 1945  [x]  Patient  Verified  Payor: [employer] / Plan: [place] MEDICARE PART A & B / Product Type: Medicare /    In time: 8:45  Out time: 9:30  Total Treatment Time (min): 45  Visit #: 4 of 12    Medicare/BCBS Only   Total Timed Codes (min):  45 1:1 Treatment Time:  45       Treatment Area: Left knee pain [M25.562]  Pain in right knee [M25.561]    SUBJECTIVE  Pain Level (0-10 scale):  1/10  Any medication changes, allergies to medications, adverse drug reactions, diagnosis change, or new procedure performed?: [x] No    [] Yes (see summary sheet for update)  Subjective functional status/changes:   [] No changes reported  Pt. Reports he doesn't feel too bad right now but he still occasionally has intense pain in right knee at times. He had that pain earlier when straightening his knee to get out of the car. OBJECTIVE    35 min Therapeutic Exercise:  [x] See flow sheet :   Rationale: increase ROM and increase strength to improve the patients ability to increase ease of ambulation       10 min Manual Therapy:  B patella mobs, right distal quad trigger point release   Rationale: decrease pain, increase ROM and increase tissue extensibility to increase ease of ambulation           With   [x] TE   [] TA   [] neuro   [] other: Patient Education: [x] Review HEP    [] Progressed/Changed HEP based on:   [] positioning   [] body mechanics   [] transfers   [] heat/ice application    [] other:      Other Objective/Functional Measures:   Right knee AROM: 8-96 degrees  Left knee AROM: 6-100 degrees  Pt. Ambulates with decreased heel strike bilaterally    Pain Level (0-10 scale) post treatment: 1/10    ASSESSMENT/Changes in Function: pt. Is progressing slowly towards goals. He continues to have significant decrease in B knee extension mobility. He also continues to be limited by occasional severe pain in right knee.  He does demonstrate improving left knee mobility. Patient will continue to benefit from skilled PT services to modify and progress therapeutic interventions, address functional mobility deficits, address ROM deficits, address strength deficits, analyze and address soft tissue restrictions, analyze and cue movement patterns, analyze and modify body mechanics/ergonomics and assess and modify postural abnormalities to attain remaining goals. Progress towards goals / Updated goals:  Short Term Goals: To be accomplished in 1 weeks:  1. Patient will demonstrate compliance with HEP in order to improve B knee AROM for increased ease of ambulation. Pt reports compliance. 6/22/2020     Long Term Goals: To be accomplished in 4 weeks:  1. Patient will improve FOTO score by 10 points in order to demonstrate a significant improvement in function. 2. Patient will improve B knee AROM 3-110 degrees in order to increase ease of ambulation. 3. Patient will improve B knee strength to 5/5 in order to increase ease of stair negotiation.    4. Patient will improve gait speed during 10m walk test to 0.6m/s in order to increase ease of community ambulation.     PLAN  []  Upgrade activities as tolerated     []  Continue plan of care  []  Update interventions per flow sheet       []  Discharge due to:_  []  Other:_      Luis Alfredo Hector, PT 6/26/2020  7:39 AM    Future Appointments   Date Time Provider Denisse Swan   6/26/2020  8:45 AM Jessie Bound, PT [facility]   6/29/2020 12:30 PM Jessie Bound, PT [facility]   6/30/2020 11:45 AM Diane Lightning [facility]   7/2/2020 11:45 AM Jessie Bound, PT [facility]   7/6/2020 10:15 AM Jessie Bound, PT [facility]   7/8/2020 12:30 PM [facility]ie Bound, [facility]   7/10/2020 11:00 AM Jessie Bound, PT [facility]   7/13/2020 11:00 AM Jessie Bound, PT [facility]

## 2020-06-29 ENCOUNTER — APPOINTMENT (OUTPATIENT)
Dept: PHYSICAL THERAPY | Age: 75
End: 2020-06-29
Payer: MEDICARE

## 2020-06-29 ENCOUNTER — HOSPITAL ENCOUNTER (OUTPATIENT)
Dept: PHYSICAL THERAPY | Age: 75
End: 2020-06-29
Payer: MEDICARE

## 2020-06-29 NOTE — PROGRESS NOTES
[facility] Loi Mccloud  [address] [facility]  [phone] fax    Physical Therapy Discharge Summary  Patient name: Maritza Goldmann Start of Care: 2020   Referral source: Lolita Travis, * : 1945               Medical Diagnosis: Left knee pain [M25.562]  Pain in right knee [M25.561]  Payor: [place] MEDICARE / Plan: VA MEDICARE PART A & B / Product Type: Medicare /  Onset Date: B TKA on 20. Manipulation 20               Treatment Diagnosis: B knee pain   Prior Hospitalization: see medical history Provider#: [id number]   Medications: Verified on Patient summary List     Comorbidities: arthritis, HTN, hearing impairment, visual impairment (glasses)    Prior Level of Function: lives with wife in a 1 story home, Ind with ADLs and household tasks, Ind with ambulation        Visits from [place] of Care: 4    Missed Visits: 0    Reporting Period : 20 to  20    Summary of Care:  Goal: Patient will improve FOTO score by 10 points in order to demonstrate a significant improvement in function. Status at last note/certification: 25  Status at discharge: not met    Goal: Patient will improve B knee AROM 3-110 degrees in order to increase ease of ambulation. Status at last note/certification:   right: 20-80 degrees left: 10-95 degrees  Status at discharge: not met    Goal:  Patient will improve B knee strength to 5/5 in order to increase ease of stair negotiation. Status at last note/certification: 4/5  Status at discharge: not met    Goal: Patient will improve gait speed during 10m walk test to 0.6m/s in order to increase ease of community ambulation.   Status at last note/certification: 9.588 m/s  Status at discharge: not met    Pt.was seen for 4 visits and will be D/C at this time secondary to being admitted to the hospital. Right knee AROM improved to 8-96 degrees and left knee AROM improved to 6-100 degrees.  Other goals were unable to be re-assessed secondary to unplanned D/C.        ASSESSMENT/RECOMMENDATIONS:  [x]Discontinue therapy: []Patient has reached or is progressing toward set goals      []Patient is non-compliant or has abdicated      []Due to lack of appreciable progress towards set goals      X: admitted to [facility], PT 6/29/2020 1:16 PM

## 2020-06-30 ENCOUNTER — APPOINTMENT (OUTPATIENT)
Dept: PHYSICAL THERAPY | Age: 75
End: 2020-06-30
Payer: MEDICARE

## 2020-07-02 ENCOUNTER — APPOINTMENT (OUTPATIENT)
Dept: PHYSICAL THERAPY | Age: 75
End: 2020-07-02

## 2020-07-06 ENCOUNTER — APPOINTMENT (OUTPATIENT)
Dept: PHYSICAL THERAPY | Age: 75
End: 2020-07-06

## 2020-07-08 ENCOUNTER — APPOINTMENT (OUTPATIENT)
Dept: PHYSICAL THERAPY | Age: 75
End: 2020-07-08

## 2020-07-10 ENCOUNTER — APPOINTMENT (OUTPATIENT)
Dept: PHYSICAL THERAPY | Age: 75
End: 2020-07-10

## 2020-07-13 ENCOUNTER — APPOINTMENT (OUTPATIENT)
Dept: PHYSICAL THERAPY | Age: 75
End: 2020-07-13

## 2024-05-22 ENCOUNTER — HOSPITAL ENCOUNTER (OUTPATIENT)
Facility: HOSPITAL | Age: 79
Discharge: HOME | End: 2024-05-25
Payer: MEDICARE

## 2024-05-22 DIAGNOSIS — M79.641 RIGHT HAND PAIN: ICD-10-CM

## 2024-05-22 PROCEDURE — 73130 X-RAY EXAM OF HAND: CPT

## 2025-01-29 NOTE — PROGRESS NOTES
[facility] - [facility] [employer]  [address] [place] [address]  [phone] fax    Physical Therapy Discharge Summary    Patient name: Ac Mcleod Start of Care:  10/17/18   Referral source: Iona Bethany, * : 1945   Medical/Treatment Diagnosis: Pain in right shoulder [M25.511]  Payor: [employer] / Plan: [place] MEDICARE PART A & B / Product Type: Medicare /  Onset Date: 18     Prior Hospitalization: see medical history Provider#: [id number]   Medications: Verified on Patient Summary List    Comorbidities:  HTN, hearing impaired  Prior Level of Function: Ind with ADLs, right hand dominant, working, golf    Visits from Start of Care: 33    Missed Visits: 1    Reporting Period : 19 to 3/1/19    Summary of Care:  Goal: . Patient will improve right shoulder AROM flex to 120 degrees in order to increase ease of reaching into cabinets at home. Status at last note/certification: 186 degrees  Status at discharge: met    Goal: Patient will improve behind back reaching to L4 in order to increase ease of putting on his belt. Status at last note/certification: right buttocks  Status at discharge: not met    Pt. progressed slowly towards goals and is now at a plateau in progress. Right shoulder AROM flexion: 120 degrees abd: 90 degrees. Right behind back reaching continues to be limited right buttocks. He has good pain control except for moving shoulder into end ranges or putting on his pants. He was educated on an updated HEP in order to continue to improve shoulder mobility and strength following D/C.      ASSESSMENT/RECOMMENDATIONS:  [x]Discontinue therapy: []Patient has reached or is progressing toward set goals      []Patient is non-compliant or has abdicated      [x]Due to lack of appreciable progress towards set goals    Lisa Jenkins, PT 3/1/2019 11:42 AM No